# Patient Record
Sex: MALE | Race: WHITE | Employment: FULL TIME | URBAN - NONMETROPOLITAN AREA
[De-identification: names, ages, dates, MRNs, and addresses within clinical notes are randomized per-mention and may not be internally consistent; named-entity substitution may affect disease eponyms.]

---

## 2017-11-29 ENCOUNTER — HOSPITAL ENCOUNTER (EMERGENCY)
Age: 22
Discharge: HOME | End: 2017-11-29
Attending: EMERGENCY MEDICINE
Payer: COMMERCIAL

## 2017-11-29 ENCOUNTER — APPOINTMENT (OUTPATIENT)
Dept: GENERAL RADIOLOGY | Age: 22
End: 2017-11-29
Payer: COMMERCIAL

## 2017-11-29 ENCOUNTER — APPOINTMENT (OUTPATIENT)
Dept: CT IMAGING | Age: 22
End: 2017-11-29
Payer: COMMERCIAL

## 2017-11-29 VITALS
SYSTOLIC BLOOD PRESSURE: 139 MMHG | RESPIRATION RATE: 16 BRPM | BODY MASS INDEX: 26.51 KG/M2 | HEART RATE: 82 BPM | DIASTOLIC BLOOD PRESSURE: 70 MMHG | TEMPERATURE: 100.3 F | HEIGHT: 73 IN | WEIGHT: 200 LBS | OXYGEN SATURATION: 98 %

## 2017-11-29 DIAGNOSIS — K20.90 ACUTE ESOPHAGITIS: Primary | ICD-10-CM

## 2017-11-29 DIAGNOSIS — R74.8 ELEVATED LIPASE: ICD-10-CM

## 2017-11-29 LAB
ALBUMIN SERPL-MCNC: 3.6 GM/DL (ref 3.5–5)
ALP BLD-CCNC: 41 U/L (ref 46–116)
ALT SERPL-CCNC: 33 U/L (ref 12–78)
ANION GAP: 7 MEQ/L (ref 8–16)
AST SERPL-CCNC: 28 U/L (ref 15–37)
BASOPHILS # BLD: 0.9 % (ref 0–3)
BILIRUB SERPL-MCNC: 0.4 MG/DL (ref 0.2–1)
BUN BLDV-MCNC: 12 MG/DL (ref 7–18)
CHLORIDE BLD-SCNC: 100 MEQ/L (ref 98–107)
CO2: 28 MEQ/L (ref 21–32)
CREAT SERPL-MCNC: 1.1 MG/DL (ref 0.8–1.3)
EOSINOPHILS RELATIVE PERCENT: 3.7 % (ref 0–4)
GFR, ESTIMATED: 87 ML/MIN/1.73M2
GLUCOSE BLD-MCNC: 96 MG/DL (ref 74–106)
HCT VFR BLD CALC: 43.1 % (ref 42–52)
HEMOGLOBIN: 14.3 GM/DL (ref 14–18)
LIPASE: 485 U/L (ref 65–230)
LYMPHOCYTES # BLD: 20.3 % (ref 15–47)
MCH RBC QN AUTO: 29.4 PG (ref 27–31)
MCHC RBC AUTO-ENTMCNC: 33.2 GM/DL (ref 33–37)
MCV RBC AUTO: 88.7 FL (ref 80–94)
MONOCYTES: 12.6 % (ref 0–12)
PDW BLD-RTO: 11.9 % (ref 11.5–14.5)
PLATELET # BLD: 196 THOU/MM3 (ref 130–400)
PMV BLD AUTO: 7.3 MCM (ref 7.4–10.4)
POC CALCIUM: 8.9 MG/DL (ref 8.5–10.1)
POTASSIUM SERPL-SCNC: 4 MEQ/L (ref 3.5–5.1)
RBC # BLD: 4.86 MILL/MM3 (ref 4.7–6.1)
SEGS: 62.5 % (ref 43–75)
SODIUM BLD-SCNC: 135 MEQ/L (ref 136–145)
TOTAL PROTEIN: 7.6 GM/DL (ref 6.4–8.2)
WBC # BLD: 11.5 THOU/MM3 (ref 4.8–10.8)

## 2017-11-29 PROCEDURE — 80053 COMPREHEN METABOLIC PANEL: CPT

## 2017-11-29 PROCEDURE — 71020 XR CHEST STANDARD TWO VW: CPT

## 2017-11-29 PROCEDURE — 99284 EMERGENCY DEPT VISIT MOD MDM: CPT

## 2017-11-29 PROCEDURE — 6360000004 HC RX CONTRAST MEDICATION: Performed by: EMERGENCY MEDICINE

## 2017-11-29 PROCEDURE — 83690 ASSAY OF LIPASE: CPT

## 2017-11-29 PROCEDURE — C9113 INJ PANTOPRAZOLE SODIUM, VIA: HCPCS | Performed by: EMERGENCY MEDICINE

## 2017-11-29 PROCEDURE — 6360000002 HC RX W HCPCS: Performed by: EMERGENCY MEDICINE

## 2017-11-29 PROCEDURE — 74177 CT ABD & PELVIS W/CONTRAST: CPT

## 2017-11-29 PROCEDURE — 96374 THER/PROPH/DIAG INJ IV PUSH: CPT

## 2017-11-29 PROCEDURE — 2580000003 HC RX 258: Performed by: EMERGENCY MEDICINE

## 2017-11-29 PROCEDURE — 85025 COMPLETE CBC W/AUTO DIFF WBC: CPT

## 2017-11-29 PROCEDURE — 36415 COLL VENOUS BLD VENIPUNCTURE: CPT

## 2017-11-29 PROCEDURE — 96361 HYDRATE IV INFUSION ADD-ON: CPT

## 2017-11-29 PROCEDURE — 6370000000 HC RX 637 (ALT 250 FOR IP): Performed by: EMERGENCY MEDICINE

## 2017-11-29 RX ORDER — 0.9 % SODIUM CHLORIDE 0.9 %
500 INTRAVENOUS SOLUTION INTRAVENOUS ONCE
Status: COMPLETED | OUTPATIENT
Start: 2017-11-29 | End: 2017-11-29

## 2017-11-29 RX ORDER — OMEPRAZOLE 10 MG/1
10 CAPSULE, DELAYED RELEASE ORAL DAILY
COMMUNITY
End: 2017-11-29 | Stop reason: ALTCHOICE

## 2017-11-29 RX ORDER — ACETAMINOPHEN 325 MG/1
975 TABLET ORAL ONCE
Status: COMPLETED | OUTPATIENT
Start: 2017-11-29 | End: 2017-11-29

## 2017-11-29 RX ORDER — PANTOPRAZOLE SODIUM 40 MG/10ML
40 INJECTION, POWDER, LYOPHILIZED, FOR SOLUTION INTRAVENOUS DAILY
Status: DISCONTINUED | OUTPATIENT
Start: 2017-11-29 | End: 2017-11-29 | Stop reason: HOSPADM

## 2017-11-29 RX ORDER — SUCRALFATE ORAL 1 G/10ML
1 SUSPENSION ORAL 4 TIMES DAILY
Qty: 1200 ML | Refills: 3 | Status: SHIPPED | OUTPATIENT
Start: 2017-11-29 | End: 2018-03-25

## 2017-11-29 RX ORDER — PANTOPRAZOLE SODIUM 40 MG/1
40 TABLET, DELAYED RELEASE ORAL DAILY
Qty: 30 TABLET | Refills: 0 | Status: SHIPPED | OUTPATIENT
Start: 2017-11-29 | End: 2018-03-25

## 2017-11-29 RX ORDER — BACLOFEN 10 MG/1
10 TABLET ORAL 3 TIMES DAILY
COMMUNITY
End: 2018-03-25

## 2017-11-29 RX ADMIN — Medication 30 ML: at 16:34

## 2017-11-29 RX ADMIN — PANTOPRAZOLE SODIUM 40 MG: 40 INJECTION, POWDER, FOR SOLUTION INTRAVENOUS at 20:16

## 2017-11-29 RX ADMIN — ACETAMINOPHEN 975 MG: 325 TABLET ORAL at 20:15

## 2017-11-29 RX ADMIN — SODIUM CHLORIDE 500 ML: 9 INJECTION, SOLUTION INTRAVENOUS at 18:16

## 2017-11-29 RX ADMIN — IOHEXOL 50 ML: 240 INJECTION, SOLUTION INTRATHECAL; INTRAVASCULAR; INTRAVENOUS; ORAL at 19:21

## 2017-11-29 RX ADMIN — IOPAMIDOL 100 ML: 755 INJECTION, SOLUTION INTRAVENOUS at 19:21

## 2017-11-29 ASSESSMENT — ENCOUNTER SYMPTOMS
SHORTNESS OF BREATH: 0
SORE THROAT: 1
WHEEZING: 0
VOICE CHANGE: 0
VOMITING: 0
BLOOD IN STOOL: 0
CONSTIPATION: 0
ABDOMINAL DISTENTION: 0
TROUBLE SWALLOWING: 1
COUGH: 0
ANAL BLEEDING: 0
BACK PAIN: 0
NAUSEA: 0
ABDOMINAL PAIN: 1
DIARRHEA: 0

## 2017-11-29 ASSESSMENT — PAIN DESCRIPTION - PAIN TYPE
TYPE: ACUTE PAIN
TYPE: ACUTE PAIN

## 2017-11-29 ASSESSMENT — PAIN DESCRIPTION - DESCRIPTORS
DESCRIPTORS: ACHING
DESCRIPTORS: ACHING

## 2017-11-29 ASSESSMENT — PAIN DESCRIPTION - LOCATION: LOCATION: THROAT

## 2017-11-29 ASSESSMENT — PAIN SCALES - GENERAL
PAINLEVEL_OUTOF10: 3
PAINLEVEL_OUTOF10: 3

## 2017-11-29 ASSESSMENT — PAIN DESCRIPTION - FREQUENCY
FREQUENCY: CONTINUOUS
FREQUENCY: CONTINUOUS

## 2017-11-29 NOTE — ED PROVIDER NOTES
[facility]  eMERGENCY dEPARTMENT eNCOUnter             José Antonio Wheatley 17 COMPLAINT    Chief Complaint   Patient presents with    Dysphagia     from ckoking on Sunday       Nurses Notes reviewed and I agree except as noted in the HPI. HPI    José Espinoza is a 23 y.o. male who presents stating that 4 days ago, some \"dry turkey meat\" got stuck in his esophagus, and he had to \"force it down with water\". This took several minutes, and ever since then he has had a sore spot in the lower xyphoid area, just above the epigastrium. The pain is always there as an aching, 3-4/10, but if he eats or drinks anything acidic or solid, he has worse pain. Swallowing food is the main thing that makes it worse. He has tried Ibuprofen 800 mg, Baclofen, and took a dose of Omeprazole today. He is concerned that he might have a tear in his esophagus. He has had some problem with choking and \"things getting stuck\" in the past, but this is the worst episode. He has never been scoped or seen a doctor for this kind of problem. He feels like \"his sugar is low and he might be dehydrated\" due to this. He has not vomited, and has not had any dark stools. He is drinking plenty, but avoiding solids. He tried some Hydrocodone from \"some else\", without relief of pain. REVIEW OF SYSTEMS      Review of Systems   Constitutional: Positive for fatigue. HENT: Positive for sore throat (left more than right) and trouble swallowing. Negative for congestion and voice change. Respiratory: Negative for cough, shortness of breath and wheezing. Cardiovascular: Negative for palpitations. Gastrointestinal: Positive for abdominal pain (epigastric). Negative for abdominal distention, anal bleeding, blood in stool, constipation, diarrhea, nausea and vomiting. Musculoskeletal: Negative for back pain and neck pain. Neurological: Negative.     Psychiatric/Behavioral: The patient is nervous/anxious (about

## 2017-11-30 NOTE — ED NOTES
Pt stable and ready for dc. Pt is given discharge instructions and new prescriptions. Pt verbalizes understanding and ambulates independently.       Lisset Mark RN  11/29/17 1700

## 2017-11-30 NOTE — ED NOTES
Pt back from CT and using restroom.  States he feels dehydrated and like he has a fever     Machelle Jesus RN  11/29/17 1929

## 2017-11-30 NOTE — ED NOTES
Pt is in [facility], [address] [place] [address]  11/29/17 9402

## 2018-03-25 ENCOUNTER — HOSPITAL ENCOUNTER (EMERGENCY)
Age: 23
Discharge: HOME | End: 2018-03-25
Attending: FAMILY MEDICINE
Payer: COMMERCIAL

## 2018-03-25 VITALS
HEART RATE: 78 BPM | SYSTOLIC BLOOD PRESSURE: 143 MMHG | DIASTOLIC BLOOD PRESSURE: 94 MMHG | OXYGEN SATURATION: 98 % | TEMPERATURE: 96.1 F | RESPIRATION RATE: 14 BRPM

## 2018-03-25 DIAGNOSIS — J01.00 ACUTE MAXILLARY SINUSITIS, RECURRENCE NOT SPECIFIED: Primary | ICD-10-CM

## 2018-03-25 PROCEDURE — 99282 EMERGENCY DEPT VISIT SF MDM: CPT

## 2018-03-25 RX ORDER — FLUTICASONE PROPIONATE 50 MCG
2 SPRAY, SUSPENSION (ML) NASAL DAILY
Qty: 1 BOTTLE | Refills: 0 | Status: SHIPPED | OUTPATIENT
Start: 2018-03-25 | End: 2020-04-16

## 2018-03-25 RX ORDER — AMOXICILLIN AND CLAVULANATE POTASSIUM 875; 125 MG/1; MG/1
1 TABLET, FILM COATED ORAL 2 TIMES DAILY
Qty: 20 TABLET | Refills: 0 | Status: SHIPPED | OUTPATIENT
Start: 2018-03-25 | End: 2018-04-04

## 2018-03-25 ASSESSMENT — PAIN DESCRIPTION - LOCATION: LOCATION: THROAT

## 2018-03-25 ASSESSMENT — PAIN SCALES - GENERAL: PAINLEVEL_OUTOF10: 5

## 2018-03-25 ASSESSMENT — PAIN DESCRIPTION - PAIN TYPE: TYPE: ACUTE PAIN

## 2018-03-25 NOTE — ED PROVIDER NOTES
that includes Clavicle surgery; Tonsillectomy and Adenoidectomy; and Hand surgery (Right). CURRENT MEDICATIONS       Previous Medications    ALBUTEROL-IPRATROPIUM (COMBIVENT)  MCG/ACT INHALER    Inhale 2 puffs into the lungs every 6 hours as needed for Wheezing    AMPHETAMINE-DEXTROAMPHETAMINE (ADDERALL, 10MG,) 10 MG TABLET    Take 10 mg by mouth 2 times daily       ALLERGIES     is allergic to erythromycin. FAMILY HISTORY     has no family status information on file. family history is not on file. SOCIAL HISTORY      reports that he has never smoked. He has never used smokeless tobacco. He reports that he drinks alcohol. He reports that he does not use drugs. PHYSICAL EXAM     INITIAL VITALS:  temperature is 96.1 °F (35.6 °C). His blood pressure is 143/94 (abnormal) and his pulse is 78. His respiration is 14 and oxygen saturation is 98%. Physical Exam   Constitutional: He appears well-developed and well-nourished. No distress. HENT:   Right Ear: External ear normal.   Left Ear: External ear normal.   Nose: Nose normal.   Mouth/Throat: No oropharyngeal exudate. Patient has palpable maxillary tenderness. Eyes: Conjunctivae and EOM are normal. Pupils are equal, round, and reactive to light. Right eye exhibits no discharge. Left eye exhibits no discharge. Neck: Normal range of motion. Neck supple. Cardiovascular: Normal rate, regular rhythm, normal heart sounds and intact distal pulses. No murmur heard. Pulmonary/Chest: Effort normal and breath sounds normal.   Lymphadenopathy:     He has no cervical adenopathy. Skin: Skin is dry. No rash noted. Psychiatric: He has a normal mood and affect. Nursing note and vitals reviewed.       DIFFERENTIAL DIAGNOSIS:   Sinusitis, pharyngitis, URI, allergic rhinitis     DIAGNOSTIC RESULTS     EKG: All EKG's are interpreted by the Emergency Department Physician who either signs or Co-signs this chart in the absence of a cardiologist.      RADIOLOGY: non-plain film images(s) such as CT, Ultrasound and MRI are read by the radiologist.  Plain radiographic images are visualized and preliminarily interpreted by the emergency physician unless otherwise stated below. LABS:   Labs Reviewed - No data to display    EMERGENCY DEPARTMENT COURSE(MDM): Vitals:    Vitals:    03/25/18 1538   BP: (!) 143/94   Pulse: 78   Resp: 14   Temp: 96.1 °F (35.6 °C)   SpO2: 98%     Patient's exam findings were minimal.  He does have some mild tenderness to the maxillary sinuses on palpation. He does not note any upper dental tenderness. The rest of the HEENT exam unremarkable. Posterior pharynx appeared to be clear. There is no exudate, no anterior cervical adenopathy, no soft palate erythema, and no fever. The patient does have a recurrent history of allergic rhinitis. At this time I will recommend Flonase, we will start the patient on antibiotics secondary to duration of his symptoms and his past experiences with sinus infections. I recommended further follow-up with his PCP. Care instructions were discussed with the patient who voiced understanding    CRITICAL CARE:       CONSULTS:  None    PROCEDURES:  None    FINAL IMPRESSION      1. Acute maxillary sinusitis, recurrence not specified          DISPOSITION/PLAN   Home. Care instructions provided. Follow up with PCP if symptoms are not improving over the next week.     PATIENT REFERRED TO:  Andry Martinez [address]  [phone]    In 3 days  If symptoms worsen, As needed      DISCHARGE MEDICATIONS:  New Prescriptions    AMOXICILLIN-CLAVULANATE (AUGMENTIN) 875-125 MG PER TABLET    Take 1 tablet by mouth 2 times daily for 10 days    FLUTICASONE (FLONASE) 50 MCG/ACT NASAL SPRAY    2 sprays by Nasal route daily       (Please note that portions of this note were completed with a voice recognition program.  Efforts were made to edit the dictations but occasionally words

## 2018-03-26 ASSESSMENT — ENCOUNTER SYMPTOMS
BACK PAIN: 0
COUGH: 0
WHEEZING: 0
SINUS PRESSURE: 1
EYE DISCHARGE: 0
CHEST TIGHTNESS: 0
VOMITING: 0
NAUSEA: 0
CONSTIPATION: 0
ABDOMINAL DISTENTION: 0
EYE REDNESS: 0
SHORTNESS OF BREATH: 0
PHOTOPHOBIA: 0
STRIDOR: 0
SORE THROAT: 1
ABDOMINAL PAIN: 0
DIARRHEA: 0
EYE PAIN: 0
RHINORRHEA: 0

## 2018-05-28 ENCOUNTER — HOSPITAL ENCOUNTER (EMERGENCY)
Age: 23
Discharge: HOME | End: 2018-05-28
Attending: EMERGENCY MEDICINE
Payer: COMMERCIAL

## 2018-05-28 VITALS
DIASTOLIC BLOOD PRESSURE: 61 MMHG | RESPIRATION RATE: 16 BRPM | HEART RATE: 88 BPM | SYSTOLIC BLOOD PRESSURE: 140 MMHG | TEMPERATURE: 97.4 F | OXYGEN SATURATION: 96 %

## 2018-05-28 DIAGNOSIS — L92.3 TATTOO REACTION: Primary | ICD-10-CM

## 2018-05-28 PROCEDURE — 6360000002 HC RX W HCPCS: Performed by: EMERGENCY MEDICINE

## 2018-05-28 PROCEDURE — 96372 THER/PROPH/DIAG INJ SC/IM: CPT

## 2018-05-28 PROCEDURE — 99283 EMERGENCY DEPT VISIT LOW MDM: CPT

## 2018-05-28 RX ORDER — METHYLPREDNISOLONE ACETATE 80 MG/ML
80 INJECTION, SUSPENSION INTRA-ARTICULAR; INTRALESIONAL; INTRAMUSCULAR; SOFT TISSUE ONCE
Status: COMPLETED | OUTPATIENT
Start: 2018-05-28 | End: 2018-05-28

## 2018-05-28 RX ORDER — ONDANSETRON 4 MG/1
4 TABLET, ORALLY DISINTEGRATING ORAL EVERY 8 HOURS PRN
Qty: 6 TABLET | Refills: 0 | Status: SHIPPED | OUTPATIENT
Start: 2018-05-28 | End: 2020-04-16

## 2018-05-28 RX ADMIN — METHYLPREDNISOLONE ACETATE 80 MG: 80 INJECTION, SUSPENSION INTRA-ARTICULAR; INTRALESIONAL; INTRAMUSCULAR; SOFT TISSUE at 19:31

## 2018-05-29 ASSESSMENT — ENCOUNTER SYMPTOMS
VOMITING: 0
RESPIRATORY NEGATIVE: 1
ABDOMINAL PAIN: 0
DIARRHEA: 1

## 2020-04-16 ENCOUNTER — OFFICE VISIT (OUTPATIENT)
Dept: PRIMARY CARE CLINIC | Age: 25
End: 2020-04-16
Payer: COMMERCIAL

## 2020-04-16 VITALS
TEMPERATURE: 98.5 F | HEART RATE: 96 BPM | DIASTOLIC BLOOD PRESSURE: 73 MMHG | SYSTOLIC BLOOD PRESSURE: 147 MMHG | OXYGEN SATURATION: 100 % | RESPIRATION RATE: 20 BRPM

## 2020-04-16 LAB
INFLUENZA A ANTIBODY: NEGATIVE
INFLUENZA B ANTIBODY: NEGATIVE

## 2020-04-16 PROCEDURE — 99203 OFFICE O/P NEW LOW 30 MIN: CPT | Performed by: FAMILY MEDICINE

## 2020-04-16 PROCEDURE — 87804 INFLUENZA ASSAY W/OPTIC: CPT | Performed by: FAMILY MEDICINE

## 2020-04-16 RX ORDER — PREDNISONE 20 MG/1
20 TABLET ORAL 2 TIMES DAILY
Qty: 10 TABLET | Refills: 0 | Status: SHIPPED | OUTPATIENT
Start: 2020-04-16 | End: 2020-04-21

## 2020-04-16 RX ORDER — LORATADINE 10 MG/1
10 CAPSULE, LIQUID FILLED ORAL DAILY
COMMUNITY
End: 2021-07-28

## 2020-04-16 RX ORDER — ALBUTEROL SULFATE 90 UG/1
2 AEROSOL, METERED RESPIRATORY (INHALATION) EVERY 6 HOURS PRN
COMMUNITY
End: 2022-01-18 | Stop reason: SDUPTHER

## 2020-04-16 RX ORDER — BENZONATATE 100 MG/1
100 CAPSULE ORAL 3 TIMES DAILY PRN
Qty: 30 CAPSULE | Refills: 0 | Status: SHIPPED | OUTPATIENT
Start: 2020-04-16 | End: 2020-04-26

## 2020-04-16 RX ORDER — DEXTROAMPHETAMINE SACCHARATE, AMPHETAMINE ASPARTATE MONOHYDRATE, DEXTROAMPHETAMINE SULFATE AND AMPHETAMINE SULFATE 7.5; 7.5; 7.5; 7.5 MG/1; MG/1; MG/1; MG/1
30 CAPSULE, EXTENDED RELEASE ORAL EVERY MORNING
COMMUNITY
End: 2021-08-18 | Stop reason: SDUPTHER

## 2020-04-16 RX ORDER — DOXYCYCLINE HYCLATE 100 MG
100 TABLET ORAL 2 TIMES DAILY
Qty: 20 TABLET | Refills: 0 | Status: SHIPPED | OUTPATIENT
Start: 2020-04-16 | End: 2021-07-28

## 2020-04-16 ASSESSMENT — ENCOUNTER SYMPTOMS
NAUSEA: 0
COUGH: 1
SHORTNESS OF BREATH: 1
VOMITING: 0
WHEEZING: 1
RHINORRHEA: 1
SORE THROAT: 1

## 2020-04-16 NOTE — PATIENT INSTRUCTIONS
petting, snuggling, being kissed or licked, and sharing food. If you must care for your pet or be around animals while you are sick, wash your hands before and after you interact with pets and wear a facemask. Call ahead before visiting your doctor  If you have a medical appointment, call the healthcare provider and tell them that you have or may have COVID-19. This will help the healthcare providers office take steps to keep other people from getting infected or exposed. Wear a facemask  You should wear a facemask when you are around other people (e.g., sharing a room or vehicle) or pets and before you enter a healthcare providers office. If you are not able to wear a facemask (for example, because it causes trouble breathing), then people who live with you should not stay in the same room with you, or they should wear a facemask if they enter your room. Cover your coughs and sneezes  Cover your mouth and nose with a tissue when you cough or sneeze. Throw used tissues in a lined trash can. Immediately wash your hands with soap and water for at least 20 seconds or, if soap and water are not available, clean your hands with an alcohol-based hand  that contains at least 60% alcohol. Clean your hands often  Wash your hands often with soap and water for at least 20 seconds, especially after blowing your nose, coughing, or sneezing; going to the bathroom; and before eating or preparing food. If soap and water are not readily available, use an alcohol-based hand  with at least 60% alcohol, covering all surfaces of your hands and rubbing them together until they feel dry. Soap and water are the best option if hands are visibly dirty. Avoid touching your eyes, nose, and mouth with unwashed hands. Avoid sharing personal household items  You should not share dishes, drinking glasses, cups, eating utensils, towels, or bedding with other people or pets in your home.  After using these items, they should be washed thoroughly with soap and water. Clean all high-touch surfaces everyday  High touch surfaces include counters, tabletops, doorknobs, bathroom fixtures, toilets, phones, keyboards, tablets, and bedside tables. Also, clean any surfaces that may have blood, stool, or body fluids on them. Use a household cleaning spray or wipe, according to the label instructions. Labels contain instructions for safe and effective use of the cleaning product including precautions you should take when applying the product, such as wearing gloves and making sure you have good ventilation during use of the product. Monitor your symptoms  Seek prompt medical attention if your illness is worsening (e.g., difficulty breathing). Before seeking care, call your healthcare provider and tell them that you have, or are being evaluated for, COVID-19. Put on a facemask before you enter the facility. These steps will help the healthcare providers office to keep other people in the office or waiting room from getting infected or exposed. Ask your healthcare provider to call the local or [facility] health department. Persons who are placed under active monitoring or facilitated self-monitoring should follow instructions provided by their local health department or occupational health professionals, as appropriate. When working with your local health department check their available hours. If you have a medical emergency and need to call 911, notify the dispatch personnel that you have, or are being evaluated for COVID-19. If possible, put on a facemask before emergency medical services arrive. Discontinuing home isolation  Patients with confirmed COVID-19 should remain under home isolation precautions until the risk of secondary transmission to others is thought to be low.  The decision to discontinue home isolation precautions should be made on a case-by-case basis, in consultation with healthcare providers and state and [facility] health

## 2020-04-16 NOTE — PROGRESS NOTES
[address] [facility] [address]  [address] [facility]  Dept: [phone]  Dept Fax: [phone]  Loc: [phone]  PROGRESS NOTE      Visit Date: 4/16/2020    Celeste Lemon is a 30 y.o. male who presents today for:  Chief Complaint   Patient presents with    Cough     He has SOB, fatigue, body aches, headache, dry cough, sense of taste is off. He has asthma.  Shortness of Breath    Headache       Subjective:  HPI   New Patient  Cough:  started 3-4 days ago. Has occasional SOB and wheezing. Taste sensation is off. Symptoms have been stable. Using albuterol 2x per day. Has asthma. Eating less. Drinking fluids well. Taking excedrin for headaches. he is more tired. 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 Has people out at work due to illness. His girlfriend was quarantined due to possible COVID (not tested). No recent travel    Recent Travel Screening and Travel History documentation:     Travel Screening       Question Response     Do you have any of the following symptoms? Cough; Severe headache;Shortness of breath;Joint pain     In the last month, have you been in contact with someone who was confirmed or suspected to have Coronavirus / COVID-19? Yes (His girlfriend was susected positive/quarantined for 14 days. Works at [employer].)     Have you traveled internationally in the last month? No      Travel History   Travel since 03/16/20     No documented travel since 03/16/20             Review of Systems   Constitutional: Positive for chills, fatigue and fever. HENT: Positive for rhinorrhea and sore throat. Negative for ear pain. Respiratory: Positive for cough, shortness of breath and wheezing. Gastrointestinal: Negative for nausea and vomiting. Neurological: Positive for headaches. There is no problem list on file for this patient.     Past Medical History:   Diagnosis Date    ADHD (attention deficit hyperactivity disorder)     Asthma atraumatic. Right Ear: Tympanic membrane, ear canal and external ear normal.      Left Ear: Tympanic membrane, ear canal and external ear normal.      Mouth/Throat:      Mouth: Mucous membranes are moist.      Pharynx: Posterior oropharyngeal erythema present. No oropharyngeal exudate. Cardiovascular:      Rate and Rhythm: Normal rate and regular rhythm. Heart sounds: No murmur. Pulmonary:      Effort: Pulmonary effort is normal. No respiratory distress. Breath sounds: Normal breath sounds. No wheezing or rhonchi. Neurological:      Mental Status: He is alert and oriented to person, place, and time. Mental status is at baseline. Impression/Plan:  1. Acute bronchitis due to other specified organisms  New problem. Increased risk for complications due to asthma. Lower respiratory symptoms with loss of taste which makes COVID likely. Influenza testing is negative. Unlikely PNA. May have mild asthma exacerbation. Pulse ox is good with slightly increased RR. Appropriate for outpatient treatment. Continue albuterol. rx for prednisone and doxycycline (no azithromycin due to allergy to erythromycin). Enroll in LOOP. 14 day quarantine at home starting 4/13. Discussed s/s that warrant going to ER.  - doxycycline hyclate (VIBRA-TABS) 100 MG tablet; Take 1 tablet by mouth 2 times daily  Dispense: 20 tablet; Refill: 0  - benzonatate (TESSALON) 100 MG capsule; Take 1 capsule by mouth 3 times daily as needed for Cough  Dispense: 30 capsule; Refill: 0  - predniSONE (DELTASONE) 20 MG tablet; Take 1 tablet by mouth 2 times daily for 5 days  Dispense: 10 tablet; Refill: 0    2. Suspected 2019 novel coronavirus infection  As above    3. Mild intermittent asthma with acute exacerbation  - doxycycline hyclate (VIBRA-TABS) 100 MG tablet; Take 1 tablet by mouth 2 times daily  Dispense: 20 tablet; Refill: 0  - benzonatate (TESSALON) 100 MG capsule;  Take 1 capsule by mouth 3 times daily as needed for

## 2021-07-28 ENCOUNTER — OFFICE VISIT (OUTPATIENT)
Dept: FAMILY MEDICINE CLINIC | Age: 26
End: 2021-07-28
Payer: COMMERCIAL

## 2021-07-28 VITALS
HEART RATE: 80 BPM | BODY MASS INDEX: 35.78 KG/M2 | SYSTOLIC BLOOD PRESSURE: 130 MMHG | WEIGHT: 270 LBS | DIASTOLIC BLOOD PRESSURE: 84 MMHG | RESPIRATION RATE: 16 BRPM | TEMPERATURE: 98.2 F | HEIGHT: 73 IN | OXYGEN SATURATION: 98 %

## 2021-07-28 DIAGNOSIS — F90.9 ATTENTION DEFICIT HYPERACTIVITY DISORDER (ADHD), UNSPECIFIED ADHD TYPE: ICD-10-CM

## 2021-07-28 DIAGNOSIS — J45.20 MILD INTERMITTENT ASTHMA, UNSPECIFIED WHETHER COMPLICATED: ICD-10-CM

## 2021-07-28 PROCEDURE — 99203 OFFICE O/P NEW LOW 30 MIN: CPT | Performed by: FAMILY MEDICINE

## 2021-07-28 PROCEDURE — 1036F TOBACCO NON-USER: CPT | Performed by: FAMILY MEDICINE

## 2021-07-28 PROCEDURE — G8427 DOCREV CUR MEDS BY ELIG CLIN: HCPCS | Performed by: FAMILY MEDICINE

## 2021-07-28 PROCEDURE — G8417 CALC BMI ABV UP PARAM F/U: HCPCS | Performed by: FAMILY MEDICINE

## 2021-07-28 RX ORDER — 5HYDROXYTRYPTOPHAN(OXITRIPTAN) 200 MG
CAPSULE ORAL
COMMUNITY

## 2021-07-28 RX ORDER — CHLORAL HYDRATE 500 MG
3000 CAPSULE ORAL DAILY
COMMUNITY

## 2021-07-28 RX ORDER — CETIRIZINE HYDROCHLORIDE 10 MG/1
10 TABLET ORAL DAILY
COMMUNITY
End: 2022-05-11

## 2021-07-28 ASSESSMENT — PATIENT HEALTH QUESTIONNAIRE - PHQ9
SUM OF ALL RESPONSES TO PHQ QUESTIONS 1-9: 2
1. LITTLE INTEREST OR PLEASURE IN DOING THINGS: 1
SUM OF ALL RESPONSES TO PHQ9 QUESTIONS 1 & 2: 2
SUM OF ALL RESPONSES TO PHQ QUESTIONS 1-9: 2
SUM OF ALL RESPONSES TO PHQ QUESTIONS 1-9: 2
2. FEELING DOWN, DEPRESSED OR HOPELESS: 1

## 2021-07-28 ASSESSMENT — ENCOUNTER SYMPTOMS
SHORTNESS OF BREATH: 0
ABDOMINAL PAIN: 0
DIARRHEA: 0
WHEEZING: 0
NAUSEA: 0
CONSTIPATION: 0
RHINORRHEA: 0
COUGH: 0
SORE THROAT: 0

## 2021-07-28 NOTE — PROGRESS NOTES
[address] [place] [address] [address]. [address] [place] [address]  Dept: [phone]  Loc: [phone] (:  1995) is a 30 y.o. male, here for evaluation of the following chief complaint(s):  Established New Doctor      ASSESSMENT/PLAN:  1. Attention deficit hyperactivity disorder (ADHD), unspecified ADHD type  2. Mild intermittent asthma, unspecified whether complicated    Chronic conditions controlled. Follow up q 3 months or prn    Return in about 3 months (around 10/28/2021) for follow up. SUBJECTIVE/OBJECTIVE:  Presents to establish care. Has a history of ADHD as well as asthma. States that his ADHD has been very well controlled on his 30 mg of Adderall. He does work nights for [employer] which requires a lot of focus and concentration. He states that the medication helps him do this appropriately. He states appetite is good. Sleep is sometimes disturbed due to his work schedule but he states he occasionally takes over-the-counter regimens or melatonin to help with this. Patient also has a history of asthma but states that he rarely requires albuterol. If he happens to get bronchitis or a cold he may need the albuterol but otherwise does not take anything regularly. Denies any current issues with chest tightness or shortness of breath. In general he is feeling well and has no complaints today. Review of Systems   Constitutional: Negative for activity change, appetite change, chills, fatigue and fever. HENT: Negative for congestion, rhinorrhea and sore throat. Respiratory: Negative for cough, shortness of breath and wheezing. Cardiovascular: Negative for chest pain and palpitations. Gastrointestinal: Negative for abdominal pain, constipation, diarrhea and nausea. Genitourinary: Negative for dysuria and hematuria. Musculoskeletal: Negative for arthralgias and myalgias.    Neurological: Negative for dizziness and headaches. Psychiatric/Behavioral: Positive for decreased concentration (controlled) and sleep disturbance. The patient is not nervous/anxious. Physical Exam  Vitals and nursing note reviewed. Constitutional:       General: He is not in acute distress. Appearance: He is well-developed. HENT:      Head: Normocephalic and atraumatic. Right Ear: Hearing, tympanic membrane, ear canal and external ear normal.      Left Ear: Hearing, tympanic membrane, ear canal and external ear normal.      Nose:      Right Sinus: No maxillary sinus tenderness or frontal sinus tenderness. Left Sinus: No maxillary sinus tenderness or frontal sinus tenderness. Mouth/Throat:      Pharynx: No oropharyngeal exudate or posterior oropharyngeal erythema. Eyes:      General: No scleral icterus. Right eye: No discharge. Left eye: No discharge. Conjunctiva/sclera: Conjunctivae normal.      Pupils: Pupils are equal, round, and reactive to light. Cardiovascular:      Rate and Rhythm: Normal rate and regular rhythm. Heart sounds: Normal heart sounds. Pulmonary:      Effort: Pulmonary effort is normal. No respiratory distress. Breath sounds: Normal breath sounds. No wheezing. Abdominal:      General: Bowel sounds are normal. There is no distension. Palpations: Abdomen is soft. Tenderness: There is no abdominal tenderness. Musculoskeletal:         General: No tenderness. Normal range of motion. Cervical back: Normal range of motion and neck supple. Lymphadenopathy:      Cervical: No cervical adenopathy. Skin:     General: Skin is warm and dry. Findings: No rash. Neurological:      Mental Status: He is alert and oriented to person, place, and time. Motor: No abnormal muscle tone. Coordination: Coordination normal.   Psychiatric:         Behavior: Behavior normal.         Thought Content:  Thought content normal.         Judgment: Judgment normal.         Vitals:    07/28/21 0957   BP: 130/84   Pulse: 80   Resp: 16   Temp: 98.2 °F (36.8 °C)   SpO2: 98%              An electronic signature was used to authenticate this note.     --Ayanna Maier MD

## 2021-08-18 DIAGNOSIS — F90.9 ATTENTION DEFICIT HYPERACTIVITY DISORDER (ADHD), UNSPECIFIED ADHD TYPE: Primary | ICD-10-CM

## 2021-08-18 RX ORDER — DEXTROAMPHETAMINE SACCHARATE, AMPHETAMINE ASPARTATE MONOHYDRATE, DEXTROAMPHETAMINE SULFATE AND AMPHETAMINE SULFATE 7.5; 7.5; 7.5; 7.5 MG/1; MG/1; MG/1; MG/1
30 CAPSULE, EXTENDED RELEASE ORAL EVERY MORNING
Qty: 30 CAPSULE | Refills: 0 | Status: SHIPPED | OUTPATIENT
Start: 2021-08-18 | End: 2021-09-20 | Stop reason: SDUPTHER

## 2021-09-20 ENCOUNTER — TELEPHONE (OUTPATIENT)
Dept: FAMILY MEDICINE CLINIC | Age: 26
End: 2021-09-20

## 2021-09-20 DIAGNOSIS — F90.9 ATTENTION DEFICIT HYPERACTIVITY DISORDER (ADHD), UNSPECIFIED ADHD TYPE: ICD-10-CM

## 2021-09-20 RX ORDER — DEXTROAMPHETAMINE SACCHARATE, AMPHETAMINE ASPARTATE MONOHYDRATE, DEXTROAMPHETAMINE SULFATE AND AMPHETAMINE SULFATE 7.5; 7.5; 7.5; 7.5 MG/1; MG/1; MG/1; MG/1
30 CAPSULE, EXTENDED RELEASE ORAL EVERY MORNING
Qty: 30 CAPSULE | Refills: 0 | Status: SHIPPED | OUTPATIENT
Start: 2021-09-20 | End: 2021-10-18 | Stop reason: SDUPTHER

## 2021-09-20 NOTE — TELEPHONE ENCOUNTER
Patient's last appointment was : 7/28/2021  Patient's next appointment is : 10/25/2021  Last refilled:  8/18/21

## 2021-09-20 NOTE — TELEPHONE ENCOUNTER
----- Message from Carlitos Rubalcava sent at 9/20/2021  2:44 PM EDT -----  Subject: Refill Request    QUESTIONS  Name of Medication? amphetamine-dextroamphetamine (ADDERALL XR) 30 MG   extended release capsule  Patient-reported dosage and instructions? Take 1 capsule by mouth every   morning for 30 days. How many days do you have left? 0  Preferred Pharmacy? [address] [facility] phone number (if available)? [phone]  ---------------------------------------------------------------------------  --------------  CALL BACK INFO  What is the best way for the office to contact you? OK to leave message on   voicemail  Preferred Call Back Phone Number?  [phone]

## 2021-10-18 DIAGNOSIS — F90.9 ATTENTION DEFICIT HYPERACTIVITY DISORDER (ADHD), UNSPECIFIED ADHD TYPE: ICD-10-CM

## 2021-10-18 RX ORDER — DEXTROAMPHETAMINE SACCHARATE, AMPHETAMINE ASPARTATE MONOHYDRATE, DEXTROAMPHETAMINE SULFATE AND AMPHETAMINE SULFATE 7.5; 7.5; 7.5; 7.5 MG/1; MG/1; MG/1; MG/1
30 CAPSULE, EXTENDED RELEASE ORAL EVERY MORNING
Qty: 30 CAPSULE | Refills: 0 | Status: SHIPPED | OUTPATIENT
Start: 2021-10-18 | End: 2021-11-21

## 2021-10-25 PROBLEM — L92.3: Status: ACTIVE | Noted: 2021-10-25

## 2021-11-11 ENCOUNTER — OFFICE VISIT (OUTPATIENT)
Dept: FAMILY MEDICINE CLINIC | Age: 26
End: 2021-11-11
Payer: COMMERCIAL

## 2021-11-11 VITALS
OXYGEN SATURATION: 96 % | WEIGHT: 273 LBS | TEMPERATURE: 96.9 F | DIASTOLIC BLOOD PRESSURE: 88 MMHG | HEART RATE: 63 BPM | RESPIRATION RATE: 18 BRPM | BODY MASS INDEX: 36.02 KG/M2 | SYSTOLIC BLOOD PRESSURE: 128 MMHG

## 2021-11-11 DIAGNOSIS — E03.9 HYPOTHYROIDISM, UNSPECIFIED TYPE: ICD-10-CM

## 2021-11-11 DIAGNOSIS — F90.9 ATTENTION DEFICIT HYPERACTIVITY DISORDER (ADHD), UNSPECIFIED ADHD TYPE: Primary | ICD-10-CM

## 2021-11-11 DIAGNOSIS — Z11.59 ENCOUNTER FOR HEPATITIS C SCREENING TEST FOR LOW RISK PATIENT: ICD-10-CM

## 2021-11-11 DIAGNOSIS — Z11.4 SCREENING FOR HIV (HUMAN IMMUNODEFICIENCY VIRUS): ICD-10-CM

## 2021-11-11 DIAGNOSIS — J30.2 SEASONAL ALLERGIES: ICD-10-CM

## 2021-11-11 DIAGNOSIS — Z80.8 FAMILY HISTORY OF THYROID CANCER: ICD-10-CM

## 2021-11-11 LAB
ALBUMIN SERPL-MCNC: 4.5 G/DL (ref 3.5–5.1)
ALP BLD-CCNC: 47 U/L (ref 38–126)
ALT SERPL-CCNC: 50 U/L (ref 11–66)
ANION GAP SERPL CALCULATED.3IONS-SCNC: 12 MEQ/L (ref 8–16)
AST SERPL-CCNC: 47 U/L (ref 5–40)
BASOPHILS # BLD: 0.8 %
BASOPHILS ABSOLUTE: 0.1 THOU/MM3 (ref 0–0.1)
BILIRUB SERPL-MCNC: 0.6 MG/DL (ref 0.3–1.2)
BUN BLDV-MCNC: 22 MG/DL (ref 7–22)
CALCIUM SERPL-MCNC: 9.3 MG/DL (ref 8.5–10.5)
CHLORIDE BLD-SCNC: 105 MEQ/L (ref 98–111)
CO2: 22 MEQ/L (ref 23–33)
CREAT SERPL-MCNC: 1 MG/DL (ref 0.4–1.2)
EOSINOPHIL # BLD: 5.5 %
EOSINOPHILS ABSOLUTE: 0.4 THOU/MM3 (ref 0–0.4)
ERYTHROCYTE [DISTWIDTH] IN BLOOD BY AUTOMATED COUNT: 12.5 % (ref 11.5–14.5)
ERYTHROCYTE [DISTWIDTH] IN BLOOD BY AUTOMATED COUNT: 40.4 FL (ref 35–45)
GFR SERPL CREATININE-BSD FRML MDRD: 89 ML/MIN/1.73M2
GLUCOSE BLD-MCNC: 91 MG/DL (ref 70–108)
HCT VFR BLD CALC: 47 % (ref 42–52)
HEMOGLOBIN: 15.5 GM/DL (ref 14–18)
IMMATURE GRANS (ABS): 0.02 THOU/MM3 (ref 0–0.07)
IMMATURE GRANULOCYTES: 0.3 %
LYMPHOCYTES # BLD: 38.5 %
LYMPHOCYTES ABSOLUTE: 3 THOU/MM3 (ref 1–4.8)
MCH RBC QN AUTO: 29.2 PG (ref 26–33)
MCHC RBC AUTO-ENTMCNC: 33 GM/DL (ref 32.2–35.5)
MCV RBC AUTO: 88.5 FL (ref 80–94)
MONOCYTES # BLD: 7.6 %
MONOCYTES ABSOLUTE: 0.6 THOU/MM3 (ref 0.4–1.3)
NUCLEATED RED BLOOD CELLS: 0 /100 WBC
PLATELET # BLD: 299 THOU/MM3 (ref 130–400)
PMV BLD AUTO: 10.1 FL (ref 9.4–12.4)
POTASSIUM SERPL-SCNC: 4.4 MEQ/L (ref 3.5–5.2)
RBC # BLD: 5.31 MILL/MM3 (ref 4.7–6.1)
SEG NEUTROPHILS: 47.3 %
SEGMENTED NEUTROPHILS ABSOLUTE COUNT: 3.7 THOU/MM3 (ref 1.8–7.7)
SODIUM BLD-SCNC: 139 MEQ/L (ref 135–145)
T4 FREE: 1.06 NG/DL (ref 0.93–1.76)
TOTAL PROTEIN: 7.3 G/DL (ref 6.1–8)
TSH SERPL DL<=0.05 MIU/L-ACNC: 7.07 UIU/ML (ref 0.4–4.2)
WBC # BLD: 7.9 THOU/MM3 (ref 4.8–10.8)

## 2021-11-11 PROCEDURE — 36415 COLL VENOUS BLD VENIPUNCTURE: CPT | Performed by: FAMILY MEDICINE

## 2021-11-11 PROCEDURE — 1036F TOBACCO NON-USER: CPT | Performed by: FAMILY MEDICINE

## 2021-11-11 PROCEDURE — G8417 CALC BMI ABV UP PARAM F/U: HCPCS | Performed by: FAMILY MEDICINE

## 2021-11-11 PROCEDURE — 96372 THER/PROPH/DIAG INJ SC/IM: CPT | Performed by: FAMILY MEDICINE

## 2021-11-11 PROCEDURE — G8484 FLU IMMUNIZE NO ADMIN: HCPCS | Performed by: FAMILY MEDICINE

## 2021-11-11 PROCEDURE — 99214 OFFICE O/P EST MOD 30 MIN: CPT | Performed by: FAMILY MEDICINE

## 2021-11-11 PROCEDURE — G8427 DOCREV CUR MEDS BY ELIG CLIN: HCPCS | Performed by: FAMILY MEDICINE

## 2021-11-11 RX ORDER — METHYLPREDNISOLONE ACETATE 80 MG/ML
120 INJECTION, SUSPENSION INTRA-ARTICULAR; INTRALESIONAL; INTRAMUSCULAR; SOFT TISSUE ONCE
Status: COMPLETED | OUTPATIENT
Start: 2021-11-11 | End: 2021-11-11

## 2021-11-11 RX ADMIN — METHYLPREDNISOLONE ACETATE 120 MG: 80 INJECTION, SUSPENSION INTRA-ARTICULAR; INTRALESIONAL; INTRAMUSCULAR; SOFT TISSUE at 15:51

## 2021-11-11 SDOH — ECONOMIC STABILITY: FOOD INSECURITY: WITHIN THE PAST 12 MONTHS, YOU WORRIED THAT YOUR FOOD WOULD RUN OUT BEFORE YOU GOT MONEY TO BUY MORE.: NEVER TRUE

## 2021-11-11 SDOH — ECONOMIC STABILITY: FOOD INSECURITY: WITHIN THE PAST 12 MONTHS, THE FOOD YOU BOUGHT JUST DIDN'T LAST AND YOU DIDN'T HAVE MONEY TO GET MORE.: NEVER TRUE

## 2021-11-11 ASSESSMENT — ENCOUNTER SYMPTOMS
ABDOMINAL PAIN: 0
RHINORRHEA: 0
COUGH: 0
DIARRHEA: 0
SORE THROAT: 0
CONSTIPATION: 0
WHEEZING: 0
NAUSEA: 0
SHORTNESS OF BREATH: 0

## 2021-11-11 ASSESSMENT — SOCIAL DETERMINANTS OF HEALTH (SDOH): HOW HARD IS IT FOR YOU TO PAY FOR THE VERY BASICS LIKE FOOD, HOUSING, MEDICAL CARE, AND HEATING?: NOT VERY HARD

## 2021-11-11 NOTE — PROGRESS NOTES
[address] [place] [address]  Dept: [phone]  Loc: [address] (:  1995) is a 27 y.o. male, here for evaluation of the following chief complaint(s):  Check-Up (would like to discuss thyroid- family hx of cancer) and Other (would like to discuss getting ordes for lab work)      ASSESSMENT/PLAN:  1. Attention deficit hyperactivity disorder (ADHD), unspecified ADHD type  -     CBC Auto Differential; Future  -     Comprehensive Metabolic Panel; Future  2. Hypothyroidism, unspecified type  -     TSH without Reflex; Future  -     T4, Free; Future  3. Screening for HIV (human immunodeficiency virus)  -     HIV Screen; Future  4. Encounter for hepatitis C screening test for low risk patient  -     Hepatitis C Antibody; Future  5. Family history of thyroid cancer  -     CBC Auto Differential; Future  -     Comprehensive Metabolic Panel; Future  -     TSH without Reflex; Future  -     T4, Free; Future  6. Seasonal allergies  -     methylPREDNISolone acetate (DEPO-MEDROL) injection 120 mg; 120 mg, IntraMUSCular, ONCE, On Thu 21 at 1530, For 1 dose  labs to check thyroid/ screening  adhd controlled  deop medrol for allergies  Follow up in 3 months or prn    No follow-ups on file. SUBJECTIVE/OBJECTIVE:  Patient presents to discuss thyroid labs. Mentions that he has a strong family history with 5 aunts and uncles having thyroid cancer. He states that he had thyroid labs done through his testosterone clinic, and his TSH was elevated. He states that he would like to have it rechecked. He does admit to taking an over-the-counter thyroid supplement and stopped taking it when he saw his labs were off. He denies fatigue or dry skin. Denies constipation. He does complain of seasonal allergies with congestion. He request a steroid shot for this. Denies any fever or chills.   ADHD is well controlled he takes his meds as prescribed. Review of Systems   Constitutional: Negative for chills, fatigue and fever. HENT: Positive for congestion. Negative for rhinorrhea and sore throat. Respiratory: Negative for cough, shortness of breath and wheezing. Cardiovascular: Negative for chest pain and palpitations. Gastrointestinal: Negative for abdominal pain, constipation, diarrhea and nausea. Endocrine: Negative for cold intolerance and heat intolerance. Genitourinary: Negative for dysuria and hematuria. Musculoskeletal: Negative for arthralgias and myalgias. Allergic/Immunologic: Positive for environmental allergies. Neurological: Negative for dizziness and headaches. Psychiatric/Behavioral: Negative for sleep disturbance. The patient is not nervous/anxious. Physical Exam  Vitals and nursing note reviewed. Constitutional:       Appearance: He is well-developed. HENT:      Head: Normocephalic and atraumatic. Eyes:      General: No scleral icterus. Right eye: No discharge. Left eye: No discharge. Conjunctiva/sclera: Conjunctivae normal.   Cardiovascular:      Rate and Rhythm: Normal rate and regular rhythm. Heart sounds: Normal heart sounds. Pulmonary:      Effort: Pulmonary effort is normal.      Breath sounds: Normal breath sounds. No wheezing. Skin:     General: Skin is warm and dry. Neurological:      Mental Status: He is alert and oriented to person, place, and time. Mental status is at baseline. Psychiatric:         Behavior: Behavior normal.         Thought Content: Thought content normal.         Judgment: Judgment normal.         Vitals:    11/11/21 1455   BP: 128/88   Pulse: 63   Resp: 18   Temp: 96.9 °F (36.1 °C)   SpO2: 96%              An electronic signature was used to authenticate this note.     --Carla Villagomez MD

## 2021-11-11 NOTE — PROGRESS NOTES
Administrations This Visit     methylPREDNISolone acetate (DEPO-MEDROL) injection 120 mg     Admin Date  11/11/2021  15:51 Action  Given Dose  120 mg Route  IntraMUSCular Site  Dorsogluteal Right Administered By  Judy Rossi LPN    Ordering Provider: Molly Lagos MD    NDC: 1559-4189-26    Lot#: [id number]    : [employer]    Patient Supplied?: No                Patient instructed to report any adverse reaction to me immediately. Venipuncture obtained for left arm. Patient tolerated well with no concerns at this time.

## 2021-11-12 LAB — HEPATITIS C ANTIBODY: NEGATIVE

## 2021-11-13 LAB — HIV AG/AB: NONREACTIVE

## 2021-11-14 DIAGNOSIS — E03.9 HYPOTHYROIDISM, UNSPECIFIED TYPE: Primary | ICD-10-CM

## 2021-11-20 DIAGNOSIS — F90.9 ATTENTION DEFICIT HYPERACTIVITY DISORDER (ADHD), UNSPECIFIED ADHD TYPE: ICD-10-CM

## 2021-11-21 RX ORDER — DEXTROAMPHETAMINE SACCHARATE, AMPHETAMINE ASPARTATE MONOHYDRATE, DEXTROAMPHETAMINE SULFATE AND AMPHETAMINE SULFATE 7.5; 7.5; 7.5; 7.5 MG/1; MG/1; MG/1; MG/1
CAPSULE, EXTENDED RELEASE ORAL
Qty: 30 CAPSULE | Refills: 0 | Status: SHIPPED | OUTPATIENT
Start: 2021-11-21 | End: 2021-12-20 | Stop reason: SDUPTHER

## 2021-11-22 DIAGNOSIS — F90.9 ATTENTION DEFICIT HYPERACTIVITY DISORDER (ADHD), UNSPECIFIED ADHD TYPE: ICD-10-CM

## 2021-11-22 RX ORDER — DEXTROAMPHETAMINE SACCHARATE, AMPHETAMINE ASPARTATE MONOHYDRATE, DEXTROAMPHETAMINE SULFATE AND AMPHETAMINE SULFATE 7.5; 7.5; 7.5; 7.5 MG/1; MG/1; MG/1; MG/1
CAPSULE, EXTENDED RELEASE ORAL
Qty: 30 CAPSULE | Refills: 0 | OUTPATIENT
Start: 2021-11-22

## 2021-11-30 ENCOUNTER — PATIENT MESSAGE (OUTPATIENT)
Dept: FAMILY MEDICINE CLINIC | Age: 26
End: 2021-11-30

## 2021-11-30 DIAGNOSIS — E03.9 HYPOTHYROIDISM, UNSPECIFIED TYPE: Primary | ICD-10-CM

## 2021-11-30 DIAGNOSIS — F90.9 ATTENTION DEFICIT HYPERACTIVITY DISORDER (ADHD), UNSPECIFIED ADHD TYPE: ICD-10-CM

## 2021-12-01 NOTE — TELEPHONE ENCOUNTER
From: Wagner Lambert  To: Dr. Gaby Hayward: 2021 6:46 PM EST  Subject: Question regarding T4 FREE    Can we please test my T3 levels also to make sure the T4 is converting over sufficiently? Thank you.

## 2021-12-20 RX ORDER — DEXTROAMPHETAMINE SACCHARATE, AMPHETAMINE ASPARTATE MONOHYDRATE, DEXTROAMPHETAMINE SULFATE AND AMPHETAMINE SULFATE 7.5; 7.5; 7.5; 7.5 MG/1; MG/1; MG/1; MG/1
CAPSULE, EXTENDED RELEASE ORAL
Qty: 30 CAPSULE | Refills: 0 | Status: SHIPPED | OUTPATIENT
Start: 2021-12-20 | End: 2022-01-24

## 2022-01-15 ENCOUNTER — NURSE TRIAGE (OUTPATIENT)
Dept: OTHER | Facility: CLINIC | Age: 27
End: 2022-01-15

## 2022-01-15 NOTE — TELEPHONE ENCOUNTER
Received call from [employer], AN AFFILIATE OF [facility] SYSTEM at [facility] with [employer] Complaint. Subjective: Caller states \"I have a headache that seems to not want to go away, my eyes started to water yestearday evening. My sinuses would not stop draining. I started losing my voice. My sinuses drained all night while I slept. I woke up this morning with a  Headache. My joints feel stiff. \"     Current Symptoms: Cough, sinus congestion, drainage into throat, headache, body aches, No current SOB or chest pain, but does feel he is breathing more shallow, fatigue, weakness    Hx of Asthma-SOB yesterday with exertion/exercise or talking d/t nasal congestion, but has NOT experienced it as much today d/t resting- Has NOT needed inhaler     Covid exposure last Sunday-also in contact with many people at work on a daily basis- Covid prevalent at workplace    Onset: 2 days ago; waxing and waning    Associated Symptoms: increased sleepiness    Pain Severity: 3/10; aching headache/joints/lymph nodes; intermittent with activity    Temperature: Has not checked-no chills     What has been tried: Hot coffee, vitamins, allergy medication, daily supplement regimen    LMP: NA Pregnant: NA    Recommended disposition: See HCP within 4 Hours. Writer advised patient to be seen at [address], [facility] or ED d/t mild breathing difficulty/ hx of Asthma and need for Covid testing. PCP office closed. Patient agreeable. Care advice provided, patient verbalizes understanding; denies any other questions or concerns; instructed to call back for any new or worsening symptoms. Patient proceeding to nearest [address], [facility] or ED. Attention Provider: Thank you for allowing me to participate in the care of your patient. The patient was connected to triage in response to information provided to the [facility]/PSC. Please do not respond through this encounter as the response is not directed to a shared pool.       Reason for Disposition   MILD difficulty breathing (e.g., minimal/no SOB at rest, SOB with walking, pulse <100)    Protocols used: CORONAVIRUS (COVID-19) DIAGNOSED OR SUSPECTED-ADULT-AH

## 2022-01-18 ENCOUNTER — TELEPHONE (OUTPATIENT)
Dept: FAMILY MEDICINE CLINIC | Age: 27
End: 2022-01-18

## 2022-01-18 RX ORDER — ALBUTEROL SULFATE 90 UG/1
2 AEROSOL, METERED RESPIRATORY (INHALATION) EVERY 6 HOURS PRN
Qty: 18 G | Refills: 2 | Status: SHIPPED | OUTPATIENT
Start: 2022-01-18

## 2022-01-18 NOTE — TELEPHONE ENCOUNTER
----- Message from Nichol Gandaraedler sent at 1/18/2022  8:52 AM EST -----  Subject: Message to Provider    QUESTIONS  Information for Provider? Pt would like to know if he could get the covid   antibody treatments. Tested positive for covid on 01/17/22. Please call   pt.  ---------------------------------------------------------------------------  --------------  CALL BACK INFO  What is the best way for the office to contact you? OK to leave message on   voicemail  Preferred Call Back Phone Number? [phone]  ---------------------------------------------------------------------------  --------------  SCRIPT ANSWERS  Relationship to Patient?  Self

## 2022-01-18 NOTE — TELEPHONE ENCOUNTER
----- Message from Payton Horta sent at 1/18/2022  2:50 PM EST -----  Subject: Refill Request    QUESTIONS  Name of Medication? albuterol sulfate HFA (VENTOLIN HFA) 108 (90 Base)   MCG/ACT inhaler  Patient-reported dosage and instructions? Inhale 2 puffs into the lungs   every 6 hours as needed for Wheezing  How many days do you have left? 0  Preferred Pharmacy? [address] [facility] phone number (if available)? [phone]  Additional Information for Provider? Pt recently tested positive for   Covid, pt states his breathing is messed up.  ---------------------------------------------------------------------------  --------------  CALL BACK INFO  What is the best way for the office to contact you? OK to leave message on   voicemail  Preferred Call Back Phone Number?  [phone]

## 2022-01-21 ENCOUNTER — PATIENT MESSAGE (OUTPATIENT)
Dept: FAMILY MEDICINE CLINIC | Age: 27
End: 2022-01-21

## 2022-01-21 DIAGNOSIS — F90.9 ATTENTION DEFICIT HYPERACTIVITY DISORDER (ADHD), UNSPECIFIED ADHD TYPE: ICD-10-CM

## 2022-01-24 RX ORDER — DEXTROAMPHETAMINE SACCHARATE, AMPHETAMINE ASPARTATE MONOHYDRATE, DEXTROAMPHETAMINE SULFATE AND AMPHETAMINE SULFATE 7.5; 7.5; 7.5; 7.5 MG/1; MG/1; MG/1; MG/1
CAPSULE, EXTENDED RELEASE ORAL
Qty: 30 CAPSULE | Refills: 0 | OUTPATIENT
Start: 2022-01-24 | End: 2022-02-24

## 2022-01-24 NOTE — TELEPHONE ENCOUNTER
From: Adalberto Eugene  To: Dr. Reymundo Malone: 1/21/2022 10:42 AM EST  Subject: Prescription Renewal    Good Morning. May I please have my Adderall 30mg xr renewed and sent to the [facility]. Thank you.

## 2022-01-27 ENCOUNTER — NURSE ONLY (OUTPATIENT)
Dept: FAMILY MEDICINE CLINIC | Age: 27
End: 2022-01-27
Payer: COMMERCIAL

## 2022-01-27 DIAGNOSIS — E03.9 HYPOTHYROIDISM, UNSPECIFIED TYPE: ICD-10-CM

## 2022-01-27 LAB
T3 TOTAL: 97 NG/DL (ref 72–181)
T4 FREE: 1.25 NG/DL (ref 0.93–1.76)
TSH SERPL DL<=0.05 MIU/L-ACNC: 3 UIU/ML (ref 0.4–4.2)

## 2022-01-27 PROCEDURE — 99999 PR OFFICE/OUTPT VISIT,PROCEDURE ONLY: CPT | Performed by: FAMILY MEDICINE

## 2022-01-27 PROCEDURE — 36415 COLL VENOUS BLD VENIPUNCTURE: CPT | Performed by: FAMILY MEDICINE

## 2022-01-31 ENCOUNTER — TELEPHONE (OUTPATIENT)
Dept: FAMILY MEDICINE CLINIC | Age: 27
End: 2022-01-31

## 2022-01-31 NOTE — TELEPHONE ENCOUNTER
Patient contacted about labs. Patient wanting to know about his T3 being on the lower side, effecting his conversion which effects his metabolism. Visited. Mom says baby is breast feeding well. Mom denies soreness or concerns. Lanolin cream given with instructions for PRN use. I offer to assist and she is encouraged to call PRN.   Ramirez Barreto

## 2022-01-31 NOTE — TELEPHONE ENCOUNTER
[facility] Clinical Staff  Subject: Message to Provider     QUESTIONS   Information for Provider? fax release form to HR Fax #- [phone]   ---------------------------------------------------------------------------   --------------   Concepcion Stovall INFO   What is the best way for the office to contact you? OK to leave message on   voicemail   Preferred Call Back Phone Number? [phone]   ---------------------------------------------------------------------------   --------------   SCRIPT ANSWERS   Relationship to Patient?  Self

## 2022-01-31 NOTE — TELEPHONE ENCOUNTER
----- Message from [facility] sent at 1/28/2022  6:04 PM EST -----  Subject: Message to Provider    QUESTIONS  Information for Provider? Patient req a release to work note for 1/29/2022   from PCP due to covid for. Patient would like to  a physical copy   as well as receive through [address] Melinda Lambert ---------------------------------------------------------------------------  --------------  Latia Esquivel INFO  What is the best way for the office to contact you? OK to leave message on   voicemail  Preferred Call Back Phone Number? [phone]  ---------------------------------------------------------------------------  --------------  SCRIPT ANSWERS  Relationship to Patient?  Self

## 2022-01-31 NOTE — TELEPHONE ENCOUNTER
----- Message from Anna Johns MD sent at 1/30/2022  6:53 PM EST -----  Please advise patient that results are normal. Thanks.

## 2022-01-31 NOTE — LETTER
[address] [facility]  [address] [place] [address]  Phone: [phone]  Fax: [phone]    Anna Johns MD        January 31, 2022    Patient: Desiree Pederson   YOB: 1995   Date of Visit: 1/31/2022       To Whom It May Concern: It is my medical opinion that Edwina Headley has been cleared to return to work on 1/29/2022 due to [employer] requirements. No restrictions have been placed for patient. If you have any questions or concerns, please don't hesitate to call.     Sincerely,            Anna Johns MD

## 2022-02-01 NOTE — TELEPHONE ENCOUNTER
Received a message from the [address] [place] [address],[address] patient called back after hours. Tried to call patient this morning. Left a VM that I would sent him a message in [employer]. If he is unable to get in his [employer] to call the office back.

## 2022-02-14 ENCOUNTER — OFFICE VISIT (OUTPATIENT)
Dept: FAMILY MEDICINE CLINIC | Age: 27
End: 2022-02-14
Payer: COMMERCIAL

## 2022-02-14 VITALS
SYSTOLIC BLOOD PRESSURE: 146 MMHG | DIASTOLIC BLOOD PRESSURE: 86 MMHG | RESPIRATION RATE: 18 BRPM | BODY MASS INDEX: 36.03 KG/M2 | HEART RATE: 83 BPM | TEMPERATURE: 98.3 F | OXYGEN SATURATION: 97 % | HEIGHT: 73 IN

## 2022-02-14 DIAGNOSIS — Z23 NEED FOR VACCINATION: ICD-10-CM

## 2022-02-14 DIAGNOSIS — E03.9 HYPOTHYROIDISM, UNSPECIFIED TYPE: ICD-10-CM

## 2022-02-14 DIAGNOSIS — J30.2 SEASONAL ALLERGIES: ICD-10-CM

## 2022-02-14 DIAGNOSIS — G93.32 POST-COVID CHRONIC FATIGUE: Primary | ICD-10-CM

## 2022-02-14 DIAGNOSIS — U09.9 POST-COVID CHRONIC FATIGUE: Primary | ICD-10-CM

## 2022-02-14 DIAGNOSIS — R06.81 APNEA, TRANSIENT: ICD-10-CM

## 2022-02-14 DIAGNOSIS — F90.9 ATTENTION DEFICIT HYPERACTIVITY DISORDER (ADHD), UNSPECIFIED ADHD TYPE: ICD-10-CM

## 2022-02-14 PROCEDURE — 99214 OFFICE O/P EST MOD 30 MIN: CPT | Performed by: FAMILY MEDICINE

## 2022-02-14 PROCEDURE — G8427 DOCREV CUR MEDS BY ELIG CLIN: HCPCS | Performed by: FAMILY MEDICINE

## 2022-02-14 PROCEDURE — G8417 CALC BMI ABV UP PARAM F/U: HCPCS | Performed by: FAMILY MEDICINE

## 2022-02-14 PROCEDURE — 90715 TDAP VACCINE 7 YRS/> IM: CPT | Performed by: FAMILY MEDICINE

## 2022-02-14 PROCEDURE — 1036F TOBACCO NON-USER: CPT | Performed by: FAMILY MEDICINE

## 2022-02-14 PROCEDURE — 90471 IMMUNIZATION ADMIN: CPT | Performed by: FAMILY MEDICINE

## 2022-02-14 PROCEDURE — G8484 FLU IMMUNIZE NO ADMIN: HCPCS | Performed by: FAMILY MEDICINE

## 2022-02-14 ASSESSMENT — ENCOUNTER SYMPTOMS
SHORTNESS OF BREATH: 0
RHINORRHEA: 0
ABDOMINAL PAIN: 0
CONSTIPATION: 0
COUGH: 1
DIARRHEA: 0
WHEEZING: 1
NAUSEA: 0
SORE THROAT: 0

## 2022-02-14 NOTE — PROGRESS NOTES
After obtaining consent, and per orders of Dr. Marci Long, injection of Boostrix given in Left deltoid by Evelyn Carrizales CMA. Patient instructed to remain in clinic for 20 minutes afterwards, and to report any adverse reaction to me immediately.       Immunizations Administered     Name Date Dose Route    Tdap (Boostrix, Adacel) 2/14/2022 0.5 mL Intramuscular    Site: Deltoid- Left    Lot: Vickiejhoana Wilman    NDC: 43352-635-84

## 2022-02-14 NOTE — PROGRESS NOTES
[address] [place]  [address] [place] [address]  Dept: [phone]  Loc: [address] (:  1995) is a 27 y.o. male, here for evaluation of the following chief complaint(s):  3 Month Follow-Up      ASSESSMENT/PLAN:  1. Post-COVID chronic fatigue  2. Apnea, transient  -     [address] [place] [address]  3. Need for vaccination  -     Tdap (age 6y and older) IM ([email or web address])  4. Hypothyroidism, unspecified type  5. Attention deficit hyperactivity disorder (ADHD), unspecified ADHD type  6. Seasonal allergies    Increased exercise, refer to sleep center to eval apnea. Tdap given. Thyroid labs within normal limits, continue dose. Continue Adderall for ADHD and seasonal allergies controlled with OTC meds. No follow-ups on file. SUBJECTIVE/OBJECTIVE:  Patient presents for follow-up of Covid. Was diagnosed with Covid last month and did have to miss work for it. States he had several weeks of feeling fatigued and cough. Also mentions that when he had Covid, he felt as if he had apneic spells when exerting himself and while at night. Does mention that his sleep is never been very restful and he has gained weight since Covid, so he wonders if he has apnea. Does think that he snores. He has a history of asthma and allergies and states that these have been flaring lately since Covid but is taking over-the-counter meds and his albuterol inhaler which seems to help. States he does not smoke. Recently found out that his fiancée is pregnant and he is expecting a son in May, he does agree to the Tdap vaccine. Otherwise doing well without complaints. Review of Systems   Constitutional: Positive for activity change, appetite change, fatigue and unexpected weight change. Negative for chills and fever. HENT: Positive for congestion. Negative for rhinorrhea and sore throat.     Respiratory: Positive for cough and wheezing. Negative for shortness of breath. Cardiovascular: Negative for chest pain and palpitations. Gastrointestinal: Negative for abdominal pain, constipation, diarrhea and nausea. Genitourinary: Negative for dysuria and hematuria. Musculoskeletal: Negative for arthralgias and myalgias. Allergic/Immunologic: Positive for environmental allergies. Neurological: Negative for dizziness and headaches. Psychiatric/Behavioral: Positive for sleep disturbance. The patient is not nervous/anxious. Physical Exam  Vitals and nursing note reviewed. Constitutional:       General: He is not in acute distress. Appearance: He is well-developed. HENT:      Head: Normocephalic and atraumatic. Right Ear: Hearing, tympanic membrane, ear canal and external ear normal.      Left Ear: Hearing, tympanic membrane, ear canal and external ear normal.      Nose:      Right Sinus: No maxillary sinus tenderness or frontal sinus tenderness. Left Sinus: No maxillary sinus tenderness or frontal sinus tenderness. Mouth/Throat:      Pharynx: No oropharyngeal exudate or posterior oropharyngeal erythema. Eyes:      General: No scleral icterus. Right eye: No discharge. Left eye: No discharge. Conjunctiva/sclera: Conjunctivae normal.      Pupils: Pupils are equal, round, and reactive to light. Cardiovascular:      Rate and Rhythm: Normal rate and regular rhythm. Heart sounds: Normal heart sounds. Pulmonary:      Effort: Pulmonary effort is normal. No respiratory distress. Breath sounds: Normal breath sounds. No wheezing. Abdominal:      General: Bowel sounds are normal. There is no distension. Palpations: Abdomen is soft. Tenderness: There is no abdominal tenderness. Musculoskeletal:         General: No tenderness. Normal range of motion. Cervical back: Normal range of motion and neck supple. Lymphadenopathy:      Cervical: No cervical adenopathy. Skin:     General: Skin is warm and dry. Findings: No rash. Neurological:      Mental Status: He is alert and oriented to person, place, and time. Motor: No abnormal muscle tone. Coordination: Coordination normal.   Psychiatric:         Behavior: Behavior normal.         Thought Content: Thought content normal.         Judgment: Judgment normal.         Vitals:    02/14/22 0751   BP: (!) 146/86   Pulse: 83   Resp: 18   Temp: 98.3 °F (36.8 °C)   SpO2: 97%              An electronic signature was used to authenticate this note.     --Novlia Torrez MD

## 2022-02-21 ENCOUNTER — PATIENT MESSAGE (OUTPATIENT)
Dept: FAMILY MEDICINE CLINIC | Age: 27
End: 2022-02-21

## 2022-02-21 DIAGNOSIS — F90.9 ATTENTION DEFICIT HYPERACTIVITY DISORDER (ADHD), UNSPECIFIED ADHD TYPE: ICD-10-CM

## 2022-02-21 RX ORDER — DEXTROAMPHETAMINE SACCHARATE, AMPHETAMINE ASPARTATE MONOHYDRATE, DEXTROAMPHETAMINE SULFATE AND AMPHETAMINE SULFATE 7.5; 7.5; 7.5; 7.5 MG/1; MG/1; MG/1; MG/1
CAPSULE, EXTENDED RELEASE ORAL
Qty: 30 CAPSULE | Refills: 0 | Status: SHIPPED | OUTPATIENT
Start: 2022-02-21 | End: 2022-03-24 | Stop reason: SDUPTHER

## 2022-02-21 NOTE — TELEPHONE ENCOUNTER
From: Claudia Ross  To: Dr. Askew Sebastian: 2/21/2022 3:53 PM EST  Subject: Adderall 30 mg     Good evening Dr. Antonia Trinidad. I'm just sending a message to renew my 30mg Adderall xr.  Thank you

## 2022-03-14 ENCOUNTER — NURSE ONLY (OUTPATIENT)
Dept: FAMILY MEDICINE CLINIC | Age: 27
End: 2022-03-14
Payer: COMMERCIAL

## 2022-03-14 DIAGNOSIS — J30.2 SEASONAL ALLERGIES: Primary | ICD-10-CM

## 2022-03-14 PROCEDURE — 96372 THER/PROPH/DIAG INJ SC/IM: CPT | Performed by: FAMILY MEDICINE

## 2022-03-14 RX ORDER — TRIAMCINOLONE ACETONIDE 40 MG/ML
60 INJECTION, SUSPENSION INTRA-ARTICULAR; INTRAMUSCULAR ONCE
Status: COMPLETED | OUTPATIENT
Start: 2022-03-14 | End: 2022-03-14

## 2022-03-14 RX ADMIN — TRIAMCINOLONE ACETONIDE 60 MG: 40 INJECTION, SUSPENSION INTRA-ARTICULAR; INTRAMUSCULAR at 10:47

## 2022-03-14 NOTE — PROGRESS NOTES
Administrations This Visit     triamcinolone acetonide (KENALOG-40) injection 60 mg     Admin Date  03/14/2022  10:47 Action  Given Dose  60 mg Route  IntraMUSCular Site  Dorsogluteal Right Administered By  Taylor Chester LPN    Ordering Provider: Shea Pratt MD    NDC: 7057-6861-80    Lot#: [id number]    : [employer] (PRIMARY CARE)    Patient Supplied?: No                Patient instructed to report any adverse reaction to me immediately.

## 2022-03-23 ENCOUNTER — HOSPITAL ENCOUNTER (EMERGENCY)
Age: 27
Discharge: HOME | End: 2022-03-23
Payer: COMMERCIAL

## 2022-03-23 VITALS
SYSTOLIC BLOOD PRESSURE: 136 MMHG | BODY MASS INDEX: 35.78 KG/M2 | HEIGHT: 73 IN | TEMPERATURE: 98.5 F | HEART RATE: 79 BPM | DIASTOLIC BLOOD PRESSURE: 70 MMHG | WEIGHT: 270 LBS | RESPIRATION RATE: 16 BRPM | OXYGEN SATURATION: 96 %

## 2022-03-23 DIAGNOSIS — J06.9 VIRAL URI WITH COUGH: Primary | ICD-10-CM

## 2022-03-23 DIAGNOSIS — J04.0 LARYNGITIS: ICD-10-CM

## 2022-03-23 PROCEDURE — 99213 OFFICE O/P EST LOW 20 MIN: CPT

## 2022-03-23 PROCEDURE — 99213 OFFICE O/P EST LOW 20 MIN: CPT | Performed by: NURSE PRACTITIONER

## 2022-03-23 RX ORDER — FLUTICASONE PROPIONATE 50 MCG
1 SPRAY, SUSPENSION (ML) NASAL DAILY
Qty: 16 G | Refills: 0 | Status: SHIPPED | OUTPATIENT
Start: 2022-03-23 | End: 2022-06-21

## 2022-03-23 RX ORDER — AMOXICILLIN 500 MG/1
500 CAPSULE ORAL 2 TIMES DAILY
Qty: 14 CAPSULE | Refills: 0 | Status: SHIPPED | OUTPATIENT
Start: 2022-03-23 | End: 2022-03-30

## 2022-03-23 ASSESSMENT — PAIN SCALES - GENERAL: PAINLEVEL_OUTOF10: 4

## 2022-03-23 ASSESSMENT — PAIN DESCRIPTION - LOCATION: LOCATION: THROAT;RIB CAGE

## 2022-03-23 ASSESSMENT — PAIN DESCRIPTION - ORIENTATION: ORIENTATION: RIGHT;MID

## 2022-03-23 ASSESSMENT — ENCOUNTER SYMPTOMS
VOICE CHANGE: 1
COUGH: 1
SORE THROAT: 1
SHORTNESS OF BREATH: 0

## 2022-03-23 ASSESSMENT — PAIN - FUNCTIONAL ASSESSMENT: PAIN_FUNCTIONAL_ASSESSMENT: 0-10

## 2022-03-23 NOTE — ED PROVIDER NOTES
[facility]       CHIEF COMPLAINT       Chief Complaint   Patient presents with    Cough     sore throat laryngitis       Nurses Notes reviewed and I agree except as noted in the HPI. HISTORY OF PRESENT ILLNESS   Destini Frausto is a 27 y.o. male who presents with complaints of cough, sore throat, and hoarse voice. He believes he has an upper respiratory infection. However, he has been coughing up green thick mucus. He has tried over-the-counter NyQuil and DayQuil without much relief. He denies any fever or chills. He is unsure of anything that makes his symptoms better or worse. The history is provided by the patient. REVIEW OF SYSTEMS     Review of Systems   Constitutional: Negative for chills and fever. HENT: Positive for sore throat and voice change. Respiratory: Positive for cough. Negative for shortness of breath. Cardiovascular: Negative for chest pain. Musculoskeletal: Negative for myalgias. Neurological: Negative for headaches. PAST MEDICAL HISTORY         Diagnosis Date    ADHD (attention deficit hyperactivity disorder)     Asthma        SURGICALHISTORY     Patient  has a past surgical history that includes Clavicle surgery; Tonsillectomy and Adenoidectomy; Hand surgery (Right); and shoulder surgery.     CURRENT MEDICATIONS       Discharge Medication List as of 3/23/2022  7:24 PM      CONTINUE these medications which have NOT CHANGED    Details   amphetamine-dextroamphetamine (ADDERALL XR) 30 MG extended release capsule take 1 capsule by mouth every morning, Disp-30 capsule, R-0Normal      albuterol sulfate HFA (VENTOLIN HFA) 108 (90 Base) MCG/ACT inhaler Inhale 2 puffs into the lungs every 6 hours as needed for Wheezing, Disp-18 g, R-2Normal      cetirizine (ZYRTEC ALLERGY) 10 MG tablet Take 10 mg by mouth dailyHistorical Med      Misc Natural Products (JOINT HEALTH) CAPS Take by mouthHistorical Med      Multiple Vitamins-Minerals (MENS MULTIVITAMIN) TABS Take by mouthHistorical Med      Omega-3 Fatty Acids (FISH OIL) 1000 MG CAPS Take 3,000 mg by mouth dailyHistorical Med      5-Hydroxytryptophan (5-HTP MAXIMUM STRENGTH) 200 MG CAPS Take by mouthHistorical Med             ALLERGIES     Patient is is allergic to erythromycin. Patients   Immunization History   Administered Date(s) Administered    Tdap (Boostrix, Adacel) 08/07/2016, 02/14/2022       FAMILY HISTORY     Patient's family history includes Blindness in his maternal grandmother; Cancer in his paternal grandfather; Cataracts in his maternal grandmother; Diabetes in his maternal grandfather and mother; Hearing Loss in his mother; Heart Disease in his mother; High Blood Pressure in his mother; High Cholesterol in his maternal grandfather. SOCIAL HISTORY     Patient  reports that he has never smoked. He has never used smokeless tobacco. He reports current alcohol use. Drug: Marijuana Fredick Copping). PHYSICAL EXAM     ED TRIAGE VITALS  BP: 136/70, Temp: 98.5 °F (36.9 °C), Pulse: 79, Resp: 16, SpO2: 96 %,Estimated body mass index is 35.62 kg/m² as calculated from the following:    Height as of this encounter: 6' 1\" (1.854 m). Weight as of this encounter: 270 lb (122.5 kg). ,No LMP for male patient. Physical Exam  Vitals and nursing note reviewed. Constitutional:       General: He is not in acute distress. HENT:      Nose: Rhinorrhea present. No congestion. Mouth/Throat:      Mouth: Mucous membranes are moist.      Pharynx: Posterior oropharyngeal erythema present. Cardiovascular:      Rate and Rhythm: Normal rate and regular rhythm. Heart sounds: Normal heart sounds. Pulmonary:      Effort: Pulmonary effort is normal.      Breath sounds: Normal breath sounds. Lymphadenopathy:      Cervical: No cervical adenopathy. Skin:     General: Skin is warm and dry. Neurological:      Mental Status: He is alert and oriented to person, place, and time. DIAGNOSTIC RESULTS     Labs:No results found for this visit on 03/23/22. IMAGING:  None    EKG:  None    URGENT CARE COURSE:     Vitals:    03/23/22 1908   BP: 136/70   Pulse: 79   Resp: 16   Temp: 98.5 °F (36.9 °C)   SpO2: 96%   Weight: 270 lb (122.5 kg)   Height: 6' 1\" (1.854 m)       Medications - No data to display       PROCEDURES:  None    FINAL IMPRESSION      1. Viral URI with cough    2. Laryngitis      DISPOSITION/ PLAN   DISPOSITION Decision To Discharge 03/23/2022 07:23:14 PM     Clinical exam consistent with acute upper respiratory infection with cough. Patient has laryngitis as well. Discussed typical viral illnesses and symptom management. However, patient has requesting treatment with an antibiotic. Agreed to provide a week of amoxicillin. Will provide Flonase and instructed patient to continue use of over-the-counter medications for symptom management. Patient voiced understanding was agreeable to above-mentioned plan. Patient was discharged in stable condition.     PATIENT REFERRED TO:  Author MD Anita  [address] / [place]:  Discharge Medication List as of 3/23/2022  7:24 PM      START taking these medications    Details   amoxicillin (AMOXIL) 500 MG capsule Take 1 capsule by mouth 2 times daily for 7 days, Disp-14 capsule, R-0Normal      fluticasone (FLONASE) 50 MCG/ACT nasal spray 1 spray by Each Nostril route daily, Disp-16 g, R-0Normal             Discharge Medication List as of 3/23/2022  7:24 PM          Discharge Medication List as of 3/23/2022  7:24 PM          HORACIO Solorio CNP    (Please note that portions of this note were completed with a voice recognition program. Efforts were made to edit the dictations but occasionally words are mis-transcribed.)           HORACIO Solorio CNP  03/23/22 1933

## 2022-03-24 ENCOUNTER — PATIENT MESSAGE (OUTPATIENT)
Dept: FAMILY MEDICINE CLINIC | Age: 27
End: 2022-03-24

## 2022-03-24 ENCOUNTER — TELEPHONE (OUTPATIENT)
Dept: FAMILY MEDICINE CLINIC | Age: 27
End: 2022-03-24

## 2022-03-24 DIAGNOSIS — F90.9 ATTENTION DEFICIT HYPERACTIVITY DISORDER (ADHD), UNSPECIFIED ADHD TYPE: ICD-10-CM

## 2022-03-24 RX ORDER — DEXTROAMPHETAMINE SACCHARATE, AMPHETAMINE ASPARTATE MONOHYDRATE, DEXTROAMPHETAMINE SULFATE AND AMPHETAMINE SULFATE 7.5; 7.5; 7.5; 7.5 MG/1; MG/1; MG/1; MG/1
CAPSULE, EXTENDED RELEASE ORAL
Qty: 30 CAPSULE | Refills: 0 | Status: SHIPPED | OUTPATIENT
Start: 2022-03-24 | End: 2022-04-25 | Stop reason: SDUPTHER

## 2022-03-24 NOTE — TELEPHONE ENCOUNTER
From: Desiree Pederson  To: Dr. Erika Mercer: 3/24/2022 4:15 PM EDT  Subject: Adderall 30mg XR    I need to renew my prescription please.

## 2022-04-12 NOTE — PROGRESS NOTES
[facility] and [address] [facility] initial consultation note    Hao Jacobo                                                Chief complaint: Hao Jacobo is a 27 y. o.oldmale came for further evaluation regarding his ?sleep apnea  with referral from Dr. Kelley Raphael MD.    [place]:    Sleep/Wake schedule:  Usual time to go to bed during the work/regular day of week: 7 AM to 8 AM-variable  Usual time to wake up during the work//regular day of week: 3:30 PM to 4 PM- variable  He is currently working a night shift. He is currently working for [employer]. He goes to work at [employer] PM  He completes his work at [employer] AM  He works 3 to 4 days in a week and the above work schedule. On his off days and weekends   His sleep schedule varies. He goes to sleep anywhere from 7 AM to 3 PM-variable  He wakes up on his off days at 12 midnight-variable    Over the weekends his sleep schedule: Variable as above  He usually falls a sleep in less than: 10 minutes to 90 minutes  He takes naps: No.       Sleep Hygiene:  Is the temperature and evironment in his bed room is acceptable to him: Yes. He watches Television in his bed room: Yes-very rarely. He read books, study, pay bills etc in the bed: Yes. He works with his smart phone before going to sleep for approximately 20 minutes  Frequency He wake up during night/sleep: 0-4  Majority of nocturnal awakenings are for urination: Yes.  0-1  Difficulty in falling back to sleep after nocturnal awakenings: Yes. .  Do you drink coffee: Yes. He can drink from 0-1 pot of coffee per day. Do you drink caffeinated beverages i.e sodas: Yes. He drinks caffeinated soda 0-1 time per day. He drinks energy drinks like monster every day  Do you drink tea: Yes. He drinks tea occasionally  Do you drink alcoholic beverages: Yes.   He drinks alcohol occasionally  History of recreational drug use: No    Social History Tobacco Use    Smoking status: Never Smoker    Smokeless tobacco: Never Used   Vaping Use    Vaping Use: Never used   Substance Use Topics    Alcohol use: Yes     Comment: occasional    Drug use: Not on file     Comment: on occasion        Sleep apnea symptoms:  Noticed to have loud snoring:Yes. Noted by his family member-Wife  Witnessed apneas during sleep noticed: No.   History of choking and gasping sensation at night time: Yes. History of headaches in the morning:Yes. Rarely I  History of dry mouth in the morning: Yes. Frequently  History of palpitations during night time/nocturnal awakenings: No.   History of sweating during night time/nocturnal awakenings: No.      General:  History of head injury in the past: No.   History of seizures: No  Rest less legs syndrome symptoms:NO  History suggestive of periodic limb movements during sleep: NO  History suggestive of hypnagogic hallucinations: NO  History suggestive of hypnopompic hallucinations: NO  History suggestive of sleep talking:NO  History suggestive of sleep walking:NO  History suggestive of bruxism: Yes[x] No mouth guard. He denies any recent episodes of bruxism. History suggestive of cataplexy: NO  History suggestive of sleep paralysis: NO    Family history of sleep disorders:  Family history of obstructive sleep apnea: Yes. His grandfather was diagnosed with obstructive sleep apnea. His grand father  several years back  Family history of Narcolepsy: No.   Family history of Rest less legs syndrome : No.       History regarding old sleep studies:  Prior history of sleep study: No.  Using CPAP device: No.  Currently using home Oxygen: NO.       Patient considerations:  Is the patient is ambulatory: Yes  Patient is currently using: None of these Wheelchair, Delmar Layman or [employer].   Para/Quadriplegic: NO  Hearing deficit : NO  Claustrophobic: NO  MDD : NO  Blind: NO  Incontinent: NO  Para/Quadraplegi: NO.   Need transportation to and from Sleep Center:NO      Social History:  Social History     Tobacco Use    Smoking status: Never Smoker    Smokeless tobacco: Never Used   Vaping Use    Vaping Use: Never used   Substance Use Topics    Alcohol use: Yes     Comment: occasional    Drug use: Not on file     Comment: on occasion    . He is currently working: Yes. See HPI for details                             Past Medical History:   Diagnosis Date    ADHD (attention deficit hyperactivity disorder)     Asthma        Past Surgical History:   Procedure Laterality Date    CLAVICLE SURGERY      HAND SURGERY Right     SHOULDER SURGERY      TONSILLECTOMY AND ADENOIDECTOMY         Allergies   Allergen Reactions    Erythromycin Rash       Current Outpatient Medications   Medication Sig Dispense Refill    Fexofenadine-Pseudoephedrine (ALLEGRA-D 24 HOUR PO) Take by mouth      amphetamine-dextroamphetamine (ADDERALL XR) 30 MG extended release capsule take 1 capsule by mouth every morning 30 capsule 0    fluticasone (FLONASE) 50 MCG/ACT nasal spray 1 spray by Each Nostril route daily 16 g 0    albuterol sulfate HFA (VENTOLIN HFA) 108 (90 Base) MCG/ACT inhaler Inhale 2 puffs into the lungs every 6 hours as needed for Wheezing 18 g 2    Misc Natural Products (JOINT HEALTH) CAPS Take by mouth      Multiple Vitamins-Minerals (MENS MULTIVITAMIN) TABS Take by mouth      Omega-3 Fatty Acids (FISH OIL) 1000 MG CAPS Take 3,000 mg by mouth daily      5-Hydroxytryptophan (5-HTP MAXIMUM STRENGTH) 200 MG CAPS Take by mouth      cetirizine (ZYRTEC ALLERGY) 10 MG tablet Take 10 mg by mouth daily (Patient not taking: Reported on 5/11/2022)       No current facility-administered medications for this visit.        Family History   Problem Relation Age of Onset    Diabetes Mother     Heart Disease Mother     High Blood Pressure Mother     Hearing Loss Mother     Cataracts Maternal Grandmother     Blindness Maternal Grandmother     Diabetes Maternal Grandfather  High Cholesterol Maternal Grandfather     Cancer Paternal Grandfather         Review of Systems:   General/Constitutional: He gained approximately 40 pounds of weight in the last 3 years with a normal appetite. No fever or chills. HENT: Negative. Eyes: Negative. Upper respiratory tract: Frequent nasal stuffiness with post nasal drip. He is currently using over-the-counter Allegra-D p.o. nightly along with the Flonase and Obion pot. He follows with his family physician. Lower respiratory tract/ lungs: No cough or sputum production. No hemoptysis. Cardiovascular: No palpitations or chest pain. Gastrointestinal: No nausea or vomiting. Neurological: No focal neurologiacal weakness. Extremities: No edema. Musculoskeletal: No complaints. Genitourinary: No complaints. Hematological: Negative. Psychiatric/Behavioral: Negative. Skin: No itching. /80 (Site: Left Upper Arm, Position: Sitting, Cuff Size: Large Adult)   Pulse 80   Temp 97.6 °F (36.4 °C)   Ht 6' 1\" (1.854 m)   Wt 272 lb 6.4 oz (123.6 kg)   SpO2 97% Comment: room air at rest  BMI 35.94 kg/m²   BMI:  Body mass index is 35.94 kg/m². Mallampati airway Class:2  Neck Circumference:.17 Inches  [place] sleepiness score 5/11/22: 9  ( On further questioning he gives a history of hypersomnia ( Excessive daytime sleepiness) with daytime sleepy attacks. No reported motor vehicle accidents due to his sleepiness). Sleep apnea quality of life questionnaire:.57    Physical Exam:   Nursing note and vitals reviewed. Constitutional: Patient appears well built and well nourished. No distress. Patient is oriented to person, place, and time. HENT:   Head: Normocephalic and atraumatic. Right Ear: External ear normal.   Left Ear: External ear normal.   Mouth/Throat: Oropharynx is clear and moist.  No oral thrush. Eyes: Conjunctivae are normal. Pupils are equal, round, and reactive to light. No scleral icterus. Neck: Neck supple.  No JVD present. No tracheal deviation present. Cardiovascular: Normal rate, regular rhythm, normal heart sounds. No murmur heard. Pulmonary/Chest: Effort normal and breath sounds normal. No stridor. No respiratory distress. No wheezes. No rales. Patient exhibits no tenderness. Abdominal: Soft. Patient exhibits no distension. No tenderness. Musculoskeletal: Normal range of motion. Extremities: Patient exhibits no edema and no tenderness. Lymphadenopathy:  No cervical adenopathy. Neurological: Patient is alert and oriented to person, place, and time. Skin: Skin is warm and dry. Patient is not diaphoretic. Psychiatric: Patient  has a normal mood and affect. Patient behavior is normal.     Diagnostic Data:  None related sleep. Assessment:  -Snoring without witnessed apneas,frequent nocturnal awakenings and excessive daytime sleepiness to evaluate for obstructive sleep apnea. -Inadequate sleep hygiene.  -Hypersomnia ( Excessive daytime sleepiness) may be due to obstructive sleep apnea Vs Inadequate sleep hygiene VS shiftwork sleep disorder.  -Night shift worker  -Attention deficit hyperactive disorder on treatment with Adderall XR-he follows with his family physician. He receives Adderall prescription from his family physician.  -Allergic rhinitis on treatment with Flonase and Allegra-D.  -Mild intermittent bronchial asthma. He is currently on treatment with albuterol HFA 2 puffs every 6 hourly as needed. He follows with his family physician. He was diagnosed with bronchial asthma during childhood.  -Deviated nasal septum of the right side. He was never evaluated by ENT physician so far. Recommendations/Plan:  -He was advised to continue Melatonin 3mg PO daily at bed time PRN. He is currently using melatonin from over-the-counter.  -Will schedule patient for polysomnogram in the sleep lab.    -I had a discussion with patient regarding avialable treatment options for his sleep disorder breathing including but not limited to CPAP titration in the sleep lab Vs.Dental appliance placement with referral to a local dentist Vs other available surgical options including Uvulopalatopharyngoplasty, maxillomandibular ostomy, inspire device placement and tracheostomy as last option. At the end of discussion, he is not decided on his   treatment if he found to have obstructive sleep apnea at this time.  -Schedule patient for Ear, Nose and Throat specialist consultation Dr. Luis Angel Godinez MD as soon as possible for further evaluation of deviated nasal septum to the right side causing frequent nasal stuffiness.  -He was advised to use over the counter mouth guard for his bruxism after checking with his dentist. He verbalizes understanding.  -Patient educated about shift work sleep disorder and mechanism. He was advised to maintain fixed sleep schedule.   -We will see Najma Bustillo back in 1week after the sleep study to go over the sleep study results and further management options.  -He was educated to practice good sleep hygiene practices. He was provided with a good sleep hygiene hand out.  -Dom Dumont was advised to make earlier appointment with my clinic if he develops any worsening of sleep symptoms. He verbalizes understanding.  -Dom Dumont was advised to not to drive any motor vehicles or operate heavy equipment until his sleep symptoms are under good control. Najma Bustillo verbalizes understanding.  -He was advised to loose weight by controlling diet and doing exercise once cleared by his family physician.   - Najma Bustillo was educated about my impression and plan. He verbalizes understanding.      -I personally reviewed updated the Past medical hx, Past surgical hx,Social hx, Family hx, Medications, Allergies in the discrete data section of the patient chart along with labs, Pulmonary medicine,Sleep medicine related, Pathological, Microbiological and Radiological investigations.

## 2022-04-19 ENCOUNTER — HOSPITAL ENCOUNTER (EMERGENCY)
Age: 27
Discharge: HOME | End: 2022-04-19
Attending: EMERGENCY MEDICINE
Payer: COMMERCIAL

## 2022-04-19 VITALS
TEMPERATURE: 97.6 F | SYSTOLIC BLOOD PRESSURE: 139 MMHG | HEART RATE: 79 BPM | RESPIRATION RATE: 16 BRPM | BODY MASS INDEX: 35.78 KG/M2 | HEIGHT: 73 IN | DIASTOLIC BLOOD PRESSURE: 89 MMHG | WEIGHT: 270 LBS | OXYGEN SATURATION: 97 %

## 2022-04-19 DIAGNOSIS — J32.1 CHRONIC FRONTAL SINUSITIS: Primary | ICD-10-CM

## 2022-04-19 PROCEDURE — 99213 OFFICE O/P EST LOW 20 MIN: CPT

## 2022-04-19 PROCEDURE — 99213 OFFICE O/P EST LOW 20 MIN: CPT | Performed by: EMERGENCY MEDICINE

## 2022-04-19 RX ORDER — BENZONATATE 200 MG/1
200 CAPSULE ORAL 3 TIMES DAILY PRN
Qty: 30 CAPSULE | Refills: 0 | Status: SHIPPED | OUTPATIENT
Start: 2022-04-19 | End: 2022-04-26

## 2022-04-19 RX ORDER — DOXYCYCLINE HYCLATE 100 MG
100 TABLET ORAL 2 TIMES DAILY
Qty: 14 TABLET | Refills: 0 | Status: SHIPPED | OUTPATIENT
Start: 2022-04-19 | End: 2022-04-26

## 2022-04-19 ASSESSMENT — ENCOUNTER SYMPTOMS
ABDOMINAL PAIN: 0
COUGH: 1
SHORTNESS OF BREATH: 0
CONSTIPATION: 0
DIARRHEA: 0
EYE PAIN: 0

## 2022-04-19 NOTE — ED PROVIDER NOTES
[address]       Chief Complaint   Patient presents with    Nasal Congestion    Cough    Pharyngitis       Nurses Notes reviewed and I agree except as noted in the HPI. HISTORY OF PRESENT ILLNESS   Miah Sánchez is a 27 y.o. male w/ PMh of asthma, allergies, and recent COVID who presents for sinus congestion. Eight days of symptoms. Has cough, no fever, no lymphadenopathy, and no exudate. No noted shortness of air. Has some congestion as well. Has been eating and drinking well. Normal urination and stools. Has no sick contacts, no known COVID exposure. Of note, given amoxil on 3/23 for similar symptoms but symptoms persisted despite this. No headaches, fatigue, muscle aches, or rashes. No major birth history, not premature or requiring NICU stay. No asthma history or wheezing. Pt has not been taking his allergy medications. REVIEW OF SYSTEMS     Review of Systems   Constitutional: Negative for chills, fatigue and fever. HENT: Positive for congestion. Negative for ear pain. Eyes: Negative for pain and visual disturbance. Respiratory: Positive for cough. Negative for shortness of breath. Cardiovascular: Negative for chest pain and leg swelling. Gastrointestinal: Negative for abdominal pain, constipation and diarrhea. Genitourinary: Negative for difficulty urinating, dysuria and hematuria. Musculoskeletal: Negative for arthralgias and myalgias. Allergic/Immunologic: Negative for environmental allergies. Neurological: Negative for dizziness and headaches. Psychiatric/Behavioral: Negative for behavioral problems and sleep disturbance. PAST MEDICAL HISTORY         Diagnosis Date    ADHD (attention deficit hyperactivity disorder)     Asthma        SURGICAL HISTORY     Patient  has a past surgical history that includes Clavicle surgery; Tonsillectomy and Adenoidectomy;  Hand surgery (Right); and shoulder surgery. CURRENT MEDICATIONS       Discharge Medication List as of 4/19/2022  3:53 PM      CONTINUE these medications which have NOT CHANGED    Details   amphetamine-dextroamphetamine (ADDERALL XR) 30 MG extended release capsule take 1 capsule by mouth every morning, Disp-30 capsule, R-0Normal      fluticasone (FLONASE) 50 MCG/ACT nasal spray 1 spray by Each Nostril route daily, Disp-16 g, R-0Normal      albuterol sulfate HFA (VENTOLIN HFA) 108 (90 Base) MCG/ACT inhaler Inhale 2 puffs into the lungs every 6 hours as needed for Wheezing, Disp-18 g, R-2Normal      cetirizine (ZYRTEC ALLERGY) 10 MG tablet Take 10 mg by mouth dailyHistorical Med      Misc Natural Products (JOINT HEALTH) CAPS Take by mouthHistorical Med      Multiple Vitamins-Minerals (MENS MULTIVITAMIN) TABS Take by mouthHistorical Med      Omega-3 Fatty Acids (FISH OIL) 1000 MG CAPS Take 3,000 mg by mouth dailyHistorical Med      5-Hydroxytryptophan (5-HTP MAXIMUM STRENGTH) 200 MG CAPS Take by mouthHistorical Med             ALLERGIES     Patient is is allergic to erythromycin. FAMILY HISTORY     Patient'sfamily history includes Blindness in his maternal grandmother; Cancer in his paternal grandfather; Cataracts in his maternal grandmother; Diabetes in his maternal grandfather and mother; Hearing Loss in his mother; Heart Disease in his mother; High Blood Pressure in his mother; High Cholesterol in his maternal grandfather. SOCIAL HISTORY     Patient  reports that he has never smoked. He has never used smokeless tobacco. He reports current alcohol use. Drug: Marijuana Melissa Awkward). PHYSICAL EXAM     ED TRIAGE VITALS  BP: 139/89, Temp: 97.6 °F (36.4 °C), Pulse: 79, Resp: 16, SpO2: 97 %  Physical Exam  Vitals and nursing note reviewed. Constitutional:       Appearance: Normal appearance. HENT:      Head: Normocephalic and atraumatic.       Nose: Nose normal.      Mouth/Throat:      Mouth: Mucous membranes are moist.      Pharynx: Oropharynx is clear. Eyes:      Extraocular Movements: Extraocular movements intact. Pupils: Pupils are equal, round, and reactive to light. Cardiovascular:      Rate and Rhythm: Normal rate and regular rhythm. Pulses: Normal pulses. Heart sounds: Normal heart sounds. Pulmonary:      Effort: Pulmonary effort is normal.      Breath sounds: Normal breath sounds. Abdominal:      General: Abdomen is flat. Bowel sounds are normal.   Musculoskeletal:         General: No signs of injury. Normal range of motion. Cervical back: Normal range of motion and neck supple. Skin:     General: Skin is warm and dry. Capillary Refill: Capillary refill takes less than 2 seconds. Neurological:      General: No focal deficit present. Mental Status: He is alert and oriented to person, place, and time. Mental status is at baseline. Psychiatric:         Mood and Affect: Mood normal.         Behavior: Behavior normal.         DIAGNOSTIC RESULTS   Labs:No results found for this visit on 04/19/22. IMAGING:  No orders to display     URGENT CARE COURSE:     Vitals:    04/19/22 1535   BP: 139/89   Pulse: 79   Resp: 16   Temp: 97.6 °F (36.4 °C)   TempSrc: Temporal   SpO2: 97%   Weight: 270 lb (122.5 kg)   Height: 6' 1\" (1.854 m)       Medications - No data to display  PROCEDURES:  FINALIMPRESSION      1. Chronic frontal sinusitis        DISPOSITION/PLAN   DISPOSITION Decision To Discharge 04/19/2022 03:53:03 PM    Patient was seen and evaluated here in the urgent care. Patient was in no acute distress. Vitals signs were within normal limits. Nontoxic, well-hydrated, normal airway. No airway abscess or epiglottitis, sepsis, CNS infection, pneumonia, hypoxia, bronchospasm. No ACS, CHF, PE. Patient has acute sinusitis.   Will treat with doxycycline, tessalon, sinus rinse, Coricidin HBP, Tylenol, increased oral clear liquids, vaporizer, rest.  Patient understands to avoid all other over-the-counter medications. Patient to follow-up with family medicine practice for recheck within 5 days, and he understands to go to ED if worse.     PATIENT REFERRED TO:  Sheba Ortiz MD  [address] [place] [facility] [address]  [phone]    In 1 week      DISCHARGE MEDICATIONS:  Discharge Medication List as of 4/19/2022  3:53 PM      START taking these medications    Details   doxycycline hyclate (VIBRA-TABS) 100 MG tablet Take 1 tablet by mouth 2 times daily for 7 days, Disp-14 tablet, R-0Normal      benzonatate (TESSALON) 200 MG capsule Take 1 capsule by mouth 3 times daily as needed for Cough, Disp-30 capsule, R-0Normal           Discharge Medication List as of 4/19/2022  3:53 PM          MD David Navarro MD  Resident  04/19/22 [id number]

## 2022-04-19 NOTE — ED NOTES
To [facility] with complaints of cough, nasal congestion, sore throat. Started a week ago.       Gregorio Fairbanks RN  04/19/22 [id number]

## 2022-04-19 NOTE — ED PROVIDER NOTES
[address]     No chief complaint on file. Nurses Notes reviewed and I agree except as noted in the HPI. HISTORY OF PRESENT ILLNESS   Barrie Huerta is a 27 y.o. male who presents with sinusitis. Juliet Otto MD,  personally performed and participated in key or critical portions of the evaluation and management including personally performing the exam and medical decision making. I verify the  accuracy of the documentation by the resident.   Please review resident note for specifics and further details of this urgent care evaluation    Electronically signed by Yi Garsia MD on 4/19/2022 at 3:27 PM       Yi Garsia MD  04/19/22 [id number]

## 2022-04-20 ENCOUNTER — TELEPHONE (OUTPATIENT)
Dept: FAMILY MEDICINE CLINIC | Age: 27
End: 2022-04-20

## 2022-04-25 ENCOUNTER — PATIENT MESSAGE (OUTPATIENT)
Dept: FAMILY MEDICINE CLINIC | Age: 27
End: 2022-04-25

## 2022-04-25 DIAGNOSIS — F90.9 ATTENTION DEFICIT HYPERACTIVITY DISORDER (ADHD), UNSPECIFIED ADHD TYPE: ICD-10-CM

## 2022-04-25 RX ORDER — DEXTROAMPHETAMINE SACCHARATE, AMPHETAMINE ASPARTATE MONOHYDRATE, DEXTROAMPHETAMINE SULFATE AND AMPHETAMINE SULFATE 7.5; 7.5; 7.5; 7.5 MG/1; MG/1; MG/1; MG/1
CAPSULE, EXTENDED RELEASE ORAL
Qty: 30 CAPSULE | Refills: 0 | Status: SHIPPED | OUTPATIENT
Start: 2022-04-25 | End: 2022-07-04 | Stop reason: SDUPTHER

## 2022-04-25 NOTE — TELEPHONE ENCOUNTER
From: Zandra Bryant  To: Dr. Zuleima Womack: 4/25/2022 5:52 AM EDT  Subject: Adderall 30mg     I need to renew my prescription please.

## 2022-05-05 NOTE — TELEPHONE ENCOUNTER
Would recommend trying vyvanse as it is similar to adderall. Please let us know when he is out of current script and we can send in vyvanse.

## 2022-05-11 ENCOUNTER — INITIAL CONSULT (OUTPATIENT)
Dept: PULMONOLOGY | Age: 27
End: 2022-05-11
Payer: COMMERCIAL

## 2022-05-11 VITALS
HEART RATE: 80 BPM | WEIGHT: 272.4 LBS | OXYGEN SATURATION: 97 % | DIASTOLIC BLOOD PRESSURE: 80 MMHG | HEIGHT: 73 IN | BODY MASS INDEX: 36.1 KG/M2 | SYSTOLIC BLOOD PRESSURE: 122 MMHG | TEMPERATURE: 97.6 F

## 2022-05-11 DIAGNOSIS — G47.10 HYPERSOMNIA: ICD-10-CM

## 2022-05-11 DIAGNOSIS — J34.2 DNS (DEVIATED NASAL SEPTUM): ICD-10-CM

## 2022-05-11 DIAGNOSIS — R06.83 SNORING: Primary | ICD-10-CM

## 2022-05-11 PROCEDURE — G8417 CALC BMI ABV UP PARAM F/U: HCPCS | Performed by: INTERNAL MEDICINE

## 2022-05-11 PROCEDURE — 1036F TOBACCO NON-USER: CPT | Performed by: INTERNAL MEDICINE

## 2022-05-11 PROCEDURE — G8427 DOCREV CUR MEDS BY ELIG CLIN: HCPCS | Performed by: INTERNAL MEDICINE

## 2022-05-11 PROCEDURE — 99204 OFFICE O/P NEW MOD 45 MIN: CPT | Performed by: INTERNAL MEDICINE

## 2022-05-11 NOTE — PROGRESS NOTES
Chief Complaint: Homar Patricia is a new sleep consult no prior studies    Mallampati airway Class:2  Neck Circumference:.17 Inches    [place] sleepiness score 5/11/22: 9  Sleep apnea quality of life questionnaire:.57

## 2022-05-16 ENCOUNTER — TELEPHONE (OUTPATIENT)
Dept: SLEEP CENTER | Age: 27
End: 2022-05-16

## 2022-05-16 DIAGNOSIS — J34.2 DNS (DEVIATED NASAL SEPTUM): ICD-10-CM

## 2022-05-16 DIAGNOSIS — R06.83 SNORING: Primary | ICD-10-CM

## 2022-05-16 DIAGNOSIS — G47.10 HYPERSOMNIA: ICD-10-CM

## 2022-05-16 NOTE — TELEPHONE ENCOUNTER
Marquise JEREZ,    Home/Portable sleep test was ordred in [address] Please schedule it and inform patient. Patient to follow with my clinic/Ms. Coreen Charles,PRINCE/MsRicha Leone CNP/Mr. Lacho Gallardo,PRINCE/Ms [employer] [facility] in 1 week after the Home/Portable sleep test for further management.       Yours sincerely,  Electronically signed by   Kimberlee Ware MD on 5/16/2022 at 5:06 PM

## 2022-05-16 NOTE — TELEPHONE ENCOUNTER
[facility]  Maria Eugenia Ortiz MD; [facility] Clinical Support Pool; Capri Pratt; Joaquin Camarillo; [facility],   Per the [facility] clinical guidelines, patient doesn't meet criteria for an attended study approval. A HST does not require authorization. Thank you,   Angelo Arteaga.

## 2022-05-30 ENCOUNTER — PATIENT MESSAGE (OUTPATIENT)
Dept: FAMILY MEDICINE CLINIC | Age: 27
End: 2022-05-30

## 2022-05-30 DIAGNOSIS — F90.9 ATTENTION DEFICIT HYPERACTIVITY DISORDER (ADHD), UNSPECIFIED ADHD TYPE: Primary | ICD-10-CM

## 2022-05-31 NOTE — TELEPHONE ENCOUNTER
From: Niurka Francisco  To: Dr. Chica Campos: 5/30/2022 1:32 PM EDT  Subject: New prescription    I'm ready to fill my new prescription since my insurance no longer covers the Adderall XR 30MG

## 2022-06-01 ENCOUNTER — TELEPHONE (OUTPATIENT)
Dept: FAMILY MEDICINE CLINIC | Age: 27
End: 2022-06-01

## 2022-06-01 DIAGNOSIS — F90.9 ATTENTION DEFICIT HYPERACTIVITY DISORDER (ADHD), UNSPECIFIED ADHD TYPE: Primary | ICD-10-CM

## 2022-06-01 RX ORDER — DEXTROAMPHETAMINE SACCHARATE, AMPHETAMINE ASPARTATE MONOHYDRATE, DEXTROAMPHETAMINE SULFATE AND AMPHETAMINE SULFATE 7.5; 7.5; 7.5; 7.5 MG/1; MG/1; MG/1; MG/1
30 CAPSULE, EXTENDED RELEASE ORAL DAILY
Qty: 30 CAPSULE | Refills: 0 | Status: SHIPPED | OUTPATIENT
Start: 2022-06-01 | End: 2022-06-21

## 2022-06-01 NOTE — TELEPHONE ENCOUNTER
JUST FYI- Pt called the office to state that his Vyvanse is covered but that it was still going to cost pt $110. Pt stated that he has not checked with his insurance company as well as good rx on where would be the cheapest for pt. Pt asking if he should get medication filled. Nurse advised if he wants to pay that price then he can go a head but advised to hold off as this is a lot of money. Pt is asking if anything else could be called in? I advised that PCP was out of the office for the day and if he could check with insurance and call office back on what would be approved that would help the process go more quickly.  Pt stated that he would reach out and would call the office back

## 2022-06-01 NOTE — TELEPHONE ENCOUNTER
Pt called the office back stating that he checked with his insurance and was transferred to [address] [place] [address]. Pt was told that his insurance will cover the Adderall XR 30 mg tablets for a generic copay. Pt stated that this will cost him less than $10.    Pt is asking for this medication to be sent to [facility]. Medication pending to pharmacy. Pt is aware of PCP not being in office and that message would be addressed when she returns.

## 2022-06-01 NOTE — TELEPHONE ENCOUNTER
PA  Submitted today via [employer]        Response received. Approved today  Request Reference Number: [id number]. VYVANSE CAP 30MG is approved through 06/01/2023. Your patient may now fill this prescription and it will be covered.

## 2022-06-21 ENCOUNTER — OFFICE VISIT (OUTPATIENT)
Dept: ENT CLINIC | Age: 27
End: 2022-06-21
Payer: COMMERCIAL

## 2022-06-21 VITALS
HEIGHT: 73 IN | DIASTOLIC BLOOD PRESSURE: 72 MMHG | BODY MASS INDEX: 36.72 KG/M2 | WEIGHT: 277.1 LBS | OXYGEN SATURATION: 97 % | HEART RATE: 74 BPM | SYSTOLIC BLOOD PRESSURE: 122 MMHG | TEMPERATURE: 98.1 F

## 2022-06-21 DIAGNOSIS — G47.30 OBSERVED SLEEP APNEA: ICD-10-CM

## 2022-06-21 DIAGNOSIS — R40.0 DAYTIME SOMNOLENCE: ICD-10-CM

## 2022-06-21 DIAGNOSIS — J32.2 CHRONIC ETHMOIDAL SINUSITIS: ICD-10-CM

## 2022-06-21 DIAGNOSIS — J34.3 HYPERTROPHY OF INFERIOR NASAL TURBINATE: ICD-10-CM

## 2022-06-21 DIAGNOSIS — J34.2 NASAL SEPTAL DEVIATION: Primary | ICD-10-CM

## 2022-06-21 PROCEDURE — G8417 CALC BMI ABV UP PARAM F/U: HCPCS | Performed by: OTOLARYNGOLOGY

## 2022-06-21 PROCEDURE — 1036F TOBACCO NON-USER: CPT | Performed by: OTOLARYNGOLOGY

## 2022-06-21 PROCEDURE — 99204 OFFICE O/P NEW MOD 45 MIN: CPT | Performed by: OTOLARYNGOLOGY

## 2022-06-21 PROCEDURE — G8427 DOCREV CUR MEDS BY ELIG CLIN: HCPCS | Performed by: OTOLARYNGOLOGY

## 2022-06-21 RX ORDER — AMOXICILLIN AND CLAVULANATE POTASSIUM 875; 125 MG/1; MG/1
1 TABLET, FILM COATED ORAL 2 TIMES DAILY
Qty: 56 TABLET | Refills: 0 | Status: SHIPPED | OUTPATIENT
Start: 2022-06-21 | End: 2022-07-19

## 2022-06-21 NOTE — PATIENT INSTRUCTIONS
The rationale for prolonged antibiotics for sinusitis was discussed including the importance of recovery of the mucociliary transport mechanism to prevent reinfection from retained secretions. The patient will be given a four-week prescription for broad-spectrum antibiotics, and perform bid sinus irrigations. Then a CT of the paranasal sinuses without contrast will be obtained, and the patient will return for reevaluation. He will not use topical steroid nasal spray, because it causes nosebleeds. He found it did not help either. Do not use topical decongestant sprays or medications, menthol ointments or cough drops, or oral decongestants. Because tolerance of the turbinates and would make surgery impossible.

## 2022-06-21 NOTE — PROGRESS NOTES
[facility] [place] [facility]  [facility] EAR, NOSE AND THROAT  [facility] [place]  Dept: [phone]  Dept Fax: [phone]  Loc: [phone]    Martine Ruiz is a 27 y.o. male who was referred [facility] James for:  Chief Complaint   Patient presents with    New Patient     New patient is here for snoring and devated nasal septum. He started snoring after weight gain and post covid. He sometimes gasp for breath while sleeping. Referred by Dr. Dominick Doran   . HPI:     Martine Ruiz is a 27 y.o. male who presents today for ration of his nasal obstruction. His sleep medicine physician noted a deviated nasal septum. He has a history of loud snoring without observed apneas, dry mouth in the morning and daytime somnolence. He has a home sleep study pending. He also states he gets sinus infections a couple times a year that last more than a week or so. He has facial pressure and chronic stuffy nose. .  There has been no imaging of his head. He does not tolerate Flonase very well. He has tried it and it caused nosebleeds. It did not help    History:      Allergies   Allergen Reactions    Erythromycin Rash     Current Outpatient Medications   Medication Sig Dispense Refill    amoxicillin-clavulanate (AUGMENTIN) 875-125 MG per tablet Take 1 tablet by mouth 2 times daily for 28 days 56 tablet 0    Fexofenadine-Pseudoephedrine (ALLEGRA-D 24 HOUR PO) Take by mouth      amphetamine-dextroamphetamine (ADDERALL XR) 30 MG extended release capsule take 1 capsule by mouth every morning 30 capsule 0    albuterol sulfate HFA (VENTOLIN HFA) 108 (90 Base) MCG/ACT inhaler Inhale 2 puffs into the lungs every 6 hours as needed for Wheezing 18 g 2    Misc Natural Products (JOINT HEALTH) CAPS Take by mouth      Multiple Vitamins-Minerals (MENS MULTIVITAMIN) TABS Take by mouth      Omega-3 Fatty Acids (FISH OIL) 1000 MG CAPS Take 3,000 mg by mouth daily      5-Hydroxytryptophan (5-HTP MAXIMUM STRENGTH) 200 MG CAPS Take by mouth       No current facility-administered medications for this visit. Past Medical History:   Diagnosis Date    ADHD (attention deficit hyperactivity disorder)     Asthma       Past Surgical History:   Procedure Laterality Date    CLAVICLE SURGERY      HAND SURGERY Right     SHOULDER SURGERY      TONSILLECTOMY AND ADENOIDECTOMY       Family History   Problem Relation Age of Onset    Diabetes Mother     Heart Disease Mother     High Blood Pressure Mother     Hearing Loss Mother     Cataracts Maternal Grandmother     Blindness Maternal Grandmother     Diabetes Maternal Grandfather     High Cholesterol Maternal Grandfather     Cancer Paternal Grandfather      Social History     Tobacco Use    Smoking status: Never Smoker    Smokeless tobacco: Never Used   Substance Use Topics    Alcohol use: Yes     Comment: occasional       Subjective:      Review of Systems    Objective:   /72 (Site: Right Upper Arm, Position: Sitting)   Pulse 74   Temp 98.1 °F (36.7 °C) (Infrared)   Ht 6' 1\" (1.854 m)   Wt 277 lb 1.6 oz (125.7 kg)   SpO2 97%   BMI 36.56 kg/m²     Physical Exam  Vitals and nursing note reviewed. Constitutional:       Appearance: He is well-developed. HENT:      Head: Normocephalic and atraumatic. No laceration. Salivary Glands: Right salivary gland is not diffusely enlarged or tender. Left salivary gland is not diffusely enlarged or tender. Comments:        Right Ear: Hearing, tympanic membrane, ear canal and external ear normal. No drainage or swelling. No middle ear effusion. Tympanic membrane is not perforated or erythematous. Left Ear: Hearing, tympanic membrane, ear canal and external ear normal. No drainage or swelling. No middle ear effusion. Tympanic membrane is not perforated or erythematous.       Nose: Septal deviation ( Caudal margin is deviated to the left compromising the left nare, he has a an he has a convex septal deviation to the right that is significant. Left side turbinates are enlarged.) present. No mucosal edema or rhinorrhea. Left Turbinates: Enlarged. Mouth/Throat:      Mouth: Mucous membranes are moist. Mucous membranes are not pale and not dry. No oral lesions. Tongue: No lesions. Pharynx: Oropharynx is clear. Uvula midline. No oropharyngeal exudate or posterior oropharyngeal erythema. Comments: LIps: lips normal     Mallampati 2  Base of tongue: symmetric  Mirror exam showed a low larynx but the base of tongue is not enlarged and the velopharyngeal opening is adequate. He does have a significant vertical complement of base of tongue that would not be supported. Eyes:      Extraocular Movements: Extraocular movements intact. Comments: Conjugate gaze   Neck:      Thyroid: No thyroid mass or thyromegaly. Trachea: Phonation normal. No tracheal deviation. Comments:     Pulmonary:      Effort: Pulmonary effort is normal. No retractions. Breath sounds: No stridor. Musculoskeletal:      Cervical back: Normal range of motion and neck supple. Lymphadenopathy:      Cervical: No cervical adenopathy. Neurological:      Mental Status: He is alert and oriented to person, place, and time. Cranial Nerves: Cranial nerves are intact. Cranial nerve deficit: VIIth N function intact bilat. Psychiatric:         Mood and Affect: Mood and affect normal.         Behavior: Behavior is cooperative. Data:  All of the past medical history, past surgical history, family history,social history, allergies and current medications were reviewed with the patient. Assessment & Plan   Diagnoses and all orders for this visit:     Diagnosis Orders   1. Nasal septal deviation  CT FACIAL BONES WO CONTRAST   2. Hypertrophy of inferior nasal turbinate  CT FACIAL BONES WO CONTRAST   3. Observed sleep apnea  CT FACIAL BONES WO CONTRAST   4.  Daytime somnolence  CT FACIAL BONES WO CONTRAST   5. Chronic ethmoidal sinusitis  CT FACIAL BONES WO CONTRAST    amoxicillin-clavulanate (AUGMENTIN) 875-125 MG per tablet       The findings were explained and his questions were answered. He has significant nasal obstruction whether he gets CPAP or not he would benefit from having it corrected surgically. Any sinus disease should be taken care of at the same time. Does sound like he might have some chronic sinusitis. Consider whether he gets a sleep study for his nasal airway is correct it would be up to him, but if we are going to work on it anyway, he might want to defer the sleep study until after he has had the nasal airway corrected. His nasal airway and the low larynx are the 2 contributing factors to his snoring, and he might possibly avoid the CPAP with correction the nasal airway and appropriate sleep hygiene. Dental appliance would likely be sufficient to pull the base of tongue forward. The rationale for prolonged antibiotics for sinusitis was discussed including the importance of recovery of the mucociliary transport mechanism to prevent reinfection from retained secretions. The patient will be given a four-week prescription for broad-spectrum antibiotics, will use topical steroid nasal spray, and perform bid sinus irrigations. Then a CT of the paranasal sinuses without contrast will be obtained, and the patient will return for reevaluation. Return in about 1 month (around 7/21/2022). Gita Calderon. Yana Samuels MD    **This report has been created using voice recognition software. It may contain minor errors which are inherent in voicerecognition technology. **

## 2022-07-04 ENCOUNTER — PATIENT MESSAGE (OUTPATIENT)
Dept: FAMILY MEDICINE CLINIC | Age: 27
End: 2022-07-04

## 2022-07-04 DIAGNOSIS — F90.9 ATTENTION DEFICIT HYPERACTIVITY DISORDER (ADHD), UNSPECIFIED ADHD TYPE: ICD-10-CM

## 2022-07-04 RX ORDER — DEXTROAMPHETAMINE SACCHARATE, AMPHETAMINE ASPARTATE MONOHYDRATE, DEXTROAMPHETAMINE SULFATE AND AMPHETAMINE SULFATE 7.5; 7.5; 7.5; 7.5 MG/1; MG/1; MG/1; MG/1
CAPSULE, EXTENDED RELEASE ORAL
Qty: 30 CAPSULE | Refills: 0 | Status: SHIPPED | OUTPATIENT
Start: 2022-07-04 | End: 2022-08-02

## 2022-07-05 NOTE — TELEPHONE ENCOUNTER
From: Wagner Lambert  To: Dr. Gaby Hayward: 2022 6:48 PM EDT  Subject: Medication    I need my Adderall 30mg prescription sent in to [facility] please.

## 2022-07-29 ENCOUNTER — HOSPITAL ENCOUNTER (OUTPATIENT)
Dept: CT IMAGING | Age: 27
Discharge: HOME | End: 2022-07-29
Payer: COMMERCIAL

## 2022-07-29 DIAGNOSIS — G47.30 OBSERVED SLEEP APNEA: ICD-10-CM

## 2022-07-29 DIAGNOSIS — J34.2 NASAL SEPTAL DEVIATION: ICD-10-CM

## 2022-07-29 DIAGNOSIS — R40.0 DAYTIME SOMNOLENCE: ICD-10-CM

## 2022-07-29 DIAGNOSIS — J32.2 CHRONIC ETHMOIDAL SINUSITIS: ICD-10-CM

## 2022-07-29 DIAGNOSIS — J34.3 HYPERTROPHY OF INFERIOR NASAL TURBINATE: ICD-10-CM

## 2022-07-29 PROCEDURE — 70486 CT MAXILLOFACIAL W/O DYE: CPT

## 2022-08-02 ENCOUNTER — PATIENT MESSAGE (OUTPATIENT)
Dept: FAMILY MEDICINE CLINIC | Age: 27
End: 2022-08-02

## 2022-08-02 DIAGNOSIS — F90.9 ATTENTION DEFICIT HYPERACTIVITY DISORDER (ADHD), UNSPECIFIED ADHD TYPE: Primary | ICD-10-CM

## 2022-08-02 NOTE — TELEPHONE ENCOUNTER
From: Rubén Hall  To: Dr. Hartman Given: 8/2/2022 5:26 AM EDT  Subject: Vyvanse     Good Morning Dr. Gareth Russ. Instead of refilling my Adderall XR 30mg this month. May I please switch to the vyvanse 30mg? Thank you.

## 2022-08-16 ENCOUNTER — OFFICE VISIT (OUTPATIENT)
Dept: ENT CLINIC | Age: 27
End: 2022-08-16
Payer: COMMERCIAL

## 2022-08-16 VITALS
OXYGEN SATURATION: 96 % | WEIGHT: 274 LBS | HEART RATE: 96 BPM | RESPIRATION RATE: 18 BRPM | SYSTOLIC BLOOD PRESSURE: 120 MMHG | TEMPERATURE: 97.4 F | DIASTOLIC BLOOD PRESSURE: 74 MMHG | BODY MASS INDEX: 36.15 KG/M2

## 2022-08-16 DIAGNOSIS — J34.2 NASAL SEPTAL DEVIATION: Primary | ICD-10-CM

## 2022-08-16 DIAGNOSIS — G47.30 OBSERVED SLEEP APNEA: ICD-10-CM

## 2022-08-16 DIAGNOSIS — J34.89 ADHESIONS OF NASAL SEPTUM AND TURBINATES: ICD-10-CM

## 2022-08-16 DIAGNOSIS — K04.7 CHRONIC DENTAL INFECTION: ICD-10-CM

## 2022-08-16 DIAGNOSIS — J34.89 CONCHA BULLOSA: ICD-10-CM

## 2022-08-16 DIAGNOSIS — J34.3 HYPERTROPHY OF INFERIOR NASAL TURBINATE: ICD-10-CM

## 2022-08-16 DIAGNOSIS — R51.9 RHINOGENIC HEADACHE: ICD-10-CM

## 2022-08-16 DIAGNOSIS — R40.0 DAYTIME SOMNOLENCE: ICD-10-CM

## 2022-08-16 DIAGNOSIS — J32.0 CHRONIC MAXILLARY SINUSITIS: ICD-10-CM

## 2022-08-16 PROCEDURE — G8417 CALC BMI ABV UP PARAM F/U: HCPCS | Performed by: OTOLARYNGOLOGY

## 2022-08-16 PROCEDURE — 1036F TOBACCO NON-USER: CPT | Performed by: OTOLARYNGOLOGY

## 2022-08-16 PROCEDURE — 99214 OFFICE O/P EST MOD 30 MIN: CPT | Performed by: OTOLARYNGOLOGY

## 2022-08-16 PROCEDURE — G8427 DOCREV CUR MEDS BY ELIG CLIN: HCPCS | Performed by: OTOLARYNGOLOGY

## 2022-08-16 ASSESSMENT — ENCOUNTER SYMPTOMS: RHINORRHEA: 1

## 2022-08-16 NOTE — PROGRESS NOTES
[facility] [place] [facility]  [facility] EAR, NOSE AND THROAT  [facility]  Dept: [phone]  Dept Fax: [phone]  Loc: [phone]    Chely Branham is a 27 y.o. male who was referred byNo ref. provider found for:  Chief Complaint   Patient presents with    Other     Patient is here for sinus workup and CT scan    . HPI:     Chely Branham is a 27 y.o. male who presents today for follow-up on a CT scan after several weeks of vigorous therapy for sinusitis. He has not noticed much improvement in his generalized sinus symptoms. He has facial pressure and chronic stuffy nose. Leticiamarcia Ordaz He has a home sleep study pending. He has ADHD and asthma. CT is reviewed with the patient after reviewing a normal CT of the sinuses. Problems that he has were carefully explained. See simple images below. Narrative   PROCEDURE: CT FACIAL BONES WO CONTRAST       CLINICAL INFORMATION: Nasal septal deviation, Hypertrophy of inferior nasal turbinate, Observed sleep apnea, Daytime somnolence, Chronic ethmoidal sinusitis. COMPARISON: No prior study. TECHNIQUE: A thin section axial CT scan was carried out through the facial bones. Coronal and sagittal reconstructions were performed. All CT scans at this facility use dose modulation, iterative reconstruction, and/or weight-based dosing when appropriate to reduce radiation dose to as low as reasonably achievable. FINDINGS:           Frontal sinuses: Normally aerated and pneumatized. Ethmoid air cells: Minimal mucosal thickening in ethmoid air cells bilaterally. . The lamina papyracea are intact. The cribriform plates and fovea ethmoidalis are relatively symmetric. Sphenoid sinus: Normally aerated and pneumatized. The sphenoethmoidal recesses are patent. Maxillary sinuses:  Moderate mucosal thickening in the left and mild mucosal thickening in the right maxillary sinuses with bilateral air/fluid levels. . There is narrowing of the ostia of both maxillary sinuses, right greater than left. .       Nasal cavity: The nasal septum is slightly deviated towards the right side. There are bilateral robbie bullosae present. . No polyps or masses are noted. The mastoid air cells and middle ear cavities are normally aerated. There is slightly increased density in the nasopharynx. This may represent mild enlargement of the adenoids. Impression       1. Moderate mucosal thickening in the left and mild mucosal thickening in the right maxillary sinuses with bilateral air/fluid levels. Narrowing of the osteophytic both maxillary sinuses. 2. Minimal mucosal thickening in the ethmoid air cells bilaterally. 3. The remaining paranasal sinuses are clear. 4. Nasal septum is slightly deviated towards the right side. 5. There are bilateral robbie bullosae present. 6. There is mild enlargement of the adenoids. **This report has been created using voice recognition software. It may contain minor errors which are inherent in voice recognition technology. **       Final report electronically signed by DR Lacy Saldaña on 7/30/2022 9:06 AM         Review shows the septal deviation of the right conchal bullosae of both middle and both superior turbinates with broad impactions against the septum. .  There is a large air-fluid level left maxillary sinus and some minimal mucosal thickening on the right side of the maxillary sinus. Ethmoid sinuses have scattered mild mucosal thickening. Frontal sinuses are clear. Beneath the mucosal thickening in the left maxillary sinus there is significant irregular bone loss around the roots of a left maxillary molar. History:      Allergies   Allergen Reactions    Erythromycin Rash     Current Outpatient Medications   Medication Sig Dispense Refill    lisdexamfetamine (VYVANSE) 30 MG capsule Take 1 capsule by mouth every morning Comments:        Right Ear: Hearing, tympanic membrane, ear canal and external ear normal. No drainage or swelling. No middle ear effusion. Tympanic membrane is not perforated or erythematous. Left Ear: Hearing, tympanic membrane, ear canal and external ear normal. No drainage or swelling. No middle ear effusion. Tympanic membrane is not perforated or erythematous. Nose: Septal deviation ( Caudal margin is deviated to the left compromising the left nare, he has a an he has a convex septal deviation to the right that is significant. Left side turbinates are enlarged.) present. No mucosal edema or rhinorrhea. Left Turbinates: Enlarged. Mouth/Throat:      Mouth: Mucous membranes are moist. Mucous membranes are not pale and not dry. No oral lesions. Tongue: No lesions. Pharynx: Oropharynx is clear. Uvula midline. No oropharyngeal exudate or posterior oropharyngeal erythema. Comments: LIps: lips normal     Mallampati 2  Base of tongue: symmetric  Mirror exam showed a low larynx but the base of tongue is not enlarged and the velopharyngeal opening is adequate. He does have a significant vertical component of base of tongue that would not be supported. Eyes:      Extraocular Movements: Extraocular movements intact. Comments: Conjugate gaze   Neck:      Thyroid: No thyroid mass or thyromegaly. Trachea: Phonation normal. No tracheal deviation. Comments:     Pulmonary:      Effort: Pulmonary effort is normal. No retractions. Breath sounds: No stridor. Musculoskeletal:      Cervical back: Normal range of motion and neck supple. Lymphadenopathy:      Cervical: No cervical adenopathy. Neurological:      Mental Status: He is alert and oriented to person, place, and time. Cranial Nerves: Cranial nerves are intact. Cranial nerve deficit: VIIth N function intact bilat.    Psychiatric:         Mood and Affect: Mood and affect normal.         Behavior: Behavior is cooperative. Data:  All of the past medical history, past surgical history, family history,social history, allergies and current medications were reviewed with the patient. Assessment & Plan   Diagnoses and all orders for this visit:     Diagnosis Orders   1. Nasal septal deviation  NC REPAIR OF NASAL SEPTUM      2. Hypertrophy of inferior nasal turbinate left  Miscellaneous Surgery      3. Georgie bullosa both middle turbinates  NC NASAL/SINUS SCOPY,RMV GEORGIE BULL      4. Adhesions of nasal septum and turbinates  NC NASAL/SINUS SCOPY,RMV GEORGIE BULL      5. Chronic maxillary sinusitis  NC NASAL SCOPY,OPEN MAXILL SINUS    IGS Endo Sinus Sx      6. Chronic dental infection        7. Observed sleep apnea  NC REPAIR OF NASAL SEPTUM    NC NASAL/SINUS SCOPY,RMV GEORGIE BULL    Miscellaneous Surgery      8. Daytime somnolence  NC REPAIR OF NASAL SEPTUM    NC NASAL/SINUS SCOPY,RMV GEORGIE BULL    Miscellaneous Surgery          The findings were explained and his questions were answered. CT correlated well with his headache symptoms nasal construction and nasal congestion. Given instructions on how the dentist could access the images    Recommended surgical procedures:  Septoplasty  Bilateral maxillary endoscopy and antrostomy  Partial resection conchal bullosae both middle turbinates  Partial resection georgie bullosae of both superior turbinates  Disimpaction of turbinates from the septum with lysis of adhesions  Partial submucosal ablation inferior turbinates  Stereotactic computerized image guidance for sinus surgery  Time estimate 3 hours    Prescription medications to be held  Sudafed starting now  3 g omega-3 fish oil in 1 week before and a week after surgery  Vyvanse per anesthesia guidelines       [facility]. Ananth Beltran MD    **This report has been created using voice recognition software. It may contain minor errors which are inherent in voicerecognition technology. **

## 2022-08-17 ENCOUNTER — TELEPHONE (OUTPATIENT)
Dept: ENT CLINIC | Age: 27
End: 2022-08-17

## 2022-08-17 NOTE — TELEPHONE ENCOUNTER
Patient is calling because he checked with his short term disability insurance and he will not have coverage to be off of work unless he misses at least one week. He was thinking Dr. Nannette Hamman said he could return the Monday after surgery. He is scheduled for surgery currently on 9/28. Would he be ok to return to work on 10/5 or even 10/6 after his 2nd post op appt? He works at [employer]. Please advise.

## 2022-08-18 NOTE — TELEPHONE ENCOUNTER
I spoke with Dr. Irene Kerns and he said waiting to return to work for one week would be appropriate. Lvm for patient to cmb. [employer] requested not to leave results/msgs.

## 2022-09-01 DIAGNOSIS — F90.9 ATTENTION DEFICIT HYPERACTIVITY DISORDER (ADHD), UNSPECIFIED ADHD TYPE: ICD-10-CM

## 2022-09-21 ENCOUNTER — TELEPHONE (OUTPATIENT)
Dept: ENT CLINIC | Age: 27
End: 2022-09-21

## 2022-09-21 ENCOUNTER — PREP FOR PROCEDURE (OUTPATIENT)
Dept: ENT CLINIC | Age: 27
End: 2022-09-21

## 2022-09-21 DIAGNOSIS — J32.0 CHRONIC MAXILLARY SINUSITIS: ICD-10-CM

## 2022-09-21 DIAGNOSIS — J34.89 CONCHA BULLOSA: ICD-10-CM

## 2022-09-21 DIAGNOSIS — J34.3 HYPERTROPHY OF INFERIOR NASAL TURBINATE: ICD-10-CM

## 2022-09-21 DIAGNOSIS — K04.7 CHRONIC DENTAL INFECTION: ICD-10-CM

## 2022-09-21 DIAGNOSIS — G47.30 OBSERVED SLEEP APNEA: ICD-10-CM

## 2022-09-21 DIAGNOSIS — J34.2 NASAL SEPTAL DEVIATION: ICD-10-CM

## 2022-09-21 DIAGNOSIS — R51.9 RHINOGENIC HEADACHE: ICD-10-CM

## 2022-09-21 DIAGNOSIS — J34.89 ADHESIONS OF NASAL SEPTUM AND TURBINATES: Primary | ICD-10-CM

## 2022-09-21 NOTE — TELEPHONE ENCOUNTER
Lvm for patient to cb. Just wanted to inform him that the case was denied stating facility is OON, but it is under appeal because facility and Dr. Duane Mae are IN network. Should have answer by Monday at the latest.  Can see notes in referral under communication as needed.

## 2022-09-21 NOTE — PROGRESS NOTES
PAT Call Date: 9/21   Surgery Date: 9/28    Surgeon: Mone Prieto   Surgery: septoplasty,etc    Is patient from a nursing home? No   Any Isolation Precautions? No   Any Pacemaker or ICD? No If YES, has it been checked recently and where? Has the rep been notified? No     On Snap[employer]?  No     Hard Copy on Chart  In EPIC Pending/Notes   Consent -   Within 30 days; signed, dated & timed by patient and physician     [] On Arrival     [] Blood    Additional Consent Needs:     H&P - Within 30 days    [x] Physician To Do     [] H&P Update - If H&P is older then 24 hours    Clearance -  Medical, Cardiac, Pulmonary, etc.   N/a    Orders - Signed and Dated    Copy Sent to Pharm []    [] Physician To Do    Labs - Within 3 months   N/a  [] CBC    [] BMP   [] GFR   [] INR    [] PTT    [] Urine    [] Liver Enzymes    [] Kidney Function    [] MRSA Nasal   [] MSSA      Others:    Radiology Studies-   Within 1 year      7/30  [] Chest X-Ray   [] MRI    [x] CT    EKG -   Within 1 year, unless hx of HTN  N/a    Cardiac Workup -   Stress Test, Echo, Cath within 18 months    [] Cath                                [] Stress Test                      [] Echo    [] Holter Monitor    [] PARKER

## 2022-09-21 NOTE — PROGRESS NOTES
PAT call attempted, patient unavailable, left message to please call us back at your earliest convenience; [phone]

## 2022-09-22 NOTE — PROGRESS NOTES
Follow all instructions given by your physician    NPO after midnight   Sips of water am of surgery with allowed medications  Bring insurance info and 's license  Wear comfortable clean, loose fitting clothing  No jewelry or contact lenses to be worn day of surgery  No glue on dentures morning of surgery;you will be asked to remove them for surgery. Case for glasses. Shower night before and morning of surgery with a liquid antibacterial soap, dry with fresh clean towel; no lotions, creams or powder. Clean sheets and pillow case on bed night before surgery  Bring medications in original bottles  Bring CPAP/BIPAP machine if you have one ( you may be charged if one is needed in recovery room )   needed at discharge and someone over 18 to stay with you for 24 hours overnight (surgery may be cancelled if you don't have this)  Report to [facility] on 2nd floor  If you would become ill prior to surgery, please call the surgeon  May have a visitor with you, we request that you limit to 2 visitors in pre-op area  Please bring and wear mask  Call [phone] for any questions  Covid questionnaire Complete; Patient negative for symptoms or exposure. See documentation.

## 2022-09-23 NOTE — PROGRESS NOTES
Called Vanessa No chest xray is need at this time with his history of asthma and medication he takes.

## 2022-09-27 PROBLEM — K04.7 CHRONIC DENTAL INFECTION: Status: ACTIVE | Noted: 2022-09-27

## 2022-09-27 PROBLEM — G47.30 OBSERVED SLEEP APNEA: Status: ACTIVE | Noted: 2022-09-27

## 2022-09-27 PROBLEM — J34.89 ADHESIONS OF NASAL SEPTUM AND TURBINATES: Status: ACTIVE | Noted: 2022-09-27

## 2022-09-27 PROBLEM — J34.2 NASAL SEPTAL DEVIATION: Status: ACTIVE | Noted: 2022-09-27

## 2022-09-27 PROBLEM — J34.3 HYPERTROPHY OF INFERIOR NASAL TURBINATE: Status: ACTIVE | Noted: 2022-09-27

## 2022-09-27 PROBLEM — J34.89 CONCHA BULLOSA: Status: ACTIVE | Noted: 2022-09-27

## 2022-09-27 PROBLEM — R51.9 RHINOGENIC HEADACHE: Status: ACTIVE | Noted: 2022-09-27

## 2022-09-27 PROBLEM — J32.0 CHRONIC MAXILLARY SINUSITIS: Status: ACTIVE | Noted: 2022-09-27

## 2022-09-27 RX ORDER — SODIUM CHLORIDE 9 MG/ML
INJECTION, SOLUTION INTRAVENOUS PRN
Status: CANCELLED | OUTPATIENT
Start: 2022-09-27

## 2022-09-27 RX ORDER — SODIUM CHLORIDE 0.9 % (FLUSH) 0.9 %
5-40 SYRINGE (ML) INJECTION EVERY 12 HOURS SCHEDULED
Status: CANCELLED | OUTPATIENT
Start: 2022-09-27

## 2022-09-27 RX ORDER — SODIUM CHLORIDE 0.9 % (FLUSH) 0.9 %
5-40 SYRINGE (ML) INJECTION PRN
Status: CANCELLED | OUTPATIENT
Start: 2022-09-27

## 2022-09-27 RX ORDER — IPRATROPIUM BROMIDE AND ALBUTEROL SULFATE 2.5; .5 MG/3ML; MG/3ML
1 SOLUTION RESPIRATORY (INHALATION) EVERY 4 HOURS PRN
Status: CANCELLED | OUTPATIENT
Start: 2022-09-28

## 2022-09-27 ASSESSMENT — ENCOUNTER SYMPTOMS: RHINORRHEA: 1

## 2022-09-27 NOTE — PROGRESS NOTES
Noted date 9-23-22 at 36 should of read Vanessa with Dr Sherice Gutierrez office not Deborah 53 with Dr Yony Caputo office.

## 2022-09-28 ENCOUNTER — ANESTHESIA (OUTPATIENT)
Dept: OPERATING ROOM | Age: 27
End: 2022-09-28
Payer: COMMERCIAL

## 2022-09-28 ENCOUNTER — ANESTHESIA EVENT (OUTPATIENT)
Dept: OPERATING ROOM | Age: 27
End: 2022-09-28
Payer: COMMERCIAL

## 2022-09-28 ENCOUNTER — HOSPITAL ENCOUNTER (OUTPATIENT)
Age: 27
Setting detail: OUTPATIENT SURGERY
Discharge: HOME | End: 2022-09-28
Attending: OTOLARYNGOLOGY | Admitting: OTOLARYNGOLOGY
Payer: COMMERCIAL

## 2022-09-28 VITALS
OXYGEN SATURATION: 95 % | DIASTOLIC BLOOD PRESSURE: 78 MMHG | BODY MASS INDEX: 34.65 KG/M2 | WEIGHT: 270 LBS | HEIGHT: 74 IN | SYSTOLIC BLOOD PRESSURE: 126 MMHG | TEMPERATURE: 97.1 F | RESPIRATION RATE: 16 BRPM | HEART RATE: 95 BPM

## 2022-09-28 DIAGNOSIS — J32.0 CHRONIC MAXILLARY SINUSITIS: ICD-10-CM

## 2022-09-28 DIAGNOSIS — J34.89 CONCHA BULLOSA: ICD-10-CM

## 2022-09-28 DIAGNOSIS — J34.89 ADHESIONS OF NASAL SEPTUM AND TURBINATES: ICD-10-CM

## 2022-09-28 DIAGNOSIS — G47.30 OBSERVED SLEEP APNEA: ICD-10-CM

## 2022-09-28 DIAGNOSIS — J34.2 NASAL SEPTAL DEVIATION: Primary | ICD-10-CM

## 2022-09-28 DIAGNOSIS — R40.0 DAYTIME SOMNOLENCE: ICD-10-CM

## 2022-09-28 DIAGNOSIS — J34.3 HYPERTROPHY OF NASAL TURBINATES: ICD-10-CM

## 2022-09-28 PROCEDURE — 87075 CULTR BACTERIA EXCEPT BLOOD: CPT

## 2022-09-28 PROCEDURE — 3700000000 HC ANESTHESIA ATTENDED CARE: Performed by: OTOLARYNGOLOGY

## 2022-09-28 PROCEDURE — 6360000002 HC RX W HCPCS: Performed by: OTOLARYNGOLOGY

## 2022-09-28 PROCEDURE — 6360000002 HC RX W HCPCS

## 2022-09-28 PROCEDURE — 2709999900 HC NON-CHARGEABLE SUPPLY: Performed by: OTOLARYNGOLOGY

## 2022-09-28 PROCEDURE — 2500000003 HC RX 250 WO HCPCS

## 2022-09-28 PROCEDURE — 2720000010 HC SURG SUPPLY STERILE: Performed by: OTOLARYNGOLOGY

## 2022-09-28 PROCEDURE — 31240 NSL/SNS NDSC CNCH BULL RESCJ: CPT | Performed by: OTOLARYNGOLOGY

## 2022-09-28 PROCEDURE — 30520 REPAIR OF NASAL SEPTUM: CPT | Performed by: OTOLARYNGOLOGY

## 2022-09-28 PROCEDURE — 3700000001 HC ADD 15 MINUTES (ANESTHESIA): Performed by: OTOLARYNGOLOGY

## 2022-09-28 PROCEDURE — 2500000003 HC RX 250 WO HCPCS: Performed by: OTOLARYNGOLOGY

## 2022-09-28 PROCEDURE — 6370000000 HC RX 637 (ALT 250 FOR IP)

## 2022-09-28 PROCEDURE — 7100000000 HC PACU RECOVERY - FIRST 15 MIN: Performed by: OTOLARYNGOLOGY

## 2022-09-28 PROCEDURE — 7100000001 HC PACU RECOVERY - ADDTL 15 MIN: Performed by: OTOLARYNGOLOGY

## 2022-09-28 PROCEDURE — 6370000000 HC RX 637 (ALT 250 FOR IP): Performed by: OTOLARYNGOLOGY

## 2022-09-28 PROCEDURE — 2500000003 HC RX 250 WO HCPCS: Performed by: STUDENT IN AN ORGANIZED HEALTH CARE EDUCATION/TRAINING PROGRAM

## 2022-09-28 PROCEDURE — 87205 SMEAR GRAM STAIN: CPT

## 2022-09-28 PROCEDURE — 2580000003 HC RX 258: Performed by: OTOLARYNGOLOGY

## 2022-09-28 PROCEDURE — 7100000010 HC PHASE II RECOVERY - FIRST 15 MIN: Performed by: OTOLARYNGOLOGY

## 2022-09-28 PROCEDURE — 30802 ABLATE INF TURBINATE SUBMUC: CPT | Performed by: OTOLARYNGOLOGY

## 2022-09-28 PROCEDURE — 6360000002 HC RX W HCPCS: Performed by: STUDENT IN AN ORGANIZED HEALTH CARE EDUCATION/TRAINING PROGRAM

## 2022-09-28 PROCEDURE — 7100000011 HC PHASE II RECOVERY - ADDTL 15 MIN: Performed by: OTOLARYNGOLOGY

## 2022-09-28 PROCEDURE — 61782 SCAN PROC CRANIAL EXTRA: CPT | Performed by: OTOLARYNGOLOGY

## 2022-09-28 PROCEDURE — 3600000014 HC SURGERY LEVEL 4 ADDTL 15MIN: Performed by: OTOLARYNGOLOGY

## 2022-09-28 PROCEDURE — 31256 EXPLORATION MAXILLARY SINUS: CPT | Performed by: OTOLARYNGOLOGY

## 2022-09-28 PROCEDURE — 3600000004 HC SURGERY LEVEL 4 BASE: Performed by: OTOLARYNGOLOGY

## 2022-09-28 PROCEDURE — 87070 CULTURE OTHR SPECIMN AEROBIC: CPT

## 2022-09-28 RX ORDER — IPRATROPIUM BROMIDE AND ALBUTEROL SULFATE 2.5; .5 MG/3ML; MG/3ML
1 SOLUTION RESPIRATORY (INHALATION) EVERY 4 HOURS PRN
Status: DISCONTINUED | OUTPATIENT
Start: 2022-09-28 | End: 2022-09-28 | Stop reason: HOSPADM

## 2022-09-28 RX ORDER — SODIUM CHLORIDE 0.9 % (FLUSH) 0.9 %
5-40 SYRINGE (ML) INJECTION PRN
Status: DISCONTINUED | OUTPATIENT
Start: 2022-09-28 | End: 2022-09-28 | Stop reason: HOSPADM

## 2022-09-28 RX ORDER — HYDROCODONE BITARTRATE AND ACETAMINOPHEN 7.5; 325 MG/1; MG/1
1 TABLET ORAL ONCE
Status: COMPLETED | OUTPATIENT
Start: 2022-09-28 | End: 2022-09-28

## 2022-09-28 RX ORDER — FENTANYL CITRATE 50 UG/ML
INJECTION, SOLUTION INTRAMUSCULAR; INTRAVENOUS PRN
Status: DISCONTINUED | OUTPATIENT
Start: 2022-09-28 | End: 2022-09-28 | Stop reason: SDUPTHER

## 2022-09-28 RX ORDER — DEXAMETHASONE SODIUM PHOSPHATE 4 MG/ML
INJECTION, SOLUTION INTRA-ARTICULAR; INTRALESIONAL; INTRAMUSCULAR; INTRAVENOUS; SOFT TISSUE PRN
Status: DISCONTINUED | OUTPATIENT
Start: 2022-09-28 | End: 2022-09-28 | Stop reason: ALTCHOICE

## 2022-09-28 RX ORDER — SODIUM CHLORIDE 0.9 % (FLUSH) 0.9 %
5-40 SYRINGE (ML) INJECTION EVERY 12 HOURS SCHEDULED
Status: DISCONTINUED | OUTPATIENT
Start: 2022-09-28 | End: 2022-09-28 | Stop reason: HOSPADM

## 2022-09-28 RX ORDER — PREDNISONE 20 MG/1
20 TABLET ORAL DAILY
Qty: 4 TABLET | Refills: 0 | Status: SHIPPED | OUTPATIENT
Start: 2022-09-28 | End: 2022-10-01

## 2022-09-28 RX ORDER — EPINEPHRINE 1 MG/ML
INJECTION, SOLUTION, CONCENTRATE INTRAVENOUS PRN
Status: DISCONTINUED | OUTPATIENT
Start: 2022-09-28 | End: 2022-09-28 | Stop reason: ALTCHOICE

## 2022-09-28 RX ORDER — FENTANYL CITRATE 50 UG/ML
50 INJECTION, SOLUTION INTRAMUSCULAR; INTRAVENOUS EVERY 5 MIN PRN
Status: DISCONTINUED | OUTPATIENT
Start: 2022-09-28 | End: 2022-09-28 | Stop reason: HOSPADM

## 2022-09-28 RX ORDER — EPHEDRINE SULFATE 50 MG/ML
INJECTION INTRAVENOUS PRN
Status: DISCONTINUED | OUTPATIENT
Start: 2022-09-28 | End: 2022-09-28 | Stop reason: SDUPTHER

## 2022-09-28 RX ORDER — MINERAL OIL AND PETROLATUM 150; 830 MG/G; MG/G
OINTMENT OPHTHALMIC PRN
Status: DISCONTINUED | OUTPATIENT
Start: 2022-09-28 | End: 2022-09-28 | Stop reason: SDUPTHER

## 2022-09-28 RX ORDER — PROPOFOL 10 MG/ML
INJECTION, EMULSION INTRAVENOUS PRN
Status: DISCONTINUED | OUTPATIENT
Start: 2022-09-28 | End: 2022-09-28 | Stop reason: SDUPTHER

## 2022-09-28 RX ORDER — OXYMETAZOLINE HYDROCHLORIDE 0.05 G/100ML
SPRAY NASAL PRN
Status: DISCONTINUED | OUTPATIENT
Start: 2022-09-28 | End: 2022-09-28 | Stop reason: ALTCHOICE

## 2022-09-28 RX ORDER — GLYCOPYRROLATE 1 MG/5 ML
SYRINGE (ML) INTRAVENOUS PRN
Status: DISCONTINUED | OUTPATIENT
Start: 2022-09-28 | End: 2022-09-28 | Stop reason: SDUPTHER

## 2022-09-28 RX ORDER — LIDOCAINE HYDROCHLORIDE 20 MG/ML
INJECTION, SOLUTION EPIDURAL; INFILTRATION; INTRACAUDAL; PERINEURAL PRN
Status: DISCONTINUED | OUTPATIENT
Start: 2022-09-28 | End: 2022-09-28 | Stop reason: SDUPTHER

## 2022-09-28 RX ORDER — ONDANSETRON 2 MG/ML
INJECTION INTRAMUSCULAR; INTRAVENOUS PRN
Status: DISCONTINUED | OUTPATIENT
Start: 2022-09-28 | End: 2022-09-28 | Stop reason: SDUPTHER

## 2022-09-28 RX ORDER — MEPERIDINE HYDROCHLORIDE 25 MG/ML
12.5 INJECTION INTRAMUSCULAR; INTRAVENOUS; SUBCUTANEOUS EVERY 5 MIN PRN
Status: DISCONTINUED | OUTPATIENT
Start: 2022-09-28 | End: 2022-09-28 | Stop reason: HOSPADM

## 2022-09-28 RX ORDER — DEXAMETHASONE SODIUM PHOSPHATE 10 MG/ML
10 INJECTION, EMULSION INTRAMUSCULAR; INTRAVENOUS ONCE
Status: COMPLETED | OUTPATIENT
Start: 2022-09-28 | End: 2022-09-28

## 2022-09-28 RX ORDER — ONDANSETRON 2 MG/ML
4 INJECTION INTRAMUSCULAR; INTRAVENOUS
Status: DISCONTINUED | OUTPATIENT
Start: 2022-09-28 | End: 2022-09-28 | Stop reason: HOSPADM

## 2022-09-28 RX ORDER — MIDAZOLAM HYDROCHLORIDE 1 MG/ML
INJECTION INTRAMUSCULAR; INTRAVENOUS PRN
Status: DISCONTINUED | OUTPATIENT
Start: 2022-09-28 | End: 2022-09-28 | Stop reason: SDUPTHER

## 2022-09-28 RX ORDER — GINSENG 100 MG
CAPSULE ORAL PRN
Status: DISCONTINUED | OUTPATIENT
Start: 2022-09-28 | End: 2022-09-28 | Stop reason: ALTCHOICE

## 2022-09-28 RX ORDER — SUCCINYLCHOLINE CHLORIDE 20 MG/ML
INJECTION INTRAMUSCULAR; INTRAVENOUS PRN
Status: DISCONTINUED | OUTPATIENT
Start: 2022-09-28 | End: 2022-09-28 | Stop reason: SDUPTHER

## 2022-09-28 RX ORDER — ROCURONIUM BROMIDE 10 MG/ML
INJECTION, SOLUTION INTRAVENOUS PRN
Status: DISCONTINUED | OUTPATIENT
Start: 2022-09-28 | End: 2022-09-28 | Stop reason: SDUPTHER

## 2022-09-28 RX ORDER — SODIUM CHLORIDE 9 MG/ML
INJECTION, SOLUTION INTRAVENOUS PRN
Status: DISCONTINUED | OUTPATIENT
Start: 2022-09-28 | End: 2022-09-28 | Stop reason: HOSPADM

## 2022-09-28 RX ORDER — HYDROCODONE BITARTRATE AND ACETAMINOPHEN 7.5; 325 MG/1; MG/1
1 TABLET ORAL EVERY 6 HOURS PRN
Qty: 20 TABLET | Refills: 0 | Status: SHIPPED | OUTPATIENT
Start: 2022-09-28 | End: 2022-10-03

## 2022-09-28 RX ORDER — CLINDAMYCIN PHOSPHATE 900 MG/50ML
900 INJECTION INTRAVENOUS ONCE
Status: COMPLETED | OUTPATIENT
Start: 2022-09-28 | End: 2022-09-28

## 2022-09-28 RX ORDER — HYDROCODONE BITARTRATE AND ACETAMINOPHEN 7.5; 325 MG/1; MG/1
TABLET ORAL
Status: DISCONTINUED
Start: 2022-09-28 | End: 2022-09-28 | Stop reason: HOSPADM

## 2022-09-28 RX ORDER — CLINDAMYCIN HYDROCHLORIDE 300 MG/1
300 CAPSULE ORAL 3 TIMES DAILY
Qty: 42 CAPSULE | Refills: 1 | Status: SHIPPED | OUTPATIENT
Start: 2022-09-28 | End: 2022-10-12

## 2022-09-28 RX ADMIN — ROCURONIUM BROMIDE 20 MG: 10 INJECTION, SOLUTION INTRAVENOUS at 15:19

## 2022-09-28 RX ADMIN — Medication 0.2 MG: at 14:41

## 2022-09-28 RX ADMIN — CLINDAMYCIN IN 5 PERCENT DEXTROSE 900 MG: 18 INJECTION, SOLUTION INTRAVENOUS at 14:11

## 2022-09-28 RX ADMIN — SUGAMMADEX 200 MG: 100 INJECTION, SOLUTION INTRAVENOUS at 18:32

## 2022-09-28 RX ADMIN — ROCURONIUM BROMIDE 50 MG: 10 INJECTION, SOLUTION INTRAVENOUS at 14:22

## 2022-09-28 RX ADMIN — ROCURONIUM BROMIDE 10 MG: 10 INJECTION, SOLUTION INTRAVENOUS at 17:51

## 2022-09-28 RX ADMIN — PROPOFOL 200 MG: 10 INJECTION, EMULSION INTRAVENOUS at 14:07

## 2022-09-28 RX ADMIN — WHITE PETROLATUM 57.7 %-MINERAL OIL 31.9 % EYE OINTMENT 1 EACH: at 14:11

## 2022-09-28 RX ADMIN — FENTANYL CITRATE 50 MCG: 50 INJECTION, SOLUTION INTRAMUSCULAR; INTRAVENOUS at 18:10

## 2022-09-28 RX ADMIN — Medication 0.2 MG: at 14:48

## 2022-09-28 RX ADMIN — ROCURONIUM BROMIDE 30 MG: 10 INJECTION, SOLUTION INTRAVENOUS at 14:49

## 2022-09-28 RX ADMIN — FENTANYL CITRATE 50 MCG: 50 INJECTION, SOLUTION INTRAMUSCULAR; INTRAVENOUS at 18:18

## 2022-09-28 RX ADMIN — PHENYLEPHRINE HYDROCHLORIDE 100 MCG: 10 INJECTION INTRAVENOUS at 15:42

## 2022-09-28 RX ADMIN — CLINDAMYCIN IN 5 PERCENT DEXTROSE 900 MG: 18 INJECTION, SOLUTION INTRAVENOUS at 13:25

## 2022-09-28 RX ADMIN — ROCURONIUM BROMIDE 10 MG: 10 INJECTION, SOLUTION INTRAVENOUS at 16:52

## 2022-09-28 RX ADMIN — EPHEDRINE SULFATE 10 MG: 50 INJECTION, SOLUTION INTRAVENOUS at 16:15

## 2022-09-28 RX ADMIN — ONDANSETRON 4 MG: 2 INJECTION INTRAMUSCULAR; INTRAVENOUS at 18:06

## 2022-09-28 RX ADMIN — LIDOCAINE HYDROCHLORIDE 100 MG: 20 INJECTION, SOLUTION EPIDURAL; INFILTRATION; INTRACAUDAL; PERINEURAL at 14:07

## 2022-09-28 RX ADMIN — SODIUM CHLORIDE: 9 INJECTION, SOLUTION INTRAVENOUS at 15:08

## 2022-09-28 RX ADMIN — MIDAZOLAM 2 MG: 1 INJECTION INTRAMUSCULAR; INTRAVENOUS at 14:03

## 2022-09-28 RX ADMIN — HYDROCODONE BITARTRATE AND ACETAMINOPHEN 1 TABLET: 7.5; 325 TABLET ORAL at 20:08

## 2022-09-28 RX ADMIN — SODIUM CHLORIDE 50 ML/HR: 9 INJECTION, SOLUTION INTRAVENOUS at 11:10

## 2022-09-28 RX ADMIN — ROCURONIUM BROMIDE 20 MG: 10 INJECTION, SOLUTION INTRAVENOUS at 15:51

## 2022-09-28 RX ADMIN — PHENYLEPHRINE HYDROCHLORIDE 100 MCG: 10 INJECTION INTRAVENOUS at 14:48

## 2022-09-28 RX ADMIN — Medication 140 MG: at 14:07

## 2022-09-28 RX ADMIN — PHENYLEPHRINE HYDROCHLORIDE 150 MCG: 10 INJECTION INTRAVENOUS at 14:39

## 2022-09-28 RX ADMIN — DEXAMETHASONE SODIUM PHOSPHATE 10 MG: 10 INJECTION, EMULSION INTRAMUSCULAR; INTRAVENOUS at 13:25

## 2022-09-28 RX ADMIN — FENTANYL CITRATE 100 MCG: 50 INJECTION, SOLUTION INTRAMUSCULAR; INTRAVENOUS at 14:03

## 2022-09-28 ASSESSMENT — PAIN SCALES - GENERAL
PAINLEVEL_OUTOF10: 7
PAINLEVEL_OUTOF10: 0
PAINLEVEL_OUTOF10: 8

## 2022-09-28 NOTE — INTERVAL H&P NOTE
Pt Name: Merle Dickey  MRN: [id number]  YOB: 1995  Date of evaluation: 9/28/2022    I have examined the patient and reviewed the H&P/Consult and there are no changes to the patient or plans.          Electronically signed by Francyne Moritz, MD on 9/28/2022 at 1:21 PM

## 2022-09-28 NOTE — ANESTHESIA PRE PROCEDURE
Department of Anesthesiology  Preprocedure Note       Name:  Naomi Myers   Age:  27 y.o.  :  1995                                          MRN:  [id number]         Date:  2022      Surgeon: William Gregorio):  Bard Brooks MD    Procedure: Procedure(s):  Septoplasty Submucosal Ablation of Left Inferior Turbinate Image Guided Nasal Endoscopy Partial Resection of Earnestine Bullosa Bilateral Middle Turbinates with Lysis of Synechiae Bilateral Maxillary Antrostomy    Medications prior to admission:   Prior to Admission medications    Medication Sig Start Date End Date Taking? Authorizing Provider   Cetirizine HCl (ZYRTEC ALLERGY PO) Take by mouth daily   Yes Historical Provider, MD   lisdexamfetamine (VYVANSE) 30 MG capsule Take 1 capsule by mouth every morning for 30 days.  9/1/22 10/1/22  Arben Draper MD   albuterol sulfate HFA (VENTOLIN HFA) 108 (90 Base) MCG/ACT inhaler Inhale 2 puffs into the lungs every 6 hours as needed for Wheezing 22   Arben Draper MD   [facility]) CAPS Take by mouth    Historical Provider, MD   Multiple Vitamins-Minerals (MENS MULTIVITAMIN) TABS Take by mouth    Historical Provider, MD   Omega-3 Fatty Acids (FISH OIL) 1000 MG CAPS Take 3,000 mg by mouth daily    Historical Provider, MD   5-Hydroxytryptophan (5-HTP MAXIMUM STRENGTH) 200 MG CAPS Take by mouth    Historical Provider, MD       Current medications:    Current Facility-Administered Medications   Medication Dose Route Frequency Provider Last Rate Last Admin    dexamethasone (PF) (DECADRON) injection 10 mg  10 mg IntraVENous Once Bard Brooks MD        clindamycin (CLEOCIN) 900 mg in dextrose 5 % 50 mL IVPB  900 mg IntraVENous Once Bard Brooks MD        ipratropium-albuterol (DUONEB) nebulizer solution 1 ampule  1 ampule Inhalation Q4H PRN Bard Brooks MD        sodium chloride flush 0.9 % injection 5-40 mL  5-40 mL IntraVENous 2 times per day Bard Brooks MD        sodium chloride flush 0.9 % injection 5-40 mL  5-40 mL IntraVENous PRN Melissa Carroll MD        0.9 % sodium chloride infusion   IntraVENous PRN Melissa Carroll MD 50 mL/hr at 09/28/22 1110 50 mL/hr at 09/28/22 1110       Allergies:     Allergies   Allergen Reactions    Tobacco Shortness Of Breath     Runny nose    Erythromycin Rash    Other      Pet Dander watery eyes and runny nose       Problem List:    Patient Active Problem List   Diagnosis Code    ADHD (attention deficit hyperactivity disorder) F90.9    Asthma J45.909    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    Nasal septal deviation J34.2    Hypertrophy of inferior nasal turbinate left J34.3    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    Adhesions of nasal septum and turbinates J34.89    Chronic maxillary sinusitis J32.0    Chronic dental infection K04.7    Observed sleep apnea G47.30    Rhinogenic headache R51.9       Past Medical History:        Diagnosis Date    ADHD (attention deficit hyperactivity disorder)     Asthma     COVID        Past Surgical History:        Procedure Laterality Date    CLAVICLE SURGERY      HAND SURGERY Right     NOSE SURGERY Right     cautery    SHOULDER SURGERY      TONSILLECTOMY AND ADENOIDECTOMY         Social History:    Social History     Tobacco Use    Smoking status: Never    Smokeless tobacco: Never   Substance Use Topics    Alcohol use: Yes     Comment: drinks whiskey at times                                Counseling given: Not Answered      Vital Signs (Current):   Vitals:    09/22/22 1018 09/28/22 1023   BP:  129/82   Pulse:  65   Resp:  16   Temp:  97.5 °F (36.4 °C)   TempSrc:  Temporal   SpO2:  96%   Weight: 270 lb (122.5 kg)    Height: 6' 2\" (1.88 m)                                               BP Readings from Last 3 Encounters:   09/28/22 129/82   08/16/22 120/74   06/21/22 122/72       NPO Status: Time of last liquid consumption: 0800                        Time of last solid consumption: 0200 Date of last liquid consumption: 09/28/22                        Date of last solid food consumption: 09/28/22    BMI:   Wt Readings from Last 3 Encounters:   09/22/22 270 lb (122.5 kg)   08/16/22 274 lb (124.3 kg)   06/21/22 277 lb 1.6 oz (125.7 kg)     Body mass index is 34.67 kg/m². CBC:   Lab Results   Component Value Date/Time    WBC 7.9 11/11/2021 04:07 PM    RBC 5.31 11/11/2021 04:07 PM    HGB 15.5 11/11/2021 04:07 PM    HCT 47.0 11/11/2021 04:07 PM    MCV 88.5 11/11/2021 04:07 PM    RDW 11.9 11/29/2017 04:50 PM     11/11/2021 04:07 PM       CMP:   Lab Results   Component Value Date/Time     11/11/2021 04:07 PM    K 4.4 11/11/2021 04:07 PM     11/11/2021 04:07 PM    CO2 22 11/11/2021 04:07 PM    BUN 22 11/11/2021 04:07 PM    CREATININE 1.0 11/11/2021 04:07 PM    LABGLOM 89 11/11/2021 04:07 PM    GLUCOSE 91 11/11/2021 04:07 PM    PROT 7.3 11/11/2021 04:07 PM    CALCIUM 9.3 11/11/2021 04:07 PM    BILITOT 0.6 11/11/2021 04:07 PM    ALKPHOS 47 11/11/2021 04:07 PM    AST 47 11/11/2021 04:07 PM    ALT 50 11/11/2021 04:07 PM       POC Tests: No results for input(s): POCGLU, POCNA, POCK, POCCL, POCBUN, POCHEMO, POCHCT in the last 72 hours.     Coags: No results found for: PROTIME, INR, APTT    HCG (If Applicable): No results found for: PREGTESTUR, PREGSERUM, HCG, HCGQUANT     ABGs: No results found for: PHART, PO2ART, PQM9KJT, RRT8JRV, BEART, Q5YKBYOJ     Type & Screen (If Applicable):  No results found for: LABABO, LABRH    Drug/Infectious Status (If Applicable):  Lab Results   Component Value Date/Time    HEPCAB Negative 11/11/2021 04:07 PM       COVID-19 Screening (If Applicable): No results found for: COVID19        Anesthesia Evaluation  Patient summary reviewed and Nursing notes reviewed no history of anesthetic complications:   Airway: Mallampati: II  TM distance: >3 FB   Neck ROM: full  Mouth opening: > = 3 FB   Dental:          Pulmonary:normal exam  breath sounds clear to auscultation  (+) sleep apnea:  asthma:                            Cardiovascular:Negative CV ROS  Exercise tolerance: good (>4 METS),                     Neuro/Psych:   (+) headaches:, psychiatric history:            GI/Hepatic/Renal:            ROS comment: obesity. Endo/Other: Negative Endo/Other ROS             Pt had no PAT visit       Abdominal:   (+) obese,     Abdomen: soft. Vascular: negative vascular ROS. Other Findings:           Anesthesia Plan      general     ASA 3       Induction: intravenous. MIPS: Postoperative opioids intended and Prophylactic antiemetics administered. Anesthetic plan and risks discussed with patient and spouse. Plan discussed with CRNA.                     Priya Salazar DO   9/28/2022

## 2022-09-28 NOTE — DISCHARGE INSTRUCTIONS
NASAL SURGERY   POST-OP INSTRUCTION SHEET    1. Keep your scheduled post-operative appointment. 2. Do not blow your nose for 3 days after surgery. You may gently sniff   to clear drainage. No snorting. 3. Expect moderate amounts of bloody discharge for a few hours. Change your dressing   as needed if it becomes soiled. Place it on your upper lip and do not block the nostrils. If you are not having discharge you do not have to wear a dressing. 4. Activity should be gradually increased, but you should not lift over   10-15 lbs., or exercise more strenuously than fast walking for 3 days  following surgery. Straining to stabilize your core is the issue, not how much your arm is holding. You may return to work, with these guidelines in mind, as soon as you feel well enough, and are off narcotic pain medicine, unless work is in a carlota/dirty environment. Please follow additional post-op instructions provided by nursing staff upon leaving hospital.  If you have any questions or problems, please contact our office ar [phone]       What symptoms are normal?    Soreness in front of nose and/or upper teeth  Clear/bloody drainage during first 3 days is not unusual  Facial pains/headaches  Sutures inside of nose will either dissolve away or fall off-- May take up to 6 wks after surgery  Nasal stuffiness improves gradually over weeks. Medicines: You will get 3 prescriptions sent directly to your pharmacy     Use Afrin nasal spray, 8 drops each nostril on back with head hanging off edge of bed, stay five minutes, at least every 8 hours and at bedtime for five days. . Today at 10 PM, 6 AM, 12 NOON      Brand-name original Gearldine Tomasz is more comfortable. You may use that instead of the generic oxymetazoline from the hospital.    Apply Bacitracin ointment with a Q tip, inside about a half inch all around. Three times a day for 2 weeks. .then every night as needed for 4 weeks    Saline Sinus Rinse:   Twice a Day beginning three days after surgery. Use NeilMed bottle and packets and use only distilled water. If distilled water is not available, boiled tap water is acceptable, but let it cool. May use this more often if desired, as it  helps congestion and pressure, especially if you use two packets per bottle to make it hypertonic. Dos and Donts    DO cough or sneeze with your mouth open  DO sniff in to clear secretions  DO sleep on a recliner if possible for the first 1-2days  DO use the sinus rinse twice a day in each nostril after three days  DO NOT use fingernails or nozzle of ointment tube in nose  DO NOT pick at crusts just inside the nostril if you have a septoplasty. There are sutures there. May clean gently with peroxide on a Q-tip to remove bloody crusts from the nostrils  DO NOT take aspirin products (aspirin, aleve, ibuprofen, motrin, etc for (2) two weeks following surgery or any of the other platelet inhibitors on the list provided to you. Includes garlic in any form. DO NOT blow your nose for 3 days following surgery. DO NOT try to \"stifle\" a sneeze by holding your nose, rather open mouth and let it out, even holler.   DO NOT participate in strenuous activity for at least 3 days following surgery, seven if a nasal splint is applied to dorsum

## 2022-09-28 NOTE — BRIEF OP NOTE
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]      Post-Op Diagnosis: Same       Procedure(s):  Septoplasty Submucosal Ablation of Left Inferior Turbinate Image Guided Nasal Endoscopy Partial Resection of Earnestine Bullosae  of Bilateral Middle Turbinates and superior turbinates (4) Bilateral Maxillary Antrostomy    Surgeon(s):  Alexandria Choudhary MD    Assistant:  * No surgical staff found *    Anesthesia: General    Estimated Blood Loss (mL): 569     Complications: None    Specimens:   ID Type Source Tests Collected by Time Destination   1 : Aspirate Left Maxillary Sinus Body Fluid Sinus CULTURE, ANAEROBIC AND AEROBIC Alexandria Choudhary MD 9/28/2022 1542    2 : Left Maxillary Sinus  Swab Sinus CULTURE, ANAEROBIC AND AEROBIC Alexandria Choudhary MD 9/28/2022 1600    A : Right Maxillary Sinus Swab Sinus SURGICAL PATHOLOGY, CULTURE, ANAEROBIC AND AEROBIC Alexandria Choudhary MD 9/28/2022 1643        Implants:  * No implants in log *      Drains: * No LDAs found *    Findings: Right septal deviation hypertrophy left inferior turbinate significant and obstructing conchal bullosae of both middle and both superior turbinates. Superior turbinate contacting the septum but there was there were no adhesions. There was pus that was cultured from both maxillary antra. He had an accessory ostium on the medial wall left maxillary sinus. This was not on the final common pathway and the tumor not connected. Significant number of photos were taken to document the various procedures performed and the results. Patient has a has a large tongue and I suspect he will still be snoring to some degree.   I plan to repeat strongly recommend a sleep study to be performed in a couple of months when his airway is at baseline.     Electronically signed by Blanca Cool MD on 9/28/2022 at 6:49 PM

## 2022-09-28 NOTE — H&P
[facility] [place] [facility]  [facility] EAR, NOSE AND THROAT  [facility]  Dept: [phone]  Dept Fax: [phone]  Loc: [phone]    Inder Alvarez is a 27 y.o. male who was referred byNo ref. provider found for:  Chief Complaint   Patient presents with    Other     Patient is here for sinus workup and CT scan    . HPI:     Inder Alvarez is a 27 y.o. male who presents today for follow-up on a CT scan after several weeks of vigorous therapy for sinusitis. He has not noticed much improvement in his generalized sinus symptoms. He has facial pressure and chronic stuffy nose. Tanda Bun He has a home sleep study pending. He has ADHD and asthma. CT is reviewed with the patient after reviewing a normal CT of the sinuses. Problems that he has were carefully explained. See simple images below. Narrative   PROCEDURE: CT FACIAL BONES WO CONTRAST       CLINICAL INFORMATION: Nasal septal deviation, Hypertrophy of inferior nasal turbinate, Observed sleep apnea, Daytime somnolence, Chronic ethmoidal sinusitis. COMPARISON: No prior study. TECHNIQUE: A thin section axial CT scan was carried out through the facial bones. Coronal and sagittal reconstructions were performed. All CT scans at this facility use dose modulation, iterative reconstruction, and/or weight-based dosing when appropriate to reduce radiation dose to as low as reasonably achievable. FINDINGS:           Frontal sinuses: Normally aerated and pneumatized. Ethmoid air cells: Minimal mucosal thickening in ethmoid air cells bilaterally. . The lamina papyracea are intact. The cribriform plates and fovea ethmoidalis are relatively symmetric. Sphenoid sinus: Normally aerated and pneumatized. The sphenoethmoidal recesses are patent. Maxillary sinuses:  Moderate mucosal thickening in the left and mild mucosal thickening in the right maxillary sinuses with bilateral air/fluid levels. . There is narrowing of the ostia of both maxillary sinuses, right greater than left. .       Nasal cavity: The nasal septum is slightly deviated towards the right side. There are bilateral robbie bullosae present. . No polyps or masses are noted. The mastoid air cells and middle ear cavities are normally aerated. There is slightly increased density in the nasopharynx. This may represent mild enlargement of the adenoids. Impression       1. Moderate mucosal thickening in the left and mild mucosal thickening in the right maxillary sinuses with bilateral air/fluid levels. Narrowing of the osteophytic both maxillary sinuses. 2. Minimal mucosal thickening in the ethmoid air cells bilaterally. 3. The remaining paranasal sinuses are clear. 4. Nasal septum is slightly deviated towards the right side. 5. There are bilateral robbie bullosae present. 6. There is mild enlargement of the adenoids. **This report has been created using voice recognition software. It may contain minor errors which are inherent in voice recognition technology. **       Final report electronically signed by DR Malika Rodriguez on 7/30/2022 9:06 AM         Review shows the septal deviation of the right conchal bullosae of both middle and both superior turbinates with broad impactions against the septum. .  There is a large air-fluid level left maxillary sinus and some minimal mucosal thickening on the right side of the maxillary sinus. Ethmoid sinuses have scattered mild mucosal thickening. Frontal sinuses are clear. Beneath the mucosal thickening in the left maxillary sinus there is significant irregular bone loss around the roots of a left maxillary molar. History:      Allergies   Allergen Reactions    Erythromycin Rash     Current Outpatient Medications   Medication Sig Dispense Refill    lisdexamfetamine (VYVANSE) 30 MG capsule Take 1 capsule by mouth every morning for 30 days. 30 capsule 0    Fexofenadine-Pseudoephedrine (ALLEGRA-D 24 HOUR PO) Take by mouth      albuterol sulfate HFA (VENTOLIN HFA) 108 (90 Base) MCG/ACT inhaler Inhale 2 puffs into the lungs every 6 hours as needed for Wheezing 18 g 2    Misc Natural Products (JOINT HEALTH) CAPS Take by mouth      Multiple Vitamins-Minerals (MENS MULTIVITAMIN) TABS Take by mouth      Omega-3 Fatty Acids (FISH OIL) 1000 MG CAPS Take 3,000 mg by mouth daily      5-Hydroxytryptophan (5-HTP MAXIMUM STRENGTH) 200 MG CAPS Take by mouth       No current facility-administered medications for this visit. Past Medical History:   Diagnosis Date    ADHD (attention deficit hyperactivity disorder)     Asthma       Past Surgical History:   Procedure Laterality Date    CLAVICLE SURGERY      HAND SURGERY Right     SHOULDER SURGERY      TONSILLECTOMY AND ADENOIDECTOMY       Family History   Problem Relation Age of Onset    Diabetes Mother     Heart Disease Mother     High Blood Pressure Mother     Hearing Loss Mother     Cataracts Maternal Grandmother     Blindness Maternal Grandmother     Diabetes Maternal Grandfather     High Cholesterol Maternal Grandfather     Cancer Paternal Grandfather      Social History     Tobacco Use    Smoking status: Never    Smokeless tobacco: Never   Substance Use Topics    Alcohol use: Yes     Comment: occasional       Subjective:      Review of Systems   HENT:  Positive for nosebleeds, rhinorrhea and sneezing. Objective:   /74 (Site: Left Upper Arm, Position: Sitting)   Pulse 96   Temp 97.4 °F (36.3 °C) (Infrared)   Resp 18   Wt 274 lb (124.3 kg)   SpO2 96%   BMI 36.15 kg/m²     Physical Exam  Vitals and nursing note reviewed. Constitutional:       Appearance: He is well-developed. HENT:      Head: Normocephalic and atraumatic. No laceration. Salivary Glands: Right salivary gland is not diffusely enlarged or tender. Left salivary gland is not diffusely enlarged or tender. Comments:        Right Ear: Hearing, tympanic membrane, ear canal and external ear normal. No drainage or swelling. No middle ear effusion. Tympanic membrane is not perforated or erythematous. Left Ear: Hearing, tympanic membrane, ear canal and external ear normal. No drainage or swelling. No middle ear effusion. Tympanic membrane is not perforated or erythematous. Nose: Septal deviation ( Caudal margin is deviated to the left compromising the left nare, he has a an he has a convex septal deviation to the right that is significant. Left side turbinates are enlarged.) present. No mucosal edema or rhinorrhea. Left Turbinates: Enlarged. Mouth/Throat:      Mouth: Mucous membranes are moist. Mucous membranes are not pale and not dry. No oral lesions. Tongue: No lesions. Pharynx: Oropharynx is clear. Uvula midline. No oropharyngeal exudate or posterior oropharyngeal erythema. Comments: LIps: lips normal     Mallampati 2  Base of tongue: symmetric  Mirror exam showed a low larynx but the base of tongue is not enlarged and the velopharyngeal opening is adequate. He does have a significant vertical component of base of tongue that would not be supported. Eyes:      Extraocular Movements: Extraocular movements intact. Comments: Conjugate gaze   Neck:      Thyroid: No thyroid mass or thyromegaly. Trachea: Phonation normal. No tracheal deviation. Comments:     Pulmonary:      Effort: Pulmonary effort is normal. No retractions. Breath sounds: No stridor. Musculoskeletal:      Cervical back: Normal range of motion and neck supple. Lymphadenopathy:      Cervical: No cervical adenopathy. Neurological:      Mental Status: He is alert and oriented to person, place, and time. Cranial Nerves: Cranial nerve deficit: VIIth N function intact bilat. Psychiatric:         Mood and Affect: Mood and affect normal.         Behavior: Behavior is cooperative. Data:   All of the past medical history, past surgical history, family history,social history, allergies and current medications were reviewed with the patient. Assessment & Plan   Diagnoses and all orders for this visit:     Diagnosis Orders   1. Nasal septal deviation  NC REPAIR OF NASAL SEPTUM      2. Hypertrophy of inferior nasal turbinate left  Miscellaneous Surgery      3. Georgie bullosa both middle turbinates  NC NASAL/SINUS SCOPY,RMV GEORGIE BULL      4. Adhesions of nasal septum and turbinates  NC NASAL/SINUS SCOPY,RMV GEORGIE BULL      5. Chronic maxillary sinusitis  NC NASAL SCOPY,OPEN MAXILL SINUS    IGS Endo Sinus Sx      6. Chronic dental infection        7. Observed sleep apnea  NC REPAIR OF NASAL SEPTUM    NC NASAL/SINUS SCOPY,RMV GEORGIE BULL    Miscellaneous Surgery      8. Daytime somnolence  NC REPAIR OF NASAL SEPTUM    NC NASAL/SINUS SCOPY,RMV GEORGIE BULL    Miscellaneous Surgery          The findings were explained and his questions were answered. CT correlated well with his headache symptoms nasal construction and nasal congestion. Given instructions on how the dentist could access the images    Recommended surgical procedures:  Septoplasty  Bilateral maxillary endoscopy and antrostomy  Partial resection conchal bullosae both middle turbinates  Partial resection georgie bullosae of both superior turbinates  Disimpaction of turbinates from the septum with lysis of adhesions  Partial submucosal ablation inferior turbinates  Stereotactic computerized image guidance for sinus surgery  Time estimate 3 hours    Prescription medications to be held  Sudafed starting now  3 g omega-3 fish oil in 1 week before and a week after surgery  Vyvanse per anesthesia guidelines       Savanna Pelaez. Adama Christian MD    **This report has been created using voice recognition software. It may contain minor errors which are inherent in voicerecognition technology. **

## 2022-09-28 NOTE — PROGRESS NOTES
1842- pt to pacu, resp easy and unlabored, VSS, pt placed on humidified oxygen, pt denies pain, pt appears in no acute distress  1850- pt resting in bed with eyes closed, pt denies pain, resp easy and unlabored, VSS, pt appears in no acute distress  1900- pt remains in stable condition, pt denies pain states his nose feels swollen, resp easy and unlabored, VSS, pt appears in no acute distress  1912- pt meets criteria for discharge from pacu, pt transported to [facility], report given to Ticies

## 2022-09-29 ENCOUNTER — OFFICE VISIT (OUTPATIENT)
Dept: ENT CLINIC | Age: 27
End: 2022-09-29

## 2022-09-29 VITALS
WEIGHT: 273.9 LBS | HEART RATE: 105 BPM | OXYGEN SATURATION: 95 % | HEIGHT: 72 IN | DIASTOLIC BLOOD PRESSURE: 84 MMHG | RESPIRATION RATE: 18 BRPM | TEMPERATURE: 98.4 F | SYSTOLIC BLOOD PRESSURE: 128 MMHG | BODY MASS INDEX: 37.1 KG/M2

## 2022-09-29 DIAGNOSIS — J34.3 HYPERTROPHY OF INFERIOR NASAL TURBINATE: ICD-10-CM

## 2022-09-29 DIAGNOSIS — R51.9 RHINOGENIC HEADACHE: ICD-10-CM

## 2022-09-29 DIAGNOSIS — J32.0 CHRONIC MAXILLARY SINUSITIS: ICD-10-CM

## 2022-09-29 DIAGNOSIS — J34.89 ADHESIONS OF NASAL SEPTUM AND TURBINATES: ICD-10-CM

## 2022-09-29 DIAGNOSIS — R40.0 DAYTIME SOMNOLENCE: ICD-10-CM

## 2022-09-29 DIAGNOSIS — J34.89 CONCHA BULLOSA: ICD-10-CM

## 2022-09-29 DIAGNOSIS — K04.7 CHRONIC DENTAL INFECTION: ICD-10-CM

## 2022-09-29 DIAGNOSIS — J34.2 NASAL SEPTAL DEVIATION: Primary | ICD-10-CM

## 2022-09-29 DIAGNOSIS — G47.30 OBSERVED SLEEP APNEA: ICD-10-CM

## 2022-09-29 DIAGNOSIS — J32.2 CHRONIC ETHMOIDAL SINUSITIS: ICD-10-CM

## 2022-09-29 PROCEDURE — 99024 POSTOP FOLLOW-UP VISIT: CPT | Performed by: OTOLARYNGOLOGY

## 2022-09-29 ASSESSMENT — ENCOUNTER SYMPTOMS
SINUS PRESSURE: 0
SORE THROAT: 0
RHINORRHEA: 0
NAUSEA: 0
CHOKING: 0
DIARRHEA: 0
SHORTNESS OF BREATH: 0
VOMITING: 0
VOICE CHANGE: 0
TROUBLE SWALLOWING: 0
COLOR CHANGE: 0
STRIDOR: 0
ABDOMINAL PAIN: 0
CHEST TIGHTNESS: 0
FACIAL SWELLING: 0
APNEA: 0
WHEEZING: 0
COUGH: 0

## 2022-09-29 NOTE — PROGRESS NOTES
[facility] [place] [facility]  [facility] EAR, NOSE AND THROAT  [facility] - [place], [place]  Dept: [phone]  Dept Fax: [phone]  Loc: [phone]    Kylie Contreras is a 27 y.o. male who was referred byNo ref. provider found for:  Chief Complaint   Patient presents with    Post-Op Check     Patient is here for post op 9/28 septo, L itr, igs w B r/s robbie middle turbs, lysis of synechiae, B maxillary    . HPI:     Kylie Contreras is a 27 y.o. male who presents today for post op 09/28/2022     PROCEDURES:  Septoplasty; partial submucosal ablation, left inferior  turbinate; partial resection, robbie bullosae of bilateral middle  turbinates; partial resection, robbie bullosae of bilateral superior  turbinates; bilateral maxillary antrostomy; stereotactic computerized  image guidance for sinus surgery. He's doing well. His work won't cover him unless he has 7 days off in a row. He is ok to return to go back to work after his 1 week follow-up visit, 9 days after surgery. History: Allergies   Allergen Reactions    Tobacco Shortness Of Breath     Runny nose    Erythromycin Rash    Other      Pet Dander watery eyes and runny nose     Current Outpatient Medications   Medication Sig Dispense Refill    HYDROcodone-acetaminophen (NORCO) 7.5-325 MG per tablet Take 1 tablet by mouth every 6 hours as needed for Pain for up to 5 days. 20 tablet 0    predniSONE (DELTASONE) 20 MG tablet Take 1 tablet by mouth daily for 3 days Then one-half tablet for two days. Start the day AFTER surgery 4 tablet 0    clindamycin (CLEOCIN) 300 MG capsule Take 1 capsule by mouth 3 times daily for 14 days Begin evening of surgery. Stop and call for excessive diarrhea/abdominal cramping. 42 capsule 1    Cetirizine HCl (ZYRTEC ALLERGY PO) Take by mouth daily      lisdexamfetamine (VYVANSE) 30 MG capsule Take 1 capsule by mouth every morning for 30 days.  30 capsule 0    albuterol sulfate HFA (VENTOLIN HFA) 108 (90 Base) MCG/ACT inhaler Inhale 2 puffs into the lungs every 6 hours as needed for Wheezing 18 g 2    Misc Natural Products (JOINT HEALTH) CAPS Take by mouth      Multiple Vitamins-Minerals (MENS MULTIVITAMIN) TABS Take by mouth      Omega-3 Fatty Acids (FISH OIL) 1000 MG CAPS Take 3,000 mg by mouth daily      5-Hydroxytryptophan (5-HTP MAXIMUM STRENGTH) 200 MG CAPS Take by mouth       No current facility-administered medications for this visit. Past Medical History:   Diagnosis Date    ADHD (attention deficit hyperactivity disorder)     Asthma     COVID       Past Surgical History:   Procedure Laterality Date    CLAVICLE SURGERY      HAND SURGERY Right     NOSE SURGERY Right     cautery    SHOULDER SURGERY      TONSILLECTOMY AND ADENOIDECTOMY       Family History   Problem Relation Age of Onset    Diabetes Mother     Heart Disease Mother     High Blood Pressure Mother     Hearing Loss Mother     Cataracts Maternal Grandmother     Blindness Maternal Grandmother     Diabetes Maternal Grandfather     High Cholesterol Maternal Grandfather     Cancer Paternal Grandfather      Social History     Tobacco Use    Smoking status: Never    Smokeless tobacco: Never   Substance Use Topics    Alcohol use: Yes     Comment: drinks whiskey at times       Subjective:       Review of Systems   Constitutional:  Negative for activity change, appetite change, chills, diaphoresis, fatigue, fever and unexpected weight change. HENT:  Negative for congestion, dental problem, ear discharge, ear pain, facial swelling, hearing loss, mouth sores, nosebleeds, postnasal drip, rhinorrhea, sinus pressure, sneezing, sore throat, tinnitus, trouble swallowing and voice change. Eyes:  Negative for visual disturbance. Respiratory:  Negative for apnea, cough, choking, chest tightness, shortness of breath, wheezing and stridor. Cardiovascular:  Negative for chest pain, palpitations and leg swelling.    Gastrointestinal: Negative for abdominal pain, diarrhea, nausea and vomiting. Endocrine: Negative for cold intolerance, heat intolerance, polydipsia and polyuria. Genitourinary:  Negative for dysuria, enuresis and hematuria. Musculoskeletal:  Negative for arthralgias, gait problem, neck pain and neck stiffness. Skin:  Negative for color change and rash. Allergic/Immunologic: Negative for environmental allergies, food allergies and immunocompromised state. Neurological:  Negative for dizziness, syncope, facial asymmetry, speech difficulty, light-headedness and headaches. Hematological:  Negative for adenopathy. Does not bruise/bleed easily. Psychiatric/Behavioral:  Negative for confusion and sleep disturbance. The patient is not nervous/anxious. Objective:     /84 (Site: Left Upper Arm, Position: Sitting)   Pulse (!) 105   Temp 98.4 °F (36.9 °C) (Infrared)   Resp 18   Ht 6' (1.829 m)   Wt 273 lb 14.4 oz (124.2 kg)   SpO2 95%   BMI 37.15 kg/m²     Physical Exam    Nose: Nose is decongested and anesthetized with topical sprays. Nasal fossa is suctioned bilaterally. Clots are removed. Normal postoperative appearance. Data:  All of the past medical history, past surgical history, family history,social history, allergies and current medications were reviewed with the patient. Assessment & Plan   Diagnoses and all orders for this visit:     Diagnosis Orders   1. Nasal septal deviation        2. Hypertrophy of inferior nasal turbinate left        3. Earnestine bullosa both middle turbinates        4. Adhesions of nasal septum and turbinates        5. Chronic maxillary sinusitis        6. Rhinogenic headache        7. Observed sleep apnea        8. Chronic dental infection        9. Daytime somnolence        10. Chronic ethmoidal sinusitis            The findings were explained and his questions were answered.   Options were discussed including     Follow the instructions from yesterday's AVS form regarding continuing the Afrin, and starting the saline irrigations in 3 days. Use NeilMed bottle, their packets, and only distilled water at least twice a day. Flush up one nostril and out the other, leaning forward over sink. Stop the Afrin after 4 more days. Return in a Week. He agreed. I, Juni Breaux CMA ([facility]), am scribing for, and in the presence of Dr. Caterina Rascon. Electronically signed by David Eastman CMA ([facility]) on 9/29/22 at 9:27 AM EDT. (Please note that portions of this note were completed with a voice recognition program. Efforts were made to edit the dictations butoccasionally words are mis-transcribed.)    I agree to the above documentation placed by my scribe. I have personally evaluated this patient. Additional findings are as noted. I reviewed the scribe's note and agree with the documented findings and plan of care. Any areas of disagreement are corrected. I agree with the chief complaint, past medical history, past surgical history, allergies, medications, social and family history as documented unless otherwise noted below.      Electronically signed by Divya Maier MD on 10/30/2022 at 12:09 AM

## 2022-09-29 NOTE — OP NOTE
[address] [place] [address]                                OPERATIVE REPORT    PATIENT NAME: Annita SANON                     :        1995  MED REC NO:   [id number]                           ROOM:  ACCOUNT NO:   [de-identified]                           ADMIT DATE: 2022  PROVIDER:     Lola Simms. Berta Mena M.D.    Kevin Mahan:  2022    PROCEDURES:  Septoplasty; partial submucosal ablation, left inferior  turbinate; partial resection, robbie bullosae of bilateral middle  turbinates; partial resection, robbie bullosae of bilateral superior  turbinates; bilateral maxillary antrostomy; stereotactic computerized  image guidance for sinus surgery. SURGEON:  More Huerta MD    ANESTHESIA:  General endotracheal.    PREOPERATIVE DIAGNOSES:  Nasal septal deviation with observed sleep  apnea, daytime somnolence, hypertrophy of left inferior nasal turbinate,  robbie bullosae of both middle and both superior turbinates, chronic  maxillary sinusitis. POSTOPERATIVE DIAGNOSES:  Nasal septal deviation with observed sleep  apnea, daytime somnolence, hypertrophy of left inferior nasal turbinate,  robbie bullosae of both middle and both superior turbinates, chronic  maxillary sinusitis. HISTORY AND OPERATIVE FINDINGS:  This 20-year-old male has a history of  chronic difficulty with sleeping. He snores terribly and has observed  to stop breathing. He had a very narrow nasal fossa with an extremely  tight airway. Septum deviated to the right. Left inferior turbinate  was hypertrophied and the robbie bullosae mentioned were significantly  obstructing the airway more posteriorly. He had actual pus coming from  the natural ostia of both maxillary sinuses, more so on the left.    Preoperative CT scan showed a significant effusion on the left and maybe  some dental issues around the roots of some maxillary teeth on the left.    Even though his nasal passage was corrected, it appeared that he had  significant size tongue with some hypopharyngeal obstruction and  probably will still be snoring and needs to consider CPAP. At least with his nose opened, he will be able to tolerate it. He was a  chronic mouth breather otherwise. Estimated blood loss was 200 mL. OPERATIVE PROCEDURE:  After adequate level of general endotracheal  anesthesia had been obtained, the patient's face was prepped and draped  in aseptic fashion. The nose was decongested, vasoconstricted, and  anesthetized in the usual manner for sinus surgery. Stereotactic  registration was achieved accurate to 1.2 mm in the entire operative  field. Partial submucosal ablation of the left inferior turbinate using the  turbinator was performed first through an anterior entry point and  treating the entire medial surface. This turbinate was then fractured  laterally and cottonoids were packed against it. Robbie bullosa, left middle turbinate, was then partially resected along  its lateral edge with microdebrider. This was a meticulous dissection  and there was photographic documentation. The internal mucosa was  carefully maintained. Superior meatus was checked for patency, this was obstructed. The  lateral aspect of that robbie bullosa was taken down with microdebrider  and forceps as needed. This was  from the septum. Cottonoids  impregnated with Afrin were placed on that side. The septum was bowed enough to the right that I could not get between  the buttress and the septal deviation to work on the right side robbie  bullosae. I therefore performed the septoplasty next. The main  indication for the septoplasty remains the airway obstruction. Right hemitransfixion incision was created and caudal margin of the  quadrangular cartilage was dissected out. It was heavily scarred and  the cartilage was fractured.   Left anterior tunnel was postoperative pain relief and  vasoconstriction. Stomach was suctioned of large amount of clear fluid. Attention was turned to the nasal fossa. Bleeding seemed to be well  controlled. Nose was cleansed. Postoperative photo was taken and the  patient was awakened, extubated, and taken to the recovery room in  satisfactory condition. He tolerated the procedure well. There were no  complications.         Reece Clark M.D.    D: 09/28/2022 19:23:39       T: 09/28/2022 19:29:01     [email or web address]  Job#: [id number]     Doc#: [id number]    CC:  Ebony Manning M.D.

## 2022-09-29 NOTE — PROGRESS NOTES
Pt has met discharge criteria and states he is ready for discharge to home. IV removed, gauze and tape applied. Dressed in own clothes and personal belongings gathered. Discharge instructions (with opioid medication education information) given to pt and family; pt and family verbalized understanding of discharge instructions, prescriptions and follow up appointments. Pt transported to discharge lobby by [facility] staff.

## 2022-10-01 DIAGNOSIS — F90.9 ATTENTION DEFICIT HYPERACTIVITY DISORDER (ADHD), UNSPECIFIED ADHD TYPE: ICD-10-CM

## 2022-10-03 LAB
AEROBIC CULTURE: NORMAL
ANAEROBIC CULTURE: NORMAL
GRAM STAIN RESULT: NORMAL

## 2022-10-04 ENCOUNTER — PATIENT MESSAGE (OUTPATIENT)
Dept: ENT CLINIC | Age: 27
End: 2022-10-04

## 2022-10-04 RX ORDER — FLUCONAZOLE 100 MG/1
100 TABLET ORAL DAILY
Qty: 7 TABLET | Refills: 0 | Status: SHIPPED | OUTPATIENT
Start: 2022-10-04 | End: 2022-10-11

## 2022-10-04 NOTE — TELEPHONE ENCOUNTER
From: Carmen Jackson  To: Dr. Stephen Jesus  Sent: 10/4/2022 1:48 PM EDT  Subject: Afrin and Yeast infection     Good afternoon Dr. Clarisa Dyson. Two days ago I stopped using Afrin due to an intense buring of my sinuses and wisdom teeth that lasted approximately 50 minutes. Also I believe all of the antibiotics I'm on maybe causing a yeast infection. Is there anything you can prescribe me to combat this? Thank you for your time.

## 2022-10-04 NOTE — TELEPHONE ENCOUNTER
Order for antifungal sent to pharmacy. Follow up with Dr Choco Orantes as scheduled. Ok to stop the Afrin if causing symptoms. He should be doing the saline irrigations by now I believe.

## 2022-10-11 ENCOUNTER — OFFICE VISIT (OUTPATIENT)
Dept: ENT CLINIC | Age: 27
End: 2022-10-11
Payer: COMMERCIAL

## 2022-10-11 VITALS
WEIGHT: 274.3 LBS | TEMPERATURE: 97.2 F | DIASTOLIC BLOOD PRESSURE: 80 MMHG | HEART RATE: 84 BPM | BODY MASS INDEX: 36.35 KG/M2 | OXYGEN SATURATION: 98 % | SYSTOLIC BLOOD PRESSURE: 122 MMHG | HEIGHT: 73 IN

## 2022-10-11 DIAGNOSIS — J34.89 CONCHA BULLOSA: ICD-10-CM

## 2022-10-11 DIAGNOSIS — J34.3 HYPERTROPHY OF INFERIOR NASAL TURBINATE: ICD-10-CM

## 2022-10-11 DIAGNOSIS — R51.9 RHINOGENIC HEADACHE: ICD-10-CM

## 2022-10-11 DIAGNOSIS — J32.2 CHRONIC ETHMOIDAL SINUSITIS: ICD-10-CM

## 2022-10-11 DIAGNOSIS — J34.2 NASAL SEPTAL DEVIATION: ICD-10-CM

## 2022-10-11 DIAGNOSIS — J34.89 ADHESIONS OF NASAL SEPTUM AND TURBINATES: ICD-10-CM

## 2022-10-11 DIAGNOSIS — J32.0 CHRONIC MAXILLARY SINUSITIS: Primary | ICD-10-CM

## 2022-10-11 PROCEDURE — 99024 POSTOP FOLLOW-UP VISIT: CPT | Performed by: OTOLARYNGOLOGY

## 2022-10-11 PROCEDURE — 31237 NSL/SINS NDSC SURG BX POLYPC: CPT | Performed by: OTOLARYNGOLOGY

## 2022-10-31 DIAGNOSIS — F90.9 ATTENTION DEFICIT HYPERACTIVITY DISORDER (ADHD), UNSPECIFIED ADHD TYPE: ICD-10-CM

## 2022-11-02 ENCOUNTER — OFFICE VISIT (OUTPATIENT)
Dept: FAMILY MEDICINE CLINIC | Age: 27
End: 2022-11-02
Payer: COMMERCIAL

## 2022-11-02 VITALS
HEIGHT: 73 IN | BODY MASS INDEX: 36.58 KG/M2 | TEMPERATURE: 98.1 F | SYSTOLIC BLOOD PRESSURE: 132 MMHG | WEIGHT: 276 LBS | RESPIRATION RATE: 14 BRPM | DIASTOLIC BLOOD PRESSURE: 80 MMHG | HEART RATE: 68 BPM | OXYGEN SATURATION: 99 %

## 2022-11-02 DIAGNOSIS — F90.9 ATTENTION DEFICIT HYPERACTIVITY DISORDER (ADHD), UNSPECIFIED ADHD TYPE: ICD-10-CM

## 2022-11-02 PROCEDURE — G8484 FLU IMMUNIZE NO ADMIN: HCPCS

## 2022-11-02 PROCEDURE — 1036F TOBACCO NON-USER: CPT

## 2022-11-02 PROCEDURE — G8417 CALC BMI ABV UP PARAM F/U: HCPCS

## 2022-11-02 PROCEDURE — G8427 DOCREV CUR MEDS BY ELIG CLIN: HCPCS

## 2022-11-02 PROCEDURE — 99213 OFFICE O/P EST LOW 20 MIN: CPT

## 2022-11-02 ASSESSMENT — ENCOUNTER SYMPTOMS
CHEST TIGHTNESS: 0
SORE THROAT: 0
ABDOMINAL PAIN: 0
NAUSEA: 0
SHORTNESS OF BREATH: 0
COUGH: 0
CONSTIPATION: 0
WHEEZING: 0
EYE PAIN: 0
EYE REDNESS: 0
DIARRHEA: 0
SINUS PAIN: 0
RHINORRHEA: 0

## 2022-11-02 ASSESSMENT — PATIENT HEALTH QUESTIONNAIRE - PHQ9
9. THOUGHTS THAT YOU WOULD BE BETTER OFF DEAD, OR OF HURTING YOURSELF: 0
7. TROUBLE CONCENTRATING ON THINGS, SUCH AS READING THE NEWSPAPER OR WATCHING TELEVISION: 0
3. TROUBLE FALLING OR STAYING ASLEEP: 0
SUM OF ALL RESPONSES TO PHQ QUESTIONS 1-9: 0
SUM OF ALL RESPONSES TO PHQ QUESTIONS 1-9: 0
2. FEELING DOWN, DEPRESSED OR HOPELESS: 0
SUM OF ALL RESPONSES TO PHQ9 QUESTIONS 1 & 2: 0
5. POOR APPETITE OR OVEREATING: 0
SUM OF ALL RESPONSES TO PHQ QUESTIONS 1-9: 0
8. MOVING OR SPEAKING SO SLOWLY THAT OTHER PEOPLE COULD HAVE NOTICED. OR THE OPPOSITE, BEING SO FIGETY OR RESTLESS THAT YOU HAVE BEEN MOVING AROUND A LOT MORE THAN USUAL: 0
6. FEELING BAD ABOUT YOURSELF - OR THAT YOU ARE A FAILURE OR HAVE LET YOURSELF OR YOUR FAMILY DOWN: 0
10. IF YOU CHECKED OFF ANY PROBLEMS, HOW DIFFICULT HAVE THESE PROBLEMS MADE IT FOR YOU TO DO YOUR WORK, TAKE CARE OF THINGS AT HOME, OR GET ALONG WITH OTHER PEOPLE: 0
1. LITTLE INTEREST OR PLEASURE IN DOING THINGS: 0
4. FEELING TIRED OR HAVING LITTLE ENERGY: 0
SUM OF ALL RESPONSES TO PHQ QUESTIONS 1-9: 0

## 2022-11-02 NOTE — PROGRESS NOTES
[address] [place] [address] DR. [place] [address]  Dept: [address]: [address] (:  1995) is a 32 y.o. male, here for evaluation of the following chief complaint(s):  Medication Refill      ASSESSMENT/PLAN:  1. Attention deficit hyperactivity disorder (ADHD), unspecified ADHD type  -     lisdexamfetamine (VYVANSE) 30 MG capsule; Take 1 capsule by mouth every morning for 30 days. , Disp-30 capsule, R-0Normal    ADHD is stable on vyvanse. Encourage medication vacation on days he doesn't work/or days he doesn't need strict concentration. Return in about 3 months (around 2023) for ADHD. SUBJECTIVE/OBJECTIVE:  ARYAN Goodman is here today for a follow up on his ADHD. He is currently on Vyvanse  30 mg. He has been on this since 2022. Prior to this he was on Adderall XR 3 mg. He requested the switch in hope improve side effects of the \"harsh nature of how hard the Adderall hit him\". Since switching to Vyvanse he has noticed the effects last a little longer and has an more gradual onset. He takes it regularly to help him focus on his job at [employer] [address]. He works 12 hour night shifts and does try to vacation from medication every so often when he does not need the strict concentration. He denies feeling withdrawn, emotionally instable, appetite suppression, heart palpitations, or changes in behavior. He states he is doing very well on this medication. He was diagnosed with ADHD when he was 23. At that time he also had depression and anxiety due to multiple life stressors. His depression is much improved and denies any suicidal thoughts. He was recently  this weekend and has a 11 month old baby at home. Does report some troubles sleeping but believes this is due to working 12 hour night shifts with a inconsistent schedule. He states it is tolerable.   Did recently have sinus surgery with Dr. Ana Ellis. He states he is doing well following surgery and has no other concerns. Past Medical History:   Diagnosis Date    ADHD (attention deficit hyperactivity disorder)     Asthma     COVID         Past Surgical History:   Procedure Laterality Date    CLAVICLE SURGERY      HAND SURGERY Right     NOSE SURGERY Right     cautery    SHOULDER SURGERY      SINUS ENDOSCOPY Bilateral 9/28/2022    Septoplasty Submucosal Ablation of Left Inferior Turbinate Image Guided Nasal Endoscopy Partial Resection of Earnestine Bullosa Bilateral Middle Turbinates with Lysis of Synechiae Bilateral Maxillary Antrostomy performed by Vandana Rogers MD at [address]         Family History   Problem Relation Age of Onset    Diabetes Mother     Heart Disease Mother     High Blood Pressure Mother     Hearing Loss Mother     Cataracts Maternal Grandmother     Blindness Maternal Grandmother     Diabetes Maternal Grandfather     High Cholesterol Maternal Grandfather     Cancer Paternal Grandfather         Allergies   Allergen Reactions    Tobacco Shortness Of Breath     Runny nose    Erythromycin Rash    Other      Pet Dander watery eyes and runny nose        Review of Systems   Constitutional:  Negative for appetite change and fatigue. HENT:  Negative for congestion, rhinorrhea, sinus pain and sore throat. Eyes:  Negative for pain and redness. Respiratory:  Negative for cough, chest tightness, shortness of breath and wheezing. Cardiovascular:  Negative for chest pain, palpitations and leg swelling. Gastrointestinal:  Negative for abdominal pain, constipation, diarrhea and nausea. Genitourinary:  Negative for dysuria and hematuria. Musculoskeletal:  Negative for arthralgias and myalgias. Allergic/Immunologic: Positive for environmental allergies. Neurological:  Negative for dizziness and headaches. Psychiatric/Behavioral:  Positive for decreased concentration (Improved when on Vyvanse). Negative for agitation, behavioral problems, confusion, dysphoric mood, hallucinations, self-injury, sleep disturbance and suicidal ideas. The patient is hyperactive (improved on Vyvanse). The patient is not nervous/anxious. Physical Exam  Vitals reviewed. Constitutional:       General: He is not in acute distress. Appearance: Normal appearance. He is well-developed. HENT:      Head: Normocephalic. Right Ear: Tympanic membrane, ear canal and external ear normal.      Left Ear: Tympanic membrane, ear canal and external ear normal.      Nose: Nose normal. No congestion or rhinorrhea. Right Sinus: No maxillary sinus tenderness. Left Sinus: No maxillary sinus tenderness. Mouth/Throat:      Pharynx: Uvula midline. Neck:      Trachea: Trachea normal.   Cardiovascular:      Rate and Rhythm: Normal rate and regular rhythm. Heart sounds: Normal heart sounds. No murmur heard. Pulmonary:      Effort: Pulmonary effort is normal. No respiratory distress. Breath sounds: Normal breath sounds. No decreased breath sounds, wheezing, rhonchi or rales. Chest:      Chest wall: No tenderness. Abdominal:      General: There is no distension. Palpations: Abdomen is soft. There is no mass. Tenderness: There is no abdominal tenderness. Musculoskeletal:         General: No tenderness or deformity. Normal range of motion. Cervical back: Normal range of motion and neck supple. No tenderness. Lymphadenopathy:      Cervical: No cervical adenopathy. Skin:     General: Skin is warm and dry. Neurological:      Mental Status: He is alert and oriented to person, place, and time. Motor: No abnormal muscle tone. Coordination: Coordination normal.      Gait: Gait normal.   Psychiatric:         Attention and Perception: Attention normal.         Mood and Affect: Mood normal. Mood is not anxious or depressed. Affect is not tearful or inappropriate.          Speech: Speech normal.         Behavior: Behavior normal. Behavior is not agitated, withdrawn or hyperactive. Thought Content: Thought content normal.         Judgment: Judgment normal.       Vitals:    11/02/22 1007   BP: 132/80   Pulse: 68   Resp: 14   Temp: 98.1 °F (36.7 °C)   SpO2: 99%        On this date 11/2/2022 I have spent 25 minutes reviewing previous notes, test results and face to face with the patient discussing the diagnosis and importance of compliance with the treatment plan as well as documenting on the day of the visit. An electronic signature was used to authenticate this note.     Brent Comer, APRN - CNP

## 2022-11-04 ENCOUNTER — NURSE ONLY (OUTPATIENT)
Dept: FAMILY MEDICINE CLINIC | Age: 27
End: 2022-11-04
Payer: COMMERCIAL

## 2022-11-04 DIAGNOSIS — R35.0 URINARY FREQUENCY: Primary | ICD-10-CM

## 2022-11-04 DIAGNOSIS — N30.01 ACUTE CYSTITIS WITH HEMATURIA: Primary | ICD-10-CM

## 2022-11-04 LAB
BILIRUBIN, POC: NEGATIVE
BLOOD URINE, POC: NORMAL
CLARITY, POC: NORMAL
COLOR, POC: YELLOW
GLUCOSE URINE, POC: NEGATIVE
KETONES, POC: NEGATIVE
LEUKOCYTE EST, POC: NORMAL
NITRITE, POC: NEGATIVE
PH, POC: 6
PROTEIN, POC: NORMAL
SPECIFIC GRAVITY, POC: >=1.03
UROBILINOGEN, POC: 0.2

## 2022-11-04 PROCEDURE — 81003 URINALYSIS AUTO W/O SCOPE: CPT

## 2022-11-04 RX ORDER — NITROFURANTOIN 25; 75 MG/1; MG/1
100 CAPSULE ORAL 2 TIMES DAILY
Qty: 14 CAPSULE | Refills: 0 | Status: SHIPPED | OUTPATIENT
Start: 2022-11-04 | End: 2022-11-11

## 2022-11-07 LAB
ORGANISM: ABNORMAL
URINE CULTURE, ROUTINE: ABNORMAL

## 2022-11-08 ENCOUNTER — OFFICE VISIT (OUTPATIENT)
Dept: ENT CLINIC | Age: 27
End: 2022-11-08

## 2022-11-08 VITALS
BODY MASS INDEX: 35.12 KG/M2 | OXYGEN SATURATION: 96 % | HEART RATE: 92 BPM | DIASTOLIC BLOOD PRESSURE: 80 MMHG | SYSTOLIC BLOOD PRESSURE: 128 MMHG | TEMPERATURE: 97.6 F | HEIGHT: 73 IN | WEIGHT: 265 LBS

## 2022-11-08 DIAGNOSIS — J32.0 CHRONIC MAXILLARY SINUSITIS: ICD-10-CM

## 2022-11-08 DIAGNOSIS — J34.3 HYPERTROPHY OF INFERIOR NASAL TURBINATE: ICD-10-CM

## 2022-11-08 DIAGNOSIS — R51.9 RHINOGENIC HEADACHE: ICD-10-CM

## 2022-11-08 DIAGNOSIS — J34.89 CONCHA BULLOSA: ICD-10-CM

## 2022-11-08 DIAGNOSIS — G47.30 OBSERVED SLEEP APNEA: ICD-10-CM

## 2022-11-08 DIAGNOSIS — J34.2 NASAL SEPTAL DEVIATION: Primary | ICD-10-CM

## 2022-11-08 DIAGNOSIS — J34.89 ADHESIONS OF NASAL SEPTUM AND TURBINATES: ICD-10-CM

## 2022-11-08 PROCEDURE — 99024 POSTOP FOLLOW-UP VISIT: CPT | Performed by: OTOLARYNGOLOGY

## 2022-11-08 ASSESSMENT — ENCOUNTER SYMPTOMS
CHOKING: 0
VOICE CHANGE: 0
SINUS PRESSURE: 0
NAUSEA: 0
VOMITING: 0
TROUBLE SWALLOWING: 0
SHORTNESS OF BREATH: 0
RHINORRHEA: 0
WHEEZING: 0
CHEST TIGHTNESS: 0
SORE THROAT: 0
COLOR CHANGE: 0
COUGH: 0
STRIDOR: 0
DIARRHEA: 0
FACIAL SWELLING: 0
APNEA: 0
ABDOMINAL PAIN: 0

## 2022-11-08 NOTE — LETTER
[address] and [address]  Phone: [phone]  Fax: Anni Rasheed MD        November 11, 2022    Padma Palacio MD  [address] [place] [address]    Patient: Luis Castro   MR Number: [id number]   YOB: 1995   Date of Visit: 11/8/2022     Dear Padma Palacio,    I recently saw your patient, Luis Castro, regarding follow-up on his nasal and sinus surgery. He has an excellent result with normal airway bilaterally. Sinus symptoms have resolved. He is not tired in the morning and he has no daytime somnolence. I have asked him to have someone observe him 2 hours after he is asleep for 30 minutes and palatine he obstructions or snoring. If there are any questionable sleep issues remaining, I will have him return to see Dr. Villa Done for follow-up. Flora Pagan Below are the relevant portions of my assessment and plan of care. Assessment & Plan   Diagnoses and all orders for this visit:     Diagnosis Orders   1. Nasal septal deviation        2. Hypertrophy of inferior nasal turbinate left        3. Earnestine bullosa both middle turbinates        4. Adhesions of nasal septum and turbinates        5. Chronic maxillary sinusitis        6. Rhinogenic headache        7. Observed sleep apnea        All improved    The findings were explained and his questions were answered. He needs to have someone observe him sleeping and report as to whether he is still snoring. In that case he would need to proceed with sleep medicine evaluation. Continue buffered hypertonic saline irrigations daily for two months. If the patient gets a bacterial sinus infection, present for culture-directed antibiotic therapy. Otherwise return prn. If you have questions, please do not hesitate to call me. It was a pleasure taking care of her.   He has a very nice result and now will have a very different medical future.     Sincerely,      Charity Duff MD

## 2022-11-08 NOTE — PROGRESS NOTES
[facility] PHYSICIANS [place] [facility]  [facility] EAR, NOSE AND THROAT  [facility]  Dept: [phone]  Dept Fax: [phone]  Loc: [phone]    Nando Hearn is a 32 y.o. male who was referred byNo ref. provider found for:  Chief Complaint   Patient presents with    Post-Op Check     Patient is here post-op septo, L itr, igs w B r/s robbie middle turbs, lysis of synechiae, B maxillary 9/28/22   . HPI:     Nando Hearn is a 32 y.o. male who presents today for 1 month follow-up on his nasal and sinus surgery. He is quite happy. He can breathe easily through his nose, and he is sleeping very much better. No daytime somnolence. He has no pressure in his face. No \"sinus\" symptoms. History: Allergies   Allergen Reactions    Tobacco Shortness Of Breath     Runny nose    Erythromycin Rash    Other      Pet Dander watery eyes and runny nose     Current Outpatient Medications   Medication Sig Dispense Refill    nitrofurantoin, macrocrystal-monohydrate, (MACROBID) 100 MG capsule Take 1 capsule by mouth 2 times daily for 7 days 14 capsule 0    lisdexamfetamine (VYVANSE) 30 MG capsule Take 1 capsule by mouth every morning for 30 days. 30 capsule 0    Cetirizine HCl (ZYRTEC ALLERGY PO) Take by mouth daily      albuterol sulfate HFA (VENTOLIN HFA) 108 (90 Base) MCG/ACT inhaler Inhale 2 puffs into the lungs every 6 hours as needed for Wheezing 18 g 2    Misc Natural Products (JOINT HEALTH) CAPS Take by mouth      Multiple Vitamins-Minerals (MENS MULTIVITAMIN) TABS Take by mouth      Omega-3 Fatty Acids (FISH OIL) 1000 MG CAPS Take 3,000 mg by mouth daily      5-Hydroxytryptophan (5-HTP MAXIMUM STRENGTH) 200 MG CAPS Take by mouth      ciprofloxacin (CIPRO) 500 MG tablet Take 1 tablet by mouth 2 times daily for 7 days 14 tablet 0     No current facility-administered medications for this visit.      Past Medical History:   Diagnosis Date    ADHD (attention deficit hyperactivity disorder)     Asthma     COVID       Past Surgical History:   Procedure Laterality Date    CLAVICLE SURGERY      HAND SURGERY Right     NOSE SURGERY Right     cautery    SHOULDER SURGERY      SINUS ENDOSCOPY Bilateral 9/28/2022    Septoplasty Submucosal Ablation of Left Inferior Turbinate Image Guided Nasal Endoscopy Partial Resection of Earnestine Bullosa Bilateral Middle Turbinates with Lysis of Synechiae Bilateral Maxillary Antrostomy performed by Stephen Jesus MD at [address]       Family History   Problem Relation Age of Onset    Diabetes Mother     Heart Disease Mother     High Blood Pressure Mother     Hearing Loss Mother     Cataracts Maternal Grandmother     Blindness Maternal Grandmother     Diabetes Maternal Grandfather     High Cholesterol Maternal Grandfather     Cancer Paternal Grandfather      Social History     Tobacco Use    Smoking status: Never    Smokeless tobacco: Never   Substance Use Topics    Alcohol use: Yes     Comment: drinks whiskey at times       Subjective:      Review of Systems   Constitutional:  Negative for activity change, appetite change, chills, diaphoresis, fatigue, fever and unexpected weight change. HENT:  Negative for congestion, dental problem, ear discharge, ear pain, facial swelling, hearing loss, mouth sores, nosebleeds, postnasal drip, rhinorrhea, sinus pressure, sneezing, sore throat, tinnitus, trouble swallowing and voice change. Eyes:  Negative for visual disturbance. Respiratory:  Negative for apnea, cough, choking, chest tightness, shortness of breath, wheezing and stridor. Cardiovascular:  Negative for chest pain, palpitations and leg swelling. Gastrointestinal:  Negative for abdominal pain, diarrhea, nausea and vomiting. Endocrine: Negative for cold intolerance, heat intolerance, polydipsia and polyuria. Genitourinary:  Negative for dysuria, enuresis and hematuria.    Musculoskeletal:  Negative for arthralgias, gait problem, neck pain and neck stiffness. Skin:  Negative for color change and rash. Allergic/Immunologic: Negative for environmental allergies, food allergies and immunocompromised state. Neurological:  Negative for dizziness, syncope, facial asymmetry, speech difficulty, light-headedness and headaches. Hematological:  Negative for adenopathy. Does not bruise/bleed easily. Psychiatric/Behavioral:  Negative for confusion and sleep disturbance. The patient is not nervous/anxious. Objective:   /80 (Site: Left Upper Arm, Position: Sitting)   Pulse 92   Temp 97.6 °F (36.4 °C) (Infrared)   Ht 6' 1\" (1.854 m)   Wt 265 lb (120.2 kg)   SpO2 96%   BMI 34.96 kg/m²     Physical Exam  Nose: Excellent airway bilaterally and the middle meatus is nicely patent and clean bilaterally. Data:  All of the past medical history, past surgical history, family history,social history, allergies and current medications were reviewed with the patient. Assessment & Plan   Diagnoses and all orders for this visit:     Diagnosis Orders   1. Nasal septal deviation        2. Hypertrophy of inferior nasal turbinate left        3. Earnestine bullosa both middle turbinates        4. Adhesions of nasal septum and turbinates        5. Chronic maxillary sinusitis        6. Rhinogenic headache        7. Observed sleep apnea        All improved    The findings were explained and his questions were answered. He needs to have someone observe him sleeping and report as to whether he is still snoring. In that case he would need to proceed with sleep medicine evaluation. Continue buffered hypertonic saline irrigations daily for two months. If the patient gets a bacterial sinus infection, present for culture-directed antibiotic therapy. Otherwise return prn. River Poole. Tammy Jane MD    **This report has been created using voice recognition software.  It may contain minor errors which are inherent in voicerecognition technology. **

## 2022-11-10 DIAGNOSIS — N30.01 ACUTE CYSTITIS WITH HEMATURIA: Primary | ICD-10-CM

## 2022-11-10 RX ORDER — CIPROFLOXACIN 500 MG/1
500 TABLET, FILM COATED ORAL 2 TIMES DAILY
Qty: 14 TABLET | Refills: 0 | Status: SHIPPED | OUTPATIENT
Start: 2022-11-10 | End: 2022-11-17

## 2022-11-14 NOTE — PROGRESS NOTES
Patient has some facial congestion and is breathing fairly well. Seemed to work better than the other. He has been doing the irrigations. PROCEDURE NOTE: SINUS DEBRIDEMENT     Nasal cavity was topically anesthetized and decongested. Rigid nasal endoscopy was performed and debris was removed from the middle meatus on on the Bilateral side using suction and/or instrumentation. Blood clots and adhesions were addressed. Mucosa appears to be healing well. The patient tolerated the procedure well. He is instructed to continue the present regimen with irrigations and come back in a couple of weeks.

## 2022-11-18 ENCOUNTER — NURSE ONLY (OUTPATIENT)
Dept: FAMILY MEDICINE CLINIC | Age: 27
End: 2022-11-18
Payer: COMMERCIAL

## 2022-11-18 DIAGNOSIS — R35.0 URINARY FREQUENCY: Primary | ICD-10-CM

## 2022-11-18 LAB
BILIRUBIN, POC: NEGATIVE
BLOOD URINE, POC: NORMAL
CLARITY, POC: CLEAR
COLOR, POC: YELLOW
GLUCOSE URINE, POC: NEGATIVE
KETONES, POC: NEGATIVE
LEUKOCYTE EST, POC: NEGATIVE
NITRITE, POC: NEGATIVE
PH, POC: 6
PROTEIN, POC: NEGATIVE
SPECIFIC GRAVITY, POC: 1.02
UROBILINOGEN, POC: 0.2

## 2022-11-18 PROCEDURE — 81003 URINALYSIS AUTO W/O SCOPE: CPT

## 2022-11-18 PROCEDURE — 99999 PR OFFICE/OUTPT VISIT,PROCEDURE ONLY: CPT

## 2022-11-23 ENCOUNTER — NURSE ONLY (OUTPATIENT)
Dept: FAMILY MEDICINE CLINIC | Age: 27
End: 2022-11-23
Payer: COMMERCIAL

## 2022-11-23 DIAGNOSIS — R30.0 DYSURIA: ICD-10-CM

## 2022-11-23 DIAGNOSIS — N39.0 COMPLICATED UTI (URINARY TRACT INFECTION): ICD-10-CM

## 2022-11-23 DIAGNOSIS — R35.0 URINARY FREQUENCY: Primary | ICD-10-CM

## 2022-11-23 DIAGNOSIS — R10.9 FLANK PAIN: Primary | ICD-10-CM

## 2022-11-23 LAB
BILIRUBIN, POC: NEGATIVE
BLOOD URINE, POC: NORMAL
CLARITY, POC: CLEAR
COLOR, POC: NORMAL
GLUCOSE URINE, POC: NEGATIVE
KETONES, POC: NORMAL
LEUKOCYTE EST, POC: NEGATIVE
NITRITE, POC: NEGATIVE
PH, POC: 6
PROTEIN, POC: NEGATIVE
SPECIFIC GRAVITY, POC: >1.03
UROBILINOGEN, POC: 0.2

## 2022-11-23 PROCEDURE — 81003 URINALYSIS AUTO W/O SCOPE: CPT

## 2022-11-23 SDOH — ECONOMIC STABILITY: TRANSPORTATION INSECURITY
IN THE PAST 12 MONTHS, HAS LACK OF TRANSPORTATION KEPT YOU FROM MEETINGS, WORK, OR FROM GETTING THINGS NEEDED FOR DAILY LIVING?: NO

## 2022-11-23 SDOH — ECONOMIC STABILITY: TRANSPORTATION INSECURITY
IN THE PAST 12 MONTHS, HAS THE LACK OF TRANSPORTATION KEPT YOU FROM MEDICAL APPOINTMENTS OR FROM GETTING MEDICATIONS?: NO

## 2022-11-23 SDOH — ECONOMIC STABILITY: FOOD INSECURITY: WITHIN THE PAST 12 MONTHS, YOU WORRIED THAT YOUR FOOD WOULD RUN OUT BEFORE YOU GOT MONEY TO BUY MORE.: NEVER TRUE

## 2022-11-23 SDOH — ECONOMIC STABILITY: FOOD INSECURITY: WITHIN THE PAST 12 MONTHS, THE FOOD YOU BOUGHT JUST DIDN'T LAST AND YOU DIDN'T HAVE MONEY TO GET MORE.: NEVER TRUE

## 2022-11-23 ASSESSMENT — SOCIAL DETERMINANTS OF HEALTH (SDOH): HOW HARD IS IT FOR YOU TO PAY FOR THE VERY BASICS LIKE FOOD, HOUSING, MEDICAL CARE, AND HEATING?: NOT HARD AT ALL

## 2022-12-08 DIAGNOSIS — F90.9 ATTENTION DEFICIT HYPERACTIVITY DISORDER (ADHD), UNSPECIFIED ADHD TYPE: ICD-10-CM

## 2022-12-19 RX ORDER — ALBUTEROL SULFATE 90 UG/1
2 AEROSOL, METERED RESPIRATORY (INHALATION) EVERY 6 HOURS PRN
Qty: 18 G | Refills: 2 | Status: SHIPPED | OUTPATIENT
Start: 2022-12-19

## 2023-01-03 ENCOUNTER — TELEMEDICINE (OUTPATIENT)
Dept: FAMILY MEDICINE CLINIC | Age: 28
End: 2023-01-03
Payer: COMMERCIAL

## 2023-01-03 DIAGNOSIS — J06.9 VIRAL URI: Primary | ICD-10-CM

## 2023-01-03 DIAGNOSIS — R09.81 CONGESTION OF NASAL SINUS: ICD-10-CM

## 2023-01-03 PROCEDURE — 99213 OFFICE O/P EST LOW 20 MIN: CPT

## 2023-01-03 PROCEDURE — G8427 DOCREV CUR MEDS BY ELIG CLIN: HCPCS

## 2023-01-03 PROCEDURE — 1036F TOBACCO NON-USER: CPT

## 2023-01-03 PROCEDURE — G8484 FLU IMMUNIZE NO ADMIN: HCPCS

## 2023-01-03 PROCEDURE — G8417 CALC BMI ABV UP PARAM F/U: HCPCS

## 2023-01-03 RX ORDER — METHYLPREDNISOLONE 4 MG/1
TABLET ORAL
Qty: 1 KIT | Refills: 0 | Status: SHIPPED | OUTPATIENT
Start: 2023-01-03 | End: 2023-01-09

## 2023-01-03 ASSESSMENT — ENCOUNTER SYMPTOMS
CHEST TIGHTNESS: 0
EYE ITCHING: 0
DIARRHEA: 0
EYE DISCHARGE: 0
WHEEZING: 1
RHINORRHEA: 0
COLOR CHANGE: 0
SORE THROAT: 0
NAUSEA: 0
EYE PAIN: 0
SINUS PRESSURE: 1
VOMITING: 0
ABDOMINAL PAIN: 0
SINUS PAIN: 1
COUGH: 0
SHORTNESS OF BREATH: 1
EYE REDNESS: 0

## 2023-01-03 NOTE — PROGRESS NOTES
[address]  [address] [place] [address]  Dept: [phone]  Loc: [address] (:  1995) is a 32 y.o. male, here for evaluation of the following chief complaint(s): Sinus Problem      ASSESSMENT/PLAN:  1. Viral URI  2. Congestion of nasal sinus  -     methylPREDNISolone (MEDROL, TAMIA,) 4 MG tablet; Take with food. , Disp-1 kit, R-0Normal    Reviewed typical course of viral illness. Symptomatic management discussed. Medrol Tamia for for congestion. Push fluids. Monitor for worsening symptoms. Educated on when to seek further care. Return for As needed or if symptoms don't improve. SUBJECTIVE/OBJECTIVE:  HPI    Virtual visit with Chance for concerns of Sinus pressure and pain. He states he tested positive for Covid about 2 weeks ago. His symptoms had improved for two whole days and he developed new symptoms of sinus congestion/pressure, snoring at night, nasal congestion, SOB and wheezing in AM (improves with albuterol use). He does think that today his symptoms are starting to feel better. He has tried daily sinus rinses, mucinex DM, Dayquil, and Zyrtec. Denies fever, CP, body aches, chills, nausea, or vomiting. Review of Systems   Constitutional:  Negative for activity change, appetite change, chills, diaphoresis, fatigue, fever and unexpected weight change. HENT:  Positive for congestion, postnasal drip, sinus pressure and sinus pain. Negative for ear discharge, ear pain, rhinorrhea, sore throat and tinnitus. Eyes:  Negative for pain, discharge, redness and itching. Respiratory:  Positive for shortness of breath and wheezing. Negative for cough and chest tightness. Cardiovascular:  Negative for chest pain and palpitations. Gastrointestinal:  Negative for abdominal pain, diarrhea, nausea and vomiting. Genitourinary:  Negative for decreased urine volume.    Musculoskeletal: Negative for myalgias. Skin:  Negative for color change and rash. Neurological:  Positive for headaches. Negative for dizziness and weakness. Hematological:  Negative for adenopathy. Psychiatric/Behavioral:  Negative for agitation, self-injury, sleep disturbance and suicidal ideas. The patient is not nervous/anxious. Physical Exam  Constitutional:       General: He is not in acute distress. Appearance: Normal appearance. HENT:      Head: Normocephalic and atraumatic. Eyes:      General: No scleral icterus. Pulmonary:      Effort: Pulmonary effort is normal. No respiratory distress. Skin:     Coloration: Skin is not jaundiced. Neurological:      Mental Status: He is alert and oriented to person, place, and time. Psychiatric:         Mood and Affect: Mood normal.         Behavior: Behavior normal.         Thought Content: Thought content normal.         Judgment: Judgment normal.       There were no vitals filed for this visit.     Past Medical History:   Diagnosis Date    ADHD (attention deficit hyperactivity disorder)     Asthma     COVID         Past Surgical History:   Procedure Laterality Date    CLAVICLE SURGERY      HAND SURGERY Right     NOSE SURGERY Right     cautery    SHOULDER SURGERY      SINUS ENDOSCOPY Bilateral 9/28/2022    Septoplasty Submucosal Ablation of Left Inferior Turbinate Image Guided Nasal Endoscopy Partial Resection of Earnestine Bullosa Bilateral Middle Turbinates with Lysis of Synechiae Bilateral Maxillary Antrostomy performed by Reynaldo Austin MD at [address] [place] [address] History     Socioeconomic History    Marital status: Single     Spouse name: Not on file    Number of children: Not on file    Years of education: Not on file    Highest education level: Not on file   Occupational History    Not on file   Tobacco Use    Smoking status: Never    Smokeless tobacco: Never   Vaping Use    Vaping Use: Never used   Substance and Sexual Activity    Alcohol use: Yes     Comment: drinks whiskey at times    Drug use: Not on file     Comment: hasnt in 1 year    Sexual activity: Not on file   Other Topics Concern    Not on file   Social History Narrative    Not on file     Social Determinants of Health     Financial Resource Strain: Low Risk     Difficulty of Paying Living Expenses: Not hard at all   Food Insecurity: No Food Insecurity    Worried About [address] in the Last Year: Never true    [address] in the Last Year: Never true   Transportation Needs: No Transportation Needs    Lack of Transportation (Medical): No    Lack of Transportation (Non-Medical): No   Physical Activity: Not on file   Stress: Not on file   Social Connections: Not on file   Intimate Partner Violence: Not on file   Housing Stability: Not on file        Family History   Problem Relation Age of Onset    Diabetes Mother     Heart Disease Mother     High Blood Pressure Mother     Hearing Loss Mother     Cataracts Maternal Grandmother     Blindness Maternal Grandmother     Diabetes Maternal Grandfather     High Cholesterol Maternal Grandfather     Cancer Paternal Grandfather         Allergies   Allergen Reactions    Tobacco Shortness Of Breath     Runny nose    Erythromycin Rash    Other      Pet Dander watery eyes and runny nose        On this date 1/3/2023 I have spent 24 minutes reviewing previous notes, test results and face to face (virtual) with the patient discussing the diagnosis and importance of compliance with the treatment plan as well as documenting on the day of the visit. Pablito Sofia, was evaluated through a synchronous (real-time) audio-video encounter. The patient (or guardian if applicable) is aware that this is a billable service, which includes applicable co-pays. This Virtual Visit was conducted with patient's (and/or legal guardian's) consent.  The visit was conducted pursuant to the emergency declaration under the [address] and the [employer] [facility],  waiver authority and the [employer] General Act. Patient identification was verified, and a caregiver was present when appropriate. The patient was located in a state where the provider was licensed to provide care. An electronic signature was used to authenticate this note.     HORACIO Fong CNP

## 2023-01-09 DIAGNOSIS — F90.9 ATTENTION DEFICIT HYPERACTIVITY DISORDER (ADHD), UNSPECIFIED ADHD TYPE: ICD-10-CM

## 2023-02-02 ENCOUNTER — OFFICE VISIT (OUTPATIENT)
Dept: FAMILY MEDICINE CLINIC | Age: 28
End: 2023-02-02
Payer: COMMERCIAL

## 2023-02-02 VITALS
HEIGHT: 73 IN | WEIGHT: 271 LBS | RESPIRATION RATE: 16 BRPM | HEART RATE: 78 BPM | TEMPERATURE: 97.8 F | DIASTOLIC BLOOD PRESSURE: 82 MMHG | BODY MASS INDEX: 35.92 KG/M2 | OXYGEN SATURATION: 98 % | SYSTOLIC BLOOD PRESSURE: 130 MMHG

## 2023-02-02 DIAGNOSIS — F90.9 ATTENTION DEFICIT HYPERACTIVITY DISORDER (ADHD), UNSPECIFIED ADHD TYPE: ICD-10-CM

## 2023-02-02 PROCEDURE — G8427 DOCREV CUR MEDS BY ELIG CLIN: HCPCS | Performed by: FAMILY MEDICINE

## 2023-02-02 PROCEDURE — G8484 FLU IMMUNIZE NO ADMIN: HCPCS | Performed by: FAMILY MEDICINE

## 2023-02-02 PROCEDURE — G8417 CALC BMI ABV UP PARAM F/U: HCPCS | Performed by: FAMILY MEDICINE

## 2023-02-02 PROCEDURE — 99213 OFFICE O/P EST LOW 20 MIN: CPT | Performed by: FAMILY MEDICINE

## 2023-02-02 PROCEDURE — 1036F TOBACCO NON-USER: CPT | Performed by: FAMILY MEDICINE

## 2023-02-02 SDOH — ECONOMIC STABILITY: FOOD INSECURITY: WITHIN THE PAST 12 MONTHS, YOU WORRIED THAT YOUR FOOD WOULD RUN OUT BEFORE YOU GOT MONEY TO BUY MORE.: NEVER TRUE

## 2023-02-02 SDOH — ECONOMIC STABILITY: FOOD INSECURITY: WITHIN THE PAST 12 MONTHS, THE FOOD YOU BOUGHT JUST DIDN'T LAST AND YOU DIDN'T HAVE MONEY TO GET MORE.: NEVER TRUE

## 2023-02-02 SDOH — ECONOMIC STABILITY: HOUSING INSECURITY
IN THE LAST 12 MONTHS, WAS THERE A TIME WHEN YOU DID NOT HAVE A STEADY PLACE TO SLEEP OR SLEPT IN A SHELTER (INCLUDING NOW)?: NO

## 2023-02-02 SDOH — ECONOMIC STABILITY: INCOME INSECURITY: HOW HARD IS IT FOR YOU TO PAY FOR THE VERY BASICS LIKE FOOD, HOUSING, MEDICAL CARE, AND HEATING?: NOT HARD AT ALL

## 2023-02-02 ASSESSMENT — PATIENT HEALTH QUESTIONNAIRE - PHQ9
SUM OF ALL RESPONSES TO PHQ QUESTIONS 1-9: 0
SUM OF ALL RESPONSES TO PHQ QUESTIONS 1-9: 0
2. FEELING DOWN, DEPRESSED OR HOPELESS: 0
1. LITTLE INTEREST OR PLEASURE IN DOING THINGS: 0
SUM OF ALL RESPONSES TO PHQ QUESTIONS 1-9: 0
SUM OF ALL RESPONSES TO PHQ QUESTIONS 1-9: 0
SUM OF ALL RESPONSES TO PHQ9 QUESTIONS 1 & 2: 0

## 2023-02-02 ASSESSMENT — ENCOUNTER SYMPTOMS
COUGH: 0
DIARRHEA: 0
ABDOMINAL PAIN: 0
SORE THROAT: 0
RHINORRHEA: 0
SHORTNESS OF BREATH: 0
WHEEZING: 0
NAUSEA: 0
CONSTIPATION: 0

## 2023-02-02 NOTE — PROGRESS NOTES
[address] [place] [address] [address] [place] [address]  Dept: [phone]  Loc: [address] (:  1995) is a 32 y.o. male, here for evaluation of the following chief complaint(s):  ADHD (3 month f/u)      ASSESSMENT/PLAN:  1. Attention deficit hyperactivity disorder (ADHD), unspecified ADHD type  -     lisdexamfetamine (VYVANSE) 30 MG capsule; Take 1 capsule by mouth every morning for 30 days. , Disp-30 capsule, R-0Normal  Vyvanse working well. Refill given  Follow up in 3months or prn    Return in about 3 months (around 2023) for follow up ADHD. SUBJECTIVE/OBJECTIVE:  ADHD  Pertinent negatives include no abdominal pain, arthralgias, chest pain, chills, congestion, coughing, fatigue, fever, headaches, myalgias, nausea or sore throat. Presents for 3-month follow-up of ADHD. States that in general his Vyvanse works well for him. He is able to concentrate for the most part. Denies issues with constipation or insomnia. States that he has started trying to lose weight and has lost a few pounds over the past several months. He did recently have sinus surgery last fall and states he is feeling better in regards to his allergies. He also mentions that he is taking over-the-counter testosterone boosters as he used to take prescription testosterone but that became quite pricey for him. Feels well on his current supplements. Otherwise has no complaints today. Review of Systems   Constitutional:  Negative for chills, fatigue and fever. HENT:  Negative for congestion, rhinorrhea and sore throat. Respiratory:  Negative for cough, shortness of breath and wheezing. Cardiovascular:  Negative for chest pain and palpitations. Gastrointestinal:  Negative for abdominal pain, constipation, diarrhea and nausea. Genitourinary:  Negative for dysuria and hematuria.    Musculoskeletal:  Negative for arthralgias and myalgias. Neurological:  Negative for dizziness and headaches. Psychiatric/Behavioral:  Positive for decreased concentration (improved with vyvanse). Negative for sleep disturbance. The patient is not nervous/anxious. Physical Exam  Vitals and nursing note reviewed. Constitutional:       Appearance: He is well-developed. HENT:      Head: Normocephalic and atraumatic. Eyes:      General: No scleral icterus. Right eye: No discharge. Left eye: No discharge. Conjunctiva/sclera: Conjunctivae normal.   Cardiovascular:      Rate and Rhythm: Normal rate and regular rhythm. Heart sounds: Normal heart sounds. Pulmonary:      Effort: Pulmonary effort is normal.      Breath sounds: Normal breath sounds. No wheezing. Skin:     General: Skin is warm and dry. Neurological:      Mental Status: He is alert and oriented to person, place, and time. Mental status is at baseline. Psychiatric:         Behavior: Behavior normal.         Thought Content: Thought content normal.         Judgment: Judgment normal.       Vitals:    02/02/23 0753   BP: 130/82   Pulse: 78   Resp: 16   Temp: 97.8 °F (36.6 °C)   SpO2: 98%              An electronic signature was used to authenticate this note.     --Michelle Duff MD

## 2023-02-13 RX ORDER — ALBUTEROL SULFATE 90 UG/1
AEROSOL, METERED RESPIRATORY (INHALATION)
Qty: 8.5 G | Refills: 4 | Status: SHIPPED | OUTPATIENT
Start: 2023-02-13

## 2023-03-09 DIAGNOSIS — F90.9 ATTENTION DEFICIT HYPERACTIVITY DISORDER (ADHD), UNSPECIFIED ADHD TYPE: ICD-10-CM

## 2023-05-10 DIAGNOSIS — F90.9 ATTENTION DEFICIT HYPERACTIVITY DISORDER (ADHD), UNSPECIFIED ADHD TYPE: ICD-10-CM

## 2023-05-10 NOTE — TELEPHONE ENCOUNTER
Last visit- 2/2/2023  Next visit- Visit date not found    Requested Prescriptions     Pending Prescriptions Disp Refills    lisdexamfetamine (VYVANSE) 30 MG capsule 30 capsule 0     Sig: Take 1 capsule by mouth every morning for 30 days.

## 2023-05-19 ENCOUNTER — TELEPHONE (OUTPATIENT)
Dept: FAMILY MEDICINE CLINIC | Age: 28
End: 2023-05-19

## 2023-05-22 NOTE — TELEPHONE ENCOUNTER
Submitted through [place] through [employer] as in the past. Stated RX benefit is terminated. Tried to run through [facility] in [place].

## 2023-06-09 DIAGNOSIS — F90.9 ATTENTION DEFICIT HYPERACTIVITY DISORDER (ADHD), UNSPECIFIED ADHD TYPE: ICD-10-CM

## 2023-07-05 ENCOUNTER — TELEMEDICINE (OUTPATIENT)
Dept: FAMILY MEDICINE CLINIC | Age: 28
End: 2023-07-05
Payer: COMMERCIAL

## 2023-07-05 DIAGNOSIS — J01.90 ACUTE BACTERIAL SINUSITIS: ICD-10-CM

## 2023-07-05 DIAGNOSIS — F90.9 ATTENTION DEFICIT HYPERACTIVITY DISORDER (ADHD), UNSPECIFIED ADHD TYPE: Primary | ICD-10-CM

## 2023-07-05 DIAGNOSIS — B96.89 ACUTE BACTERIAL SINUSITIS: ICD-10-CM

## 2023-07-05 PROCEDURE — G8417 CALC BMI ABV UP PARAM F/U: HCPCS

## 2023-07-05 PROCEDURE — G8427 DOCREV CUR MEDS BY ELIG CLIN: HCPCS

## 2023-07-05 PROCEDURE — 99214 OFFICE O/P EST MOD 30 MIN: CPT

## 2023-07-05 PROCEDURE — 1036F TOBACCO NON-USER: CPT

## 2023-07-05 RX ORDER — AMOXICILLIN AND CLAVULANATE POTASSIUM 875; 125 MG/1; MG/1
1 TABLET, FILM COATED ORAL 2 TIMES DAILY
Qty: 20 TABLET | Refills: 0 | Status: SHIPPED | OUTPATIENT
Start: 2023-07-05 | End: 2023-07-15

## 2023-07-05 ASSESSMENT — ENCOUNTER SYMPTOMS
NAUSEA: 0
SINUS PAIN: 1
COUGH: 0
DIARRHEA: 0
SINUS PRESSURE: 1
CONSTIPATION: 0
EYE DISCHARGE: 0
SORE THROAT: 0
RHINORRHEA: 0
EYE ITCHING: 0
SHORTNESS OF BREATH: 0
CHEST TIGHTNESS: 0
VOMITING: 0
EYE REDNESS: 0
COLOR CHANGE: 0
ABDOMINAL PAIN: 0
EYE PAIN: 0
WHEEZING: 0

## 2023-07-05 NOTE — PROGRESS NOTES
[address] [facility] MEDICINE  [address]  [place] [address]  Dept: [phone]  Loc: [address] (:  1995) is a 32 y.o. male, here for evaluation of the following chief complaint(s): ADHD and Sinusitis      ASSESSMENT/PLAN:  1. Attention deficit hyperactivity disorder (ADHD), unspecified ADHD type  2. Acute bacterial sinusitis  -     amoxicillin-clavulanate (AUGMENTIN) 875-125 MG per tablet; Take 1 tablet by mouth 2 times daily for 10 days, Disp-20 tablet, R-0Normal    ADHD stable-continue Vyvanse. Augmentin as prescribed for sinusitis. Reviewed over-the-counter symptomatic relievers. Push fluids. Rest.  Educated on signs of worsening illness and when to seek further care. Return in about 6 months (around 2024) for Follow up. SUBJECTIVE/OBJECTIVE:  ARYAN    Rex presents today for follow up on ADHD as well as concerns for sinus infection. Rex states ADHD is well controlled on Vyvanse. Helps greatly with his concentration. Denies concerns with insomnia, moods, weight changes, or constipation. No depression or anxiety. Additionally, he states he was outside fishing a little over a week ago during the poor air quality advisory. Since then he has developed sinus pain/pressure, nasal congestion, and tooth pain. He did have sinus sx last fall and since then has not had any concerns with allergies or sinus infections. Does take daily allergy medication. Denies fever, headache, cough, N/V/D. Has tried dayquil/nyquil with no relief. Review of Systems   Constitutional:  Negative for activity change, appetite change, chills, diaphoresis, fatigue, fever and unexpected weight change. HENT:  Positive for congestion, postnasal drip, sinus pressure and sinus pain. Negative for ear discharge, ear pain, rhinorrhea, sore throat and tinnitus. Eyes:  Negative for pain, discharge, redness and itching. Group Therapy Documentation    PATIENT'S NAME: Cong Tuttle  MRN:   [id number]  :   1991  ACCT. NUMBER: [id number]  DATE OF SERVICE: 22  START TIME:  8:45 AM  END TIME: 10:45 AM  FACILITATOR(S): Jacqueline Daniels, RN; [facility]  TOPIC: BEH Group Therapy  Number of patients attending the group:  30  Group Length:  2 Hours    Group Therapy Type: Health and wellbeing     Summary of Group / Topics Discussed:    Self-care activities and Nutrition      Group Attendance:  Attended group session    Patient's response to the group topic/interactions:  cooperative with task and listened actively    Patient appeared to be Attentive and Engaged.        Client specific details:   Pt was pleasant and respectful during RN's lecture on nutrition, health, and well-being.

## 2023-07-13 DIAGNOSIS — F90.9 ATTENTION DEFICIT HYPERACTIVITY DISORDER (ADHD), UNSPECIFIED ADHD TYPE: ICD-10-CM

## 2023-08-15 DIAGNOSIS — F90.9 ATTENTION DEFICIT HYPERACTIVITY DISORDER (ADHD), UNSPECIFIED ADHD TYPE: ICD-10-CM

## 2023-08-18 ENCOUNTER — OFFICE VISIT (OUTPATIENT)
Dept: FAMILY MEDICINE CLINIC | Age: 28
End: 2023-08-18
Payer: COMMERCIAL

## 2023-08-18 VITALS
TEMPERATURE: 97.8 F | HEART RATE: 71 BPM | OXYGEN SATURATION: 98 % | HEIGHT: 72 IN | BODY MASS INDEX: 36.3 KG/M2 | DIASTOLIC BLOOD PRESSURE: 88 MMHG | SYSTOLIC BLOOD PRESSURE: 126 MMHG | RESPIRATION RATE: 16 BRPM | WEIGHT: 268 LBS

## 2023-08-18 DIAGNOSIS — L08.9 INFECTED ABRASION OF SKIN OF RIGHT LOWER LEG: Primary | ICD-10-CM

## 2023-08-18 DIAGNOSIS — S80.811A INFECTED ABRASION OF SKIN OF RIGHT LOWER LEG: Primary | ICD-10-CM

## 2023-08-18 PROCEDURE — 99213 OFFICE O/P EST LOW 20 MIN: CPT

## 2023-08-18 RX ORDER — SULFAMETHOXAZOLE AND TRIMETHOPRIM 800; 160 MG/1; MG/1
1 TABLET ORAL 2 TIMES DAILY
Qty: 20 TABLET | Refills: 0 | Status: SHIPPED | OUTPATIENT
Start: 2023-08-18 | End: 2023-08-28

## 2023-08-18 ASSESSMENT — ENCOUNTER SYMPTOMS
DIARRHEA: 0
SHORTNESS OF BREATH: 0
COUGH: 0
ABDOMINAL PAIN: 0
RHINORRHEA: 0
NAUSEA: 0
WHEEZING: 0
CONSTIPATION: 0
SORE THROAT: 0

## 2023-08-18 NOTE — PROGRESS NOTES
[address] [facility] MEDICINE  [address] [facility] [address]  Dept: [phone]  Loc: [address] [facility] (:  1995) is a 32 y.o. male, here for evaluation of the following chief complaint(s):  Laceration (Pt states he hit the back of his right ankle on a pallet. He states this happened two days ago. He does have some bruising on his medial foot. He does have some pain with walking)      ASSESSMENT/PLAN:  1. Infected abrasion of skin of right lower leg  -     sulfamethoxazole-trimethoprim (BACTRIM DS;SEPTRA DS) 800-160 MG per tablet; Take 1 tablet by mouth 2 times daily for 10 days, Disp-20 tablet, R-0Normal    Bactrim for superficial skin infect at injury site. Keep covered and clean. Apply triple antibiotic ointment. Probiotic while taking Bactrim. My chart picture next week or follow up in office to evaluate how area is looking. Return for As needed or if symptoms don't improve. SUBJECTIVE/OBJECTIVE:  ARYAN Goodman is here today for concerns of skin infection. Two days ago at work a wooden pallet fell and scraped the back of his right leg. He had superficial abrasions. His ankle was swollen and bruised. Does hurt some to walk but has been stretching and icing it. The abrasions he has been trying to keep clean and covered. Today he noticed some yellow, clear drainage. No fever.          Past Medical History:   Diagnosis Date    ADHD (attention deficit hyperactivity disorder)     Asthma     COVID         Past Surgical History:   Procedure Laterality Date    CLAVICLE SURGERY      HAND SURGERY Right     NOSE SURGERY Right     cautery    SHOULDER SURGERY      SINUS ENDOSCOPY Bilateral 2022    Septoplasty Submucosal Ablation of Left Inferior Turbinate Image Guided Nasal Endoscopy Partial Resection of Earnestine Bullosa Bilateral Middle Turbinates with Lysis of Synechiae Bilateral Maxillary Antrostomy performed by Samuel Lawson

## 2023-08-20 ENCOUNTER — APPOINTMENT (OUTPATIENT)
Dept: GENERAL RADIOLOGY | Age: 28
End: 2023-08-20
Payer: COMMERCIAL

## 2023-08-20 ENCOUNTER — HOSPITAL ENCOUNTER (EMERGENCY)
Age: 28
Discharge: HOME | End: 2023-08-20
Attending: EMERGENCY MEDICINE
Payer: COMMERCIAL

## 2023-08-20 VITALS
TEMPERATURE: 97.7 F | OXYGEN SATURATION: 97 % | DIASTOLIC BLOOD PRESSURE: 68 MMHG | HEART RATE: 82 BPM | RESPIRATION RATE: 16 BRPM | SYSTOLIC BLOOD PRESSURE: 129 MMHG

## 2023-08-20 DIAGNOSIS — L03.119 ANKLE CELLULITIS: Primary | ICD-10-CM

## 2023-08-20 DIAGNOSIS — S86.001A INJURY OF RIGHT ACHILLES TENDON, INITIAL ENCOUNTER: ICD-10-CM

## 2023-08-20 PROCEDURE — 6370000000 HC RX 637 (ALT 250 FOR IP): Performed by: EMERGENCY MEDICINE

## 2023-08-20 PROCEDURE — 99283 EMERGENCY DEPT VISIT LOW MDM: CPT

## 2023-08-20 PROCEDURE — 73610 X-RAY EXAM OF ANKLE: CPT

## 2023-08-20 PROCEDURE — 73630 X-RAY EXAM OF FOOT: CPT

## 2023-08-20 RX ORDER — IBUPROFEN 400 MG/1
400 TABLET ORAL EVERY 6 HOURS PRN
Qty: 20 TABLET | Refills: 0 | Status: SHIPPED | OUTPATIENT
Start: 2023-08-20

## 2023-08-20 RX ORDER — IBUPROFEN 200 MG
400 TABLET ORAL ONCE
Status: COMPLETED | OUTPATIENT
Start: 2023-08-20 | End: 2023-08-20

## 2023-08-20 RX ORDER — CEPHALEXIN 500 MG/1
500 CAPSULE ORAL 2 TIMES DAILY
Qty: 14 CAPSULE | Refills: 0 | Status: SHIPPED | OUTPATIENT
Start: 2023-08-20 | End: 2023-08-27

## 2023-08-20 RX ADMIN — IBUPROFEN 400 MG: 200 TABLET, FILM COATED ORAL at 16:27

## 2023-08-20 ASSESSMENT — PAIN DESCRIPTION - LOCATION: LOCATION: ANKLE

## 2023-08-20 ASSESSMENT — PAIN - FUNCTIONAL ASSESSMENT: PAIN_FUNCTIONAL_ASSESSMENT: 0-10

## 2023-08-20 ASSESSMENT — PAIN SCALES - GENERAL: PAINLEVEL_OUTOF10: 6

## 2023-08-20 ASSESSMENT — PAIN DESCRIPTION - DESCRIPTORS: DESCRIPTORS: ACHING

## 2023-08-20 ASSESSMENT — PAIN DESCRIPTION - ORIENTATION: ORIENTATION: RIGHT

## 2023-08-20 NOTE — ED TRIAGE NOTES
Pt arrival to the ER with complaint of injuring the back of his foot and ankle on the right last week when a heavy item fell on top of it at home in his garage. He seen his PCP and was started on antibiotics for treatment. Patient states it has been swollen and bruising and is concerned he needs an xray. Patient is still on Bactrim. Patient has bruising noted to the back of his right foot. Patient states the pain is better then it was last week. Patient is able to move foot without difficulty. Patient alert and oriented and breathing with ease. Skin warm pink and dry.

## 2023-08-20 NOTE — ED NOTES
Patient in stable condition. Alert and oriented x3. Unlabored breathing present. Patient aware of plan of care. Patient discharge instructions given and explained. Follow up information instructions given. Prescription order explained to patient. Pharmacy with patient verified. Patient agreeable to plan of care. Patient states understanding and denies any questions or concerns. Patient ambulated out of ER with no complications.         Aruna Pete RN  08/20/23 [id number]

## 2023-08-20 NOTE — ED PROVIDER NOTES
[address] ENCOUNTER          Pt Name: Cindy Galindo  MRN: [id number]  [address] [facility] 1995  Date of evaluation: 8/20/2023  Physician: Keyana Kahn MD, Judy Gauthier, [address] [place] [address]       Chief Complaint   Patient presents with    Foot Injury    Ankle Pain     Right swelling not getting better          HISTORY OF PRESENT ILLNESS    HPI  Cindy Galindo is a 32 y.o. male who presents to the emergency department from home, as a walk in to the ED lobby for evaluation of leg and foot pain. Patient states about a week ago heavy shelving unit fell on the back of his distal right leg. Patient had immediate pain but felt okay. Several days later noticed purulent discharge from the wound for which he went to his primary care doctor. He was started on Bactrim and the patient has been applying Neosporin. He notices significant improvement in the edema and redness. He comes today because pain persists and now he is noticing bruising on his foot. Patient is ambulatory with some pain but has been able to work. The patient has no other acute complaints at this time. PAST MEDICAL AND SURGICAL HISTORY     Past Medical History:   Diagnosis Date    ADHD (attention deficit hyperactivity disorder)     Asthma     COVID      Past Surgical History:   Procedure Laterality Date    CLAVICLE SURGERY      HAND SURGERY Right     NOSE SURGERY Right     cautery    SHOULDER SURGERY      SINUS ENDOSCOPY Bilateral 9/28/2022    Septoplasty Submucosal Ablation of Left Inferior Turbinate Image Guided Nasal Endoscopy Partial Resection of Earnestine Bullosa Bilateral Middle Turbinates with Lysis of Synechiae Bilateral Maxillary Antrostomy performed by Toby Reina MD at [address]   No current facility-administered medications for this encounter.     Current Outpatient Medications:     cephALEXin (KEFLEX) 500 MG capsule,

## 2023-09-16 DIAGNOSIS — F90.9 ATTENTION DEFICIT HYPERACTIVITY DISORDER (ADHD), UNSPECIFIED ADHD TYPE: ICD-10-CM

## 2023-10-16 DIAGNOSIS — F90.9 ATTENTION DEFICIT HYPERACTIVITY DISORDER (ADHD), UNSPECIFIED ADHD TYPE: ICD-10-CM

## 2023-10-16 RX ORDER — LISDEXAMFETAMINE DIMESYLATE CAPSULES 30 MG/1
30 CAPSULE ORAL EVERY MORNING
Qty: 30 CAPSULE | Refills: 0 | Status: SHIPPED | OUTPATIENT
Start: 2023-10-16 | End: 2023-11-15

## 2023-10-16 RX ORDER — ALBUTEROL SULFATE 90 UG/1
2 AEROSOL, METERED RESPIRATORY (INHALATION) EVERY 6 HOURS PRN
Qty: 8.5 G | Refills: 4 | Status: SHIPPED | OUTPATIENT
Start: 2023-10-16

## 2023-10-17 ENCOUNTER — OFFICE VISIT (OUTPATIENT)
Dept: FAMILY MEDICINE CLINIC | Age: 28
End: 2023-10-17
Payer: COMMERCIAL

## 2023-10-17 ENCOUNTER — HOSPITAL ENCOUNTER (OUTPATIENT)
Age: 28
Discharge: HOME | End: 2023-10-17
Payer: COMMERCIAL

## 2023-10-17 VITALS
BODY MASS INDEX: 36.75 KG/M2 | TEMPERATURE: 98.4 F | WEIGHT: 271 LBS | OXYGEN SATURATION: 98 % | DIASTOLIC BLOOD PRESSURE: 84 MMHG | SYSTOLIC BLOOD PRESSURE: 136 MMHG | RESPIRATION RATE: 16 BRPM | HEART RATE: 84 BPM

## 2023-10-17 DIAGNOSIS — R53.83 OTHER FATIGUE: ICD-10-CM

## 2023-10-17 DIAGNOSIS — F90.9 ATTENTION DEFICIT HYPERACTIVITY DISORDER (ADHD), UNSPECIFIED ADHD TYPE: Primary | ICD-10-CM

## 2023-10-17 LAB
ANION GAP SERPL CALC-SCNC: 15 MEQ/L (ref 8–16)
BASOPHILS ABSOLUTE: 0.1 THOU/MM3 (ref 0–0.1)
BASOPHILS NFR BLD AUTO: 0.7 %
BUN SERPL-MCNC: 15 MG/DL (ref 7–22)
CALCIUM SERPL-MCNC: 9.3 MG/DL (ref 8.5–10.5)
CHLORIDE SERPL-SCNC: 105 MEQ/L (ref 98–111)
CO2 SERPL-SCNC: 25 MEQ/L (ref 23–33)
CREAT SERPL-MCNC: 0.9 MG/DL (ref 0.4–1.2)
DEPRECATED RDW RBC AUTO: 39.4 FL (ref 35–45)
EOSINOPHIL NFR BLD AUTO: 3.8 %
EOSINOPHILS ABSOLUTE: 0.4 THOU/MM3 (ref 0–0.4)
ERYTHROCYTE [DISTWIDTH] IN BLOOD BY AUTOMATED COUNT: 12.1 % (ref 11.5–14.5)
GFR SERPL CREATININE-BSD FRML MDRD: > 60 ML/MIN/1.73M2
GLUCOSE SERPL-MCNC: 86 MG/DL (ref 70–108)
HCT VFR BLD AUTO: 44.4 % (ref 42–52)
HGB BLD-MCNC: 14.7 GM/DL (ref 14–18)
IMM GRANULOCYTES # BLD AUTO: 0.02 THOU/MM3 (ref 0–0.07)
IMM GRANULOCYTES NFR BLD AUTO: 0.2 %
LYMPHOCYTES ABSOLUTE: 4.2 THOU/MM3 (ref 1–4.8)
LYMPHOCYTES NFR BLD AUTO: 42.6 %
MCH RBC QN AUTO: 29.7 PG (ref 26–33)
MCHC RBC AUTO-ENTMCNC: 33.1 GM/DL (ref 32.2–35.5)
MCV RBC AUTO: 89.7 FL (ref 80–94)
MONOCYTES ABSOLUTE: 0.8 THOU/MM3 (ref 0.4–1.3)
MONOCYTES NFR BLD AUTO: 8 %
NEUTROPHILS NFR BLD AUTO: 44.7 %
NRBC BLD AUTO-RTO: 0 /100 WBC
PLATELET # BLD AUTO: 255 THOU/MM3 (ref 130–400)
PMV BLD AUTO: 10.3 FL (ref 9.4–12.4)
POTASSIUM SERPL-SCNC: 4.1 MEQ/L (ref 3.5–5.2)
RBC # BLD AUTO: 4.95 MILL/MM3 (ref 4.7–6.1)
SEGMENTED NEUTROPHILS ABSOLUTE COUNT: 4.4 THOU/MM3 (ref 1.8–7.7)
SODIUM SERPL-SCNC: 145 MEQ/L (ref 135–145)
T4 FREE SERPL-MCNC: 1.21 NG/DL (ref 0.93–1.76)
TSH SERPL DL<=0.005 MIU/L-ACNC: 8.06 UIU/ML (ref 0.4–4.2)
WBC # BLD AUTO: 9.8 THOU/MM3 (ref 4.8–10.8)

## 2023-10-17 PROCEDURE — 84403 ASSAY OF TOTAL TESTOSTERONE: CPT

## 2023-10-17 PROCEDURE — 84443 ASSAY THYROID STIM HORMONE: CPT

## 2023-10-17 PROCEDURE — 99213 OFFICE O/P EST LOW 20 MIN: CPT

## 2023-10-17 PROCEDURE — 80048 BASIC METABOLIC PNL TOTAL CA: CPT

## 2023-10-17 PROCEDURE — 84439 ASSAY OF FREE THYROXINE: CPT

## 2023-10-17 PROCEDURE — 85025 COMPLETE CBC W/AUTO DIFF WBC: CPT

## 2023-10-17 PROCEDURE — 36415 COLL VENOUS BLD VENIPUNCTURE: CPT

## 2023-10-17 PROCEDURE — 84480 ASSAY TRIIODOTHYRONINE (T3): CPT

## 2023-10-17 ASSESSMENT — ENCOUNTER SYMPTOMS
RHINORRHEA: 0
DIARRHEA: 0
COUGH: 0
SHORTNESS OF BREATH: 0
SORE THROAT: 0
CONSTIPATION: 0
ABDOMINAL PAIN: 0
WHEEZING: 0
NAUSEA: 0

## 2023-10-17 NOTE — PROGRESS NOTES
[facility] MEDICINE  [address] [place] [address]  Dept: [phone]  Loc: [facility] (:  1995) is a 25 y.o. male, here for evaluation of the following chief complaint(s):  Sleep Problem (Patient states that he has been sleeping more than normal. 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 Also has been experiencing short-term memory issue and brain fog. )      ASSESSMENT/PLAN:  1. Attention deficit hyperactivity disorder (ADHD), unspecified ADHD type  2. Other fatigue  -     TSH with Reflex; Future  -     CBC with Auto Differential; Future  -     Testosterone; Future  -     Basic Metabolic Panel; Future    Refill Vyvanse. ADHD well controlled. Labs to evaluate fatigue. Testosterone to be drawn when he normally wakes up for work in evening. Shift work likely contributes. Melatonin prior to sleep. Increase physical activity. Discussed use, benefit, and side effects of prescribed medications. Barriers to medication compliance addressed. All patient questions answered. Pt voiced understanding. Return in about 3 months (around 2024) for Follow up. SUBJECTIVE/OBJECTIVE:  ARYAN Goodman is here today for ADHD follow up as well as concern for fatigue. ADHD overall is controlled on vyvanse. Does do drug holidays when not working. Noticed big difference with concentration and ability to complete tasks when on medication. Denies any appetite changes. Does have troubles sleeping. Denies any chest pain or heart palpitations. Fatigue and sleep concerns- Works third shift 5 days a week. During week gets about 6-8 hours of sleep. Switches schedules when off of work. When off work he is constantly tired and could sleep for 10-12 hours. Has troubles falling asleep- does not depend of if he took vyvanse that day or not. Sometimes he can fall asleep easily but other he may lay awake for an hour or two.

## 2023-10-20 LAB — TESTOST SERPL-MCNC: 231 NG/DL (ref 300–1080)

## 2023-10-24 DIAGNOSIS — R79.89 LOW TESTOSTERONE: ICD-10-CM

## 2023-10-24 DIAGNOSIS — E03.9 HYPOTHYROIDISM, UNSPECIFIED TYPE: Primary | ICD-10-CM

## 2023-10-24 DIAGNOSIS — R53.83 OTHER FATIGUE: ICD-10-CM

## 2023-10-24 LAB — T3 TOTAL: 123 NG/DL (ref 80–200)

## 2023-11-02 ENCOUNTER — NURSE ONLY (OUTPATIENT)
Dept: FAMILY MEDICINE CLINIC | Age: 28
End: 2023-11-02
Payer: COMMERCIAL

## 2023-11-02 DIAGNOSIS — R79.89 LOW TESTOSTERONE: ICD-10-CM

## 2023-11-02 DIAGNOSIS — R53.83 OTHER FATIGUE: ICD-10-CM

## 2023-11-02 DIAGNOSIS — E03.9 HYPOTHYROIDISM, UNSPECIFIED TYPE: ICD-10-CM

## 2023-11-02 DIAGNOSIS — R53.83 OTHER FATIGUE: Primary | ICD-10-CM

## 2023-11-02 LAB
T3 TOTAL: 111 NG/DL (ref 80–200)
T4 FREE SERPL-MCNC: 1.04 NG/DL (ref 0.93–1.76)
TSH SERPL DL<=0.005 MIU/L-ACNC: 3.35 UIU/ML (ref 0.4–4.2)

## 2023-11-02 PROCEDURE — 36415 COLL VENOUS BLD VENIPUNCTURE: CPT

## 2023-11-02 NOTE — PROGRESS NOTES
Venipuncture obtained from the right arm. Patient tolerated procedure without complications or complaints.

## 2023-11-03 LAB — TESTOST SERPL-MCNC: 263 NG/DL (ref 300–1080)

## 2023-11-07 ENCOUNTER — PATIENT MESSAGE (OUTPATIENT)
Dept: FAMILY MEDICINE CLINIC | Age: 28
End: 2023-11-07

## 2023-11-07 DIAGNOSIS — Z13.1 SCREENING FOR DIABETES MELLITUS: ICD-10-CM

## 2023-11-07 DIAGNOSIS — R79.89 LOW TESTOSTERONE: ICD-10-CM

## 2023-11-07 DIAGNOSIS — R53.83 OTHER FATIGUE: Primary | ICD-10-CM

## 2023-11-08 NOTE — TELEPHONE ENCOUNTER
Sanjay, Processor 11/8/2023 1:56 PM EST    I would also like to test estradiol and SHBG if possible. Is there anything else you'd recommend other than. Prolactin to go along with it?

## 2023-11-13 ENCOUNTER — NURSE ONLY (OUTPATIENT)
Dept: FAMILY MEDICINE CLINIC | Age: 28
End: 2023-11-13
Payer: COMMERCIAL

## 2023-11-13 DIAGNOSIS — R53.83 OTHER FATIGUE: Primary | ICD-10-CM

## 2023-11-13 DIAGNOSIS — R79.89 LOW TESTOSTERONE: ICD-10-CM

## 2023-11-13 DIAGNOSIS — Z13.1 SCREENING FOR DIABETES MELLITUS: ICD-10-CM

## 2023-11-13 DIAGNOSIS — R53.83 OTHER FATIGUE: ICD-10-CM

## 2023-11-13 LAB — PROLACTIN SERPL-MCNC: 15.9 NG/ML

## 2023-11-13 PROCEDURE — 36415 COLL VENOUS BLD VENIPUNCTURE: CPT | Performed by: NURSE PRACTITIONER

## 2023-11-14 ENCOUNTER — PATIENT MESSAGE (OUTPATIENT)
Dept: FAMILY MEDICINE CLINIC | Age: 28
End: 2023-11-14

## 2023-11-14 LAB
DEPRECATED MEAN GLUCOSE BLD GHB EST-ACNC: 99 MG/DL (ref 70–126)
ESTRADIOL LEVEL: 23 PG/ML
FOLLICLE STIMULATING HORMONE: 3.3 MIU/ML
HBA1C MFR BLD HPLC: 5.3 % (ref 4.4–6.4)
INSULIN SERPL-ACNC: 13.8 MU/L
LUTEINIZING HORMONE: 6 MIU/ML (ref 1.7–8.6)

## 2023-11-14 NOTE — TELEPHONE ENCOUNTER
From: Rena Frias  To: Altamease Kocher  Sent: 11/14/2023 12:15 PM EST  Subject: Sleep Apnea    My wife confirmed after last night that I occasionally stop breathing in my sleep anywhere from 7 to 15 seconds at a time.

## 2023-11-16 LAB — TESTOSTERONE FREE: NORMAL

## 2023-11-20 DIAGNOSIS — F90.9 ATTENTION DEFICIT HYPERACTIVITY DISORDER (ADHD), UNSPECIFIED ADHD TYPE: ICD-10-CM

## 2023-11-20 RX ORDER — LISDEXAMFETAMINE DIMESYLATE CAPSULES 30 MG/1
30 CAPSULE ORAL EVERY MORNING
Qty: 30 CAPSULE | Refills: 0 | Status: SHIPPED | OUTPATIENT
Start: 2023-11-20 | End: 2023-12-20

## 2023-11-20 NOTE — TELEPHONE ENCOUNTER
Last appointment this department: 10/17/2023  Next appointment this department: Visit date not found

## 2023-12-15 ENCOUNTER — PATIENT MESSAGE (OUTPATIENT)
Dept: FAMILY MEDICINE CLINIC | Age: 28
End: 2023-12-15

## 2023-12-15 DIAGNOSIS — R79.89 LOW TESTOSTERONE: ICD-10-CM

## 2023-12-15 DIAGNOSIS — R53.83 OTHER FATIGUE: Primary | ICD-10-CM

## 2023-12-15 NOTE — TELEPHONE ENCOUNTER
From: Mirna Krishnamurthy  To: Drew Hayward  Sent: 12/15/2023 12:33 PM EST  Subject: Testosterone    Good Afternoon Jezaysalma Alvarado. Is there any way I can get labs done again pertaining to my testosterone,estradiol,ect. My thyroid feels like it's in check. I have been taking things to boost my testosterone and want to see how much it is really helping or if I am just wasting my money. Thank you.

## 2023-12-22 DIAGNOSIS — F90.9 ATTENTION DEFICIT HYPERACTIVITY DISORDER (ADHD), UNSPECIFIED ADHD TYPE: ICD-10-CM

## 2023-12-26 ENCOUNTER — HOSPITAL ENCOUNTER (OUTPATIENT)
Age: 28
Discharge: HOME | End: 2023-12-26
Payer: COMMERCIAL

## 2023-12-26 DIAGNOSIS — R53.83 OTHER FATIGUE: ICD-10-CM

## 2023-12-26 DIAGNOSIS — R79.89 LOW TESTOSTERONE: ICD-10-CM

## 2023-12-26 LAB
ESTRADIOL LEVEL: 22 PG/ML (ref 27–52)
FOLLICLE STIMULATING HORMONE: 3.5 MIU/ML (ref 1.5–12.4)
LUTEINIZING HORMONE: 7.1 MIU/ML (ref 1.7–8.6)
PROLACTIN SERPL-MCNC: 21.8 NG/ML

## 2023-12-26 PROCEDURE — 84146 ASSAY OF PROLACTIN: CPT

## 2023-12-26 PROCEDURE — 82670 ASSAY OF TOTAL ESTRADIOL: CPT

## 2023-12-26 PROCEDURE — 84270 ASSAY OF SEX HORMONE GLOBUL: CPT

## 2023-12-26 PROCEDURE — 36415 COLL VENOUS BLD VENIPUNCTURE: CPT

## 2023-12-26 PROCEDURE — 83001 ASSAY OF GONADOTROPIN (FSH): CPT

## 2023-12-26 PROCEDURE — 84403 ASSAY OF TOTAL TESTOSTERONE: CPT

## 2023-12-26 PROCEDURE — 83002 ASSAY OF GONADOTROPIN (LH): CPT

## 2023-12-26 RX ORDER — LISDEXAMFETAMINE DIMESYLATE CAPSULES 30 MG/1
30 CAPSULE ORAL EVERY MORNING
Qty: 30 CAPSULE | Refills: 0 | Status: SHIPPED | OUTPATIENT
Start: 2023-12-26 | End: 2024-01-25

## 2023-12-28 ENCOUNTER — PATIENT MESSAGE (OUTPATIENT)
Dept: FAMILY MEDICINE CLINIC | Age: 28
End: 2023-12-28

## 2023-12-28 DIAGNOSIS — R53.83 OTHER FATIGUE: Primary | ICD-10-CM

## 2023-12-28 DIAGNOSIS — E03.9 HYPOTHYROIDISM, UNSPECIFIED TYPE: ICD-10-CM

## 2023-12-28 DIAGNOSIS — R79.89 LOW TESTOSTERONE: ICD-10-CM

## 2023-12-28 LAB
SHBG SERPL-SCNC: NORMAL NMOL/L
TESTOST SERPL-MCNC: 206 NG/DL (ref 300–1080)

## 2023-12-29 NOTE — TELEPHONE ENCOUNTER
From: Zana Bailey  Sent: 12/28/2023 11:13 PM EST  To: [facility] Clinical Staff  Subject: Labs    You are absolutely right. I did this in hopes to jumpstart to my natural testosterone production. I felt much better the 1st two weeks of administration. This is leading me to believe I may have primary hypogonadism and need to go back on TRT and stay on it indefinitely or until I get my health back in check.

## 2024-01-18 DIAGNOSIS — F90.9 ATTENTION DEFICIT HYPERACTIVITY DISORDER (ADHD), UNSPECIFIED ADHD TYPE: ICD-10-CM

## 2024-01-19 RX ORDER — LISDEXAMFETAMINE DIMESYLATE CAPSULES 30 MG/1
30 CAPSULE ORAL EVERY MORNING
Qty: 30 CAPSULE | Refills: 0 | Status: SHIPPED | OUTPATIENT
Start: 2024-01-19 | End: 2024-02-18

## 2024-02-05 ENCOUNTER — TELEPHONE (OUTPATIENT)
Dept: ADMINISTRATIVE | Facility: CLINIC | Age: 29
End: 2024-02-05

## 2024-02-05 ENCOUNTER — OFFICE VISIT (OUTPATIENT)
Dept: PULMONOLOGY | Age: 29
End: 2024-02-05
Payer: COMMERCIAL

## 2024-02-05 VITALS
HEART RATE: 81 BPM | TEMPERATURE: 99.5 F | SYSTOLIC BLOOD PRESSURE: 124 MMHG | BODY MASS INDEX: 37.29 KG/M2 | HEIGHT: 73 IN | OXYGEN SATURATION: 96 % | WEIGHT: 281.4 LBS | DIASTOLIC BLOOD PRESSURE: 70 MMHG

## 2024-02-05 DIAGNOSIS — G47.19 EXCESSIVE DAYTIME SLEEPINESS: Primary | ICD-10-CM

## 2024-02-05 DIAGNOSIS — R06.83 LOUD SNORING: ICD-10-CM

## 2024-02-05 DIAGNOSIS — R06.81 WITNESSED EPISODE OF APNEA: ICD-10-CM

## 2024-02-05 DIAGNOSIS — E66.9 OBESITY (BMI 30-39.9): ICD-10-CM

## 2024-02-05 PROCEDURE — 99214 OFFICE O/P EST MOD 30 MIN: CPT

## 2024-02-05 RX ORDER — ANASTROZOLE 1 MG/1
1 TABLET ORAL 2 TIMES DAILY
COMMUNITY
Start: 2024-01-22

## 2024-02-05 RX ORDER — BENZONATATE 100 MG/1
100 CAPSULE ORAL 2 TIMES DAILY
COMMUNITY

## 2024-02-05 ASSESSMENT — ENCOUNTER SYMPTOMS
COUGH: 0
RHINORRHEA: 0
APNEA: 1
SHORTNESS OF BREATH: 0
WHEEZING: 0
SINUS PRESSURE: 0
SINUS PAIN: 0
SORE THROAT: 0

## 2024-02-05 NOTE — PROGRESS NOTES
[facility]    Patient: ZANA SANON, 28 y.o.   : 1995  2024   Pt of Dr. Fisher    Assessment      Diagnosis Orders   1. Obesity (BMI 30-39.9)        2. Excessive daytime sleepiness        3. Loud snoring  Home Sleep Study      4. Witnessed episode of apnea  Home Sleep Study           Plan   -Given symptoms,  will proceed with a sleep study at home. He will follow up with me 1 week after the HST to review and discuss results.   -Discussed treatment options if sleep study is positive  -Encouraged to work on weight loss - he is motivated to lose weight at this time. We discussed the  benefits of weight loss as it relates to sleep apnea.  -Encouraged to get in 7 to 9 hours of sleep  -Pulmonary hygiene discussed    Robbi Reynoso, HORACIO - CNP   2024      Subjective     Chief Complaint   Patient presents with    Follow-up     Ron f/u ... Never completed study         HPI  Zana was referred by Dr. Jennifer Cortez for ? RON.      Switched to day shift about 1 month ago.   Vyvance for 4-5 months. Was previously taking adderall ER.   He underwent nasal and sinus surgery in  with Dr. Soler for deviated septum, Hypertrophy of inferior nasal turbinate left, Earnestine bullosa both middle turbinates, Adhesions of nasal septum and turbinates, Chronic maxillary sinusitis.    SAQLI: 38  ESS: 9    SLEEP HISTORY    Sleep Symptoms  This has been evaluated before, but he did not follow up with ordered sleep studies in .  Sleep related complaints include snoring, choking, periods of not breathing, tossing and turning, excessive daytime sleepiness, falling asleep while reading, watching television, feels sleepy during the day as well. Zana denies any history of seizures, sleep walking or talking and there is no history suggestive of bruxism or restless leg syndrome.    Bedtime Narative  He goes to bed at 8-9P and wakes up at 4-5A. Zana reports that this is  different

## 2024-02-05 NOTE — TELEPHONE ENCOUNTER
Patient called stating that he has an appt today, but would like to come in at around 3pm due to work. I tried to connect him the Pulmonary office but received the \"on call\" greeting and unable to leave a message. Please call patient back to let him know if he can arrive at around 3pm today - [phone].   Thank you

## 2024-02-06 ENCOUNTER — TELEPHONE (OUTPATIENT)
Dept: SLEEP CENTER | Age: 29
End: 2024-02-06

## 2024-02-21 DIAGNOSIS — F90.9 ATTENTION DEFICIT HYPERACTIVITY DISORDER (ADHD), UNSPECIFIED ADHD TYPE: ICD-10-CM

## 2024-02-21 RX ORDER — LISDEXAMFETAMINE DIMESYLATE CAPSULES 30 MG/1
30 CAPSULE ORAL EVERY MORNING
Qty: 30 CAPSULE | Refills: 0 | OUTPATIENT
Start: 2024-02-21 | End: 2024-03-22

## 2024-02-24 SDOH — ECONOMIC STABILITY: FOOD INSECURITY: WITHIN THE PAST 12 MONTHS, THE FOOD YOU BOUGHT JUST DIDN'T LAST AND YOU DIDN'T HAVE MONEY TO GET MORE.: NEVER TRUE

## 2024-02-24 SDOH — ECONOMIC STABILITY: INCOME INSECURITY: HOW HARD IS IT FOR YOU TO PAY FOR THE VERY BASICS LIKE FOOD, HOUSING, MEDICAL CARE, AND HEATING?: SOMEWHAT HARD

## 2024-02-24 SDOH — ECONOMIC STABILITY: FOOD INSECURITY: WITHIN THE PAST 12 MONTHS, YOU WORRIED THAT YOUR FOOD WOULD RUN OUT BEFORE YOU GOT MONEY TO BUY MORE.: NEVER TRUE

## 2024-02-27 ENCOUNTER — TELEMEDICINE (OUTPATIENT)
Dept: FAMILY MEDICINE CLINIC | Age: 29
End: 2024-02-27
Payer: COMMERCIAL

## 2024-02-27 DIAGNOSIS — F90.9 ATTENTION DEFICIT HYPERACTIVITY DISORDER (ADHD), UNSPECIFIED ADHD TYPE: Primary | ICD-10-CM

## 2024-02-27 DIAGNOSIS — S49.91XA INJURY OF RIGHT SHOULDER, INITIAL ENCOUNTER: ICD-10-CM

## 2024-02-27 PROCEDURE — G8484 FLU IMMUNIZE NO ADMIN: HCPCS

## 2024-02-27 PROCEDURE — 1036F TOBACCO NON-USER: CPT

## 2024-02-27 PROCEDURE — G8417 CALC BMI ABV UP PARAM F/U: HCPCS

## 2024-02-27 PROCEDURE — G8427 DOCREV CUR MEDS BY ELIG CLIN: HCPCS

## 2024-02-27 PROCEDURE — 99213 OFFICE O/P EST LOW 20 MIN: CPT

## 2024-02-27 RX ORDER — LISDEXAMFETAMINE DIMESYLATE CAPSULES 40 MG/1
40 CAPSULE ORAL DAILY
Qty: 30 CAPSULE | Refills: 0 | Status: SHIPPED | OUTPATIENT
Start: 2024-02-27 | End: 2024-03-28

## 2024-02-27 ASSESSMENT — ENCOUNTER SYMPTOMS
CONSTIPATION: 0
RHINORRHEA: 0
COUGH: 0
DIARRHEA: 0
SHORTNESS OF BREATH: 0
WHEEZING: 0
ABDOMINAL PAIN: 0
SORE THROAT: 0
NAUSEA: 0

## 2024-02-27 NOTE — PROGRESS NOTES
trying to take it easy. He has normal ROM but just has pain with certain movements. He injured both shoulders in past and has followed with ortho. Previously shoulder sx with right and left shoulder.     Review of Systems   Constitutional:  Negative for chills, fatigue (improved with TRT) and fever.   HENT:  Negative for congestion, rhinorrhea and sore throat.    Respiratory:  Negative for cough, shortness of breath and wheezing.    Cardiovascular:  Negative for chest pain and palpitations.   Gastrointestinal:  Negative for abdominal pain, constipation, diarrhea and nausea.   Genitourinary:  Negative for dysuria and hematuria.   Musculoskeletal:  Positive for arthralgias and joint swelling. Negative for myalgias.   Neurological:  Negative for dizziness and headaches.   Psychiatric/Behavioral:  Positive for decreased concentration. Negative for self-injury, sleep disturbance and suicidal ideas. The patient is hyperactive. The patient is not nervous/anxious.            2/24/2024    10:01 PM   Patient-Reported Vitals   Patient-Reported Weight 269   Patient-Reported Height 6'1   Patient-Reported Temperature 98.8        Physical Exam  Constitutional:       General: He is not in acute distress.     Appearance: Normal appearance.   HENT:      Head: Normocephalic and atraumatic.   Eyes:      General: No scleral icterus.  Pulmonary:      Effort: Pulmonary effort is normal. No respiratory distress.   Musculoskeletal:      Right shoulder: No swelling. Normal range of motion.   Skin:     Coloration: Skin is not jaundiced.   Neurological:      Mental Status: He is alert and oriented to person, place, and time.   Psychiatric:         Mood and Affect: Mood normal.         Speech: Speech is rapid and pressured.         Behavior: Behavior normal.         Thought Content: Thought content normal.         Judgment: Judgment normal.         There were no vitals filed for this visit.    Past Medical History:   Diagnosis Date    ADHD

## 2024-03-26 DIAGNOSIS — F90.9 ATTENTION DEFICIT HYPERACTIVITY DISORDER (ADHD), UNSPECIFIED ADHD TYPE: ICD-10-CM

## 2024-03-26 RX ORDER — LISDEXAMFETAMINE DIMESYLATE 50 MG/1
50 CAPSULE ORAL DAILY
Qty: 30 CAPSULE | Refills: 0 | Status: SHIPPED | OUTPATIENT
Start: 2024-03-26 | End: 2024-04-28 | Stop reason: SDUPTHER

## 2024-03-26 RX ORDER — LISDEXAMFETAMINE DIMESYLATE 40 MG/1
40 CAPSULE ORAL DAILY
Qty: 30 CAPSULE | Refills: 0 | Status: CANCELLED | OUTPATIENT
Start: 2024-03-26 | End: 2024-04-25

## 2024-03-26 NOTE — TELEPHONE ENCOUNTER
Last appointment this department: 2/27/2024  Next appointment this department: Visit date not found    Patient is asking for a dose increase on Vyvanse. He is on Vyvanse 40 mg and is asking for it to be increased to 50 mg.

## 2024-03-27 ENCOUNTER — HOSPITAL ENCOUNTER (OUTPATIENT)
Dept: SLEEP CENTER | Age: 29
Discharge: HOME | End: 2024-03-29
Payer: COMMERCIAL

## 2024-03-27 DIAGNOSIS — R06.81 WITNESSED EPISODE OF APNEA: ICD-10-CM

## 2024-03-27 DIAGNOSIS — R06.83 LOUD SNORING: ICD-10-CM

## 2024-03-27 PROCEDURE — 95806 SLEEP STUDY UNATT&RESP EFFT: CPT

## 2024-03-27 NOTE — TELEPHONE ENCOUNTER
Last appointment this department: 2/27/2024  Next appointment this department: Visit date not found

## 2024-03-27 NOTE — PROGRESS NOTES
Zana presents today for a HST instruction and demonstration on unit # 8371. Questions were asked and answers given.  He was able to return demonstration and verbalized understanding.  The sleep center control room phone number was provided in case questions arise during the study. Informed patient to call 911 in case of an emergency.   He states he will return the unit tomorrow before 1000.                       Title:  Home Sleep Apnea Testing (HSAT)     Approved by:  Darron Fisher MD        Approval Date:   March, 2024 Next Review:   March, 2026       Responsible Party:  Oanh Bassett  Institution/Entities Applies to:    [facility]    Policy Number:  None      Document Type:  Such as Guideline, Policy,   Policy & Procedure, or Procedure, Instructions   Manual:  Policy and Procedures     Section: IV  Policy Start Date: June, 2011       HOME SLEEP APNEA TESTING (HSAT)      PURPOSE: To ensure home sleep apnea testing (HSAT) conducted by the sleep facility adheres to the current AASM practice parameters, clinical practice guidelines, best practice and clinical guidelines in regard to the diagnosis of GEOVANY in adults.       POLICY:      HSAT is a method of recording certain parameters which will target and measure, minimally, heart rate, oxygen saturation, respiratory airflow, respiratory effort and snoring for the purpose of evaluating a patient for GEOVANY.       HSAT will be performed in conjunction with a comprehensive sleep evaluation by an appropriately licensed sleep facility medical staff member. All portable monitoring equipment will be FDA-approved and appropriately maintained to ensure patient safety and efficiency of the test.       PROCEDURE:      An order from a licensed sleep physician along with appropriate medical history documenting the indication for HSAT that complies with the AASM practice parameters.    All tests will be performed, and records will be

## 2024-03-29 ENCOUNTER — TELEPHONE (OUTPATIENT)
Dept: PULMONOLOGY | Age: 29
End: 2024-03-29

## 2024-03-29 NOTE — TELEPHONE ENCOUNTER
Patient is coming in Monday April 1st for his HST results. Could the raw data be scanned into patient's media please? It says 'comp' in the appt desk and I am guessing that the study has just not been read yet. Please let me know thanks!

## 2024-03-29 NOTE — PROGRESS NOTES
[facility] for Pulmonary, CriticalCare and Sleep Medicine    Zana Bailey, 28 y.o.  [id number]    Patient of mine    Assessment/Plan   1. Excessive daytime sleepiness  2. Obesity (BMI 30-39.9)       -Patient's recent sleep study was reviewed personally and results were discussed with patient at length. Criteria for GEOVANY diagnosis explained and understanding was verbalized by the patient. Patient was informed that he does not have GEOVANY based on results from sleep study.  -Following review of the test, we discussed positional therapy to decrease associated symptoms.   -Encouraged good sleep hygiene  -Encouraged to f/u with PCP for additional w/u of daytime fatigue with underlying ADHD and caffeine use.   -Encouraged weight loss       Return if symptoms worsen or fail to improve.       Subjective   Interval History       Zana Bailey is a 28 y.o. old male who comes in to review the results of his recent sleep study, to answer questions and to explore options for treatment.    Symptoms at time of initial visit included snoring, choking, periods of not breathing, tossing and turning, excessive daytime sleepiness, falling asleep while reading, watching television, feels sleepy during the day as well.   Patient continues to have symptoms of snoring, tossing and turning, excessive daytime sleepiness.  He reports that he is still sleepy when waking up but takes his ADHD medication and improves significantly. Still drinking copious amounts of caffeine during the day. He reports that he is getting a fair amount of sleep. He is also taking various OTC sleep medications/supplements.       Study Results  Initial Study Date -  3/27/24  AHI -  1.0    TotalEvents - 8  (Apneas  0  Hypopneas 8  Central  0)  Time with Sats below 88% - 0 min    Diagnosis: No clinically significant obstructive sleep apnea. Obstructive hypopneas were scored using the AASM scoring desaturation rule 1B, 4%. The patient noted to have soft snoring during the

## 2024-04-01 ENCOUNTER — OFFICE VISIT (OUTPATIENT)
Dept: PULMONOLOGY | Age: 29
End: 2024-04-01

## 2024-04-01 VITALS
SYSTOLIC BLOOD PRESSURE: 132 MMHG | TEMPERATURE: 98.8 F | WEIGHT: 267.8 LBS | DIASTOLIC BLOOD PRESSURE: 86 MMHG | HEART RATE: 90 BPM | OXYGEN SATURATION: 98 % | HEIGHT: 73 IN | BODY MASS INDEX: 35.49 KG/M2

## 2024-04-01 DIAGNOSIS — G47.19 EXCESSIVE DAYTIME SLEEPINESS: Primary | ICD-10-CM

## 2024-04-01 DIAGNOSIS — E66.9 OBESITY (BMI 30-39.9): ICD-10-CM

## 2024-04-01 ASSESSMENT — ENCOUNTER SYMPTOMS
SORE THROAT: 0
COUGH: 0
SHORTNESS OF BREATH: 0
WHEEZING: 0
RHINORRHEA: 0

## 2024-04-28 DIAGNOSIS — F90.9 ATTENTION DEFICIT HYPERACTIVITY DISORDER (ADHD), UNSPECIFIED ADHD TYPE: ICD-10-CM

## 2024-04-30 ENCOUNTER — PATIENT MESSAGE (OUTPATIENT)
Dept: FAMILY MEDICINE CLINIC | Age: 29
End: 2024-04-30

## 2024-04-30 RX ORDER — LISDEXAMFETAMINE DIMESYLATE 50 MG/1
50 CAPSULE ORAL DAILY
Qty: 30 CAPSULE | Refills: 0 | Status: SHIPPED | OUTPATIENT
Start: 2024-04-30 | End: 2024-05-30

## 2024-04-30 NOTE — TELEPHONE ENCOUNTER
From: Zana Bailey  Sent: 4/29/2024 5:38 PM EDT  To: [facility]  Staff  Subject: Appointment Request    Thank you. I didnt see an option to make it an E visit is there anyway I can change the appointment Friday from on site to electronic? Thank you.

## 2024-05-03 ENCOUNTER — PATIENT MESSAGE (OUTPATIENT)
Dept: FAMILY MEDICINE CLINIC | Age: 29
End: 2024-05-03

## 2024-05-03 ENCOUNTER — TELEMEDICINE (OUTPATIENT)
Dept: FAMILY MEDICINE CLINIC | Age: 29
End: 2024-05-03
Payer: COMMERCIAL

## 2024-05-03 DIAGNOSIS — F90.9 ATTENTION DEFICIT HYPERACTIVITY DISORDER (ADHD), UNSPECIFIED ADHD TYPE: Primary | ICD-10-CM

## 2024-05-03 PROCEDURE — G8427 DOCREV CUR MEDS BY ELIG CLIN: HCPCS

## 2024-05-03 PROCEDURE — 1036F TOBACCO NON-USER: CPT

## 2024-05-03 PROCEDURE — 99213 OFFICE O/P EST LOW 20 MIN: CPT

## 2024-05-03 PROCEDURE — G8417 CALC BMI ABV UP PARAM F/U: HCPCS

## 2024-05-03 NOTE — PROGRESS NOTES
Psychiatric/Behavioral:  Negative for decreased concentration and sleep disturbance. The patient is nervous/anxious.            2/24/2024    10:01 PM   Patient-Reported Vitals   Patient-Reported Weight 269   Patient-Reported Height 6'1   Patient-Reported Temperature 98.8        Physical Exam  Constitutional:       General: He is not in acute distress.     Appearance: Normal appearance.   HENT:      Head: Normocephalic and atraumatic.   Eyes:      General: No scleral icterus.  Pulmonary:      Effort: Pulmonary effort is normal. No respiratory distress.   Musculoskeletal:      Right shoulder: No swelling. Normal range of motion.   Skin:     Coloration: Skin is not jaundiced.   Neurological:      Mental Status: He is alert and oriented to person, place, and time.   Psychiatric:         Mood and Affect: Mood normal.         Speech: Speech normal. Speech is not rapid and pressured.         Behavior: Behavior normal.         Thought Content: Thought content normal.         Judgment: Judgment normal.         There were no vitals filed for this visit.    Past Medical History:   Diagnosis Date    ADHD (attention deficit hyperactivity disorder)     Asthma     COVID         Past Surgical History:   Procedure Laterality Date    CLAVICLE SURGERY      HAND SURGERY Right     NOSE SURGERY Right     cautery    SHOULDER SURGERY      SINUS ENDOSCOPY Bilateral 9/28/2022    Septoplasty Submucosal Ablation of Left Inferior Turbinate Image Guided Nasal Endoscopy Partial Resection of Earnestine Bullosa Bilateral Middle Turbinates with Lysis of Synechiae Bilateral Maxillary Antrostomy performed by Zana Soler MD at [facility] OR    TONSILLECTOMY AND ADENOIDECTOMY          Social History     Socioeconomic History    Marital status: Single     Spouse name: Not on file    Number of children: Not on file    Years of education: Not on file    Highest education level: Not on file   Occupational History    Not on file   Tobacco Use    Smoking status:

## 2024-05-04 ASSESSMENT — ENCOUNTER SYMPTOMS
RHINORRHEA: 0
WHEEZING: 0
ABDOMINAL PAIN: 0
DIARRHEA: 0
NAUSEA: 0
CONSTIPATION: 0
SORE THROAT: 0
COUGH: 0
SHORTNESS OF BREATH: 0

## 2024-05-06 NOTE — TELEPHONE ENCOUNTER
From: Zana Bailey  To: Sriram Shabazz  Sent: 5/3/2024 3:08 PM EDT  Subject: Lowering Dosage    Is there any way I can lower the dosage back down to 40mg. Thank you

## 2024-05-07 RX ORDER — LISDEXAMFETAMINE DIMESYLATE 40 MG/1
40 CAPSULE ORAL DAILY
Qty: 30 CAPSULE | Refills: 0 | Status: SHIPPED | OUTPATIENT
Start: 2024-05-07 | End: 2024-06-06

## 2024-05-07 RX ORDER — LISDEXAMFETAMINE DIMESYLATE 40 MG/1
40 CAPSULE ORAL DAILY
Qty: 30 CAPSULE | Refills: 0 | Status: SHIPPED | OUTPATIENT
Start: 2024-06-06 | End: 2024-07-06

## 2024-05-07 RX ORDER — LISDEXAMFETAMINE DIMESYLATE 40 MG/1
40 CAPSULE ORAL DAILY
Qty: 30 CAPSULE | Refills: 0 | Status: SHIPPED | OUTPATIENT
Start: 2024-07-06 | End: 2024-08-05

## 2024-06-19 ENCOUNTER — HOSPITAL ENCOUNTER (OUTPATIENT)
Age: 29
Discharge: HOME | End: 2024-06-19
Payer: COMMERCIAL

## 2024-06-19 ENCOUNTER — OFFICE VISIT (OUTPATIENT)
Age: 29
End: 2024-06-19
Payer: COMMERCIAL

## 2024-06-19 VITALS
HEIGHT: 73 IN | BODY MASS INDEX: 33.48 KG/M2 | DIASTOLIC BLOOD PRESSURE: 82 MMHG | HEART RATE: 97 BPM | SYSTOLIC BLOOD PRESSURE: 142 MMHG | WEIGHT: 252.6 LBS

## 2024-06-19 DIAGNOSIS — R79.89 LOW TESTOSTERONE IN MALE: ICD-10-CM

## 2024-06-19 DIAGNOSIS — R94.6 ABNORMAL THYROID FUNCTION TEST: Primary | ICD-10-CM

## 2024-06-19 DIAGNOSIS — R94.6 ABNORMAL THYROID FUNCTION TEST: ICD-10-CM

## 2024-06-19 DIAGNOSIS — E22.1 HYPERPROLACTINEMIA (HCC): ICD-10-CM

## 2024-06-19 LAB
ANION GAP SERPL CALC-SCNC: 9 MEQ/L (ref 8–16)
BUN SERPL-MCNC: 18 MG/DL (ref 7–22)
CALCIUM SERPL-MCNC: 9.3 MG/DL (ref 8.5–10.5)
CHLORIDE SERPL-SCNC: 104 MEQ/L (ref 98–111)
CO2 SERPL-SCNC: 25 MEQ/L (ref 23–33)
CREAT SERPL-MCNC: 0.9 MG/DL (ref 0.4–1.2)
GFR SERPL CREATININE-BSD FRML MDRD: > 90 ML/MIN/1.73M2
GLUCOSE SERPL-MCNC: 95 MG/DL (ref 70–108)
POTASSIUM SERPL-SCNC: 4.3 MEQ/L (ref 3.5–5.2)
PROLACTIN SERPL-MCNC: 17.1 NG/ML
SODIUM SERPL-SCNC: 138 MEQ/L (ref 135–145)
T4 FREE SERPL-MCNC: 1.22 NG/DL (ref 0.93–1.68)
TSH SERPL DL<=0.005 MIU/L-ACNC: 3.59 UIU/ML (ref 0.4–4.2)

## 2024-06-19 PROCEDURE — 84443 ASSAY THYROID STIM HORMONE: CPT

## 2024-06-19 PROCEDURE — 80048 BASIC METABOLIC PNL TOTAL CA: CPT

## 2024-06-19 PROCEDURE — 86376 MICROSOMAL ANTIBODY EACH: CPT

## 2024-06-19 PROCEDURE — 99204 OFFICE O/P NEW MOD 45 MIN: CPT | Performed by: INTERNAL MEDICINE

## 2024-06-19 PROCEDURE — 36415 COLL VENOUS BLD VENIPUNCTURE: CPT

## 2024-06-19 PROCEDURE — 84439 ASSAY OF FREE THYROXINE: CPT

## 2024-06-19 PROCEDURE — 84146 ASSAY OF PROLACTIN: CPT

## 2024-06-19 PROCEDURE — G8427 DOCREV CUR MEDS BY ELIG CLIN: HCPCS | Performed by: INTERNAL MEDICINE

## 2024-06-19 PROCEDURE — G8417 CALC BMI ABV UP PARAM F/U: HCPCS | Performed by: INTERNAL MEDICINE

## 2024-06-19 PROCEDURE — 86800 THYROGLOBULIN ANTIBODY: CPT

## 2024-06-19 PROCEDURE — 1036F TOBACCO NON-USER: CPT | Performed by: INTERNAL MEDICINE

## 2024-06-19 RX ORDER — VITAMIN B COMPLEX
1 CAPSULE ORAL DAILY
COMMUNITY

## 2024-06-19 NOTE — PROGRESS NOTES
Expiration Date:   6/20/2025    TSH     Standing Status:   Future     Standing Expiration Date:   9/19/2024    T4, Free     Standing Status:   Future     Standing Expiration Date:   9/19/2024    Thyroid Peroxidase Antibody     Standing Status:   Future     Standing Expiration Date:   9/19/2024    Anti-Thyroglobulin Antibody     Standing Status:   Future     Standing Expiration Date:   9/19/2024    Prolactin     Standing Status:   Future     Standing Expiration Date:   9/19/2024    Basic Metabolic Panel     Standing Status:   Future     Standing Expiration Date:   9/19/2024           The risks and benefits of my recommendations, as well as other treatment options were discussed with the patient today. Questions were answered.  Follow up: 3 months and as needed.         Electronically signed by Jagdeep Dennis MD 6/19/2024 2:07 PM     **This report has been created using voice recognition software. It may   contain minor errors which are inherent in voice recognition technology.**

## 2024-06-22 LAB
THYROGLOBULIN ANTIBODY: 201 IU/ML (ref 0–40)
THYROPEROXIDASE AB SERPL IA-ACNC: 132 IU/ML (ref 0–25)

## 2024-06-22 NOTE — RESULT ENCOUNTER NOTE
Normal thyroid levels but the peroxidase and thyroglobulin antibodies are elevated.  Therefore thyroid levels need to be followed periodically.

## 2024-06-27 ENCOUNTER — HOSPITAL ENCOUNTER (OUTPATIENT)
Dept: ULTRASOUND IMAGING | Age: 29
Discharge: HOME | End: 2024-06-27
Attending: INTERNAL MEDICINE
Payer: COMMERCIAL

## 2024-06-27 DIAGNOSIS — R94.6 ABNORMAL THYROID FUNCTION TEST: ICD-10-CM

## 2024-06-27 PROCEDURE — 76536 US EXAM OF HEAD AND NECK: CPT

## 2024-07-07 ENCOUNTER — CLINICAL DOCUMENTATION (OUTPATIENT)
Age: 29
End: 2024-07-07

## 2024-07-07 DIAGNOSIS — E04.2 MULTINODULAR GOITER: Primary | ICD-10-CM

## 2024-07-08 ENCOUNTER — TELEPHONE (OUTPATIENT)
Age: 29
End: 2024-07-08

## 2024-07-08 NOTE — TELEPHONE ENCOUNTER
----- Message from Jagdeep Dennis MD sent at 7/7/2024  5:46 PM EDT -----  Ultrasound was reviewed.  I have ordered thyroid biopsy.  Please coordinate.

## 2024-07-08 NOTE — TELEPHONE ENCOUNTER
Called and left  for patient. Dr. Dennis ordered a biopsy. Need to know where the patient would like to go and get this done.

## 2024-07-09 DIAGNOSIS — F90.9 ATTENTION DEFICIT HYPERACTIVITY DISORDER (ADHD), UNSPECIFIED ADHD TYPE: ICD-10-CM

## 2024-07-09 RX ORDER — LISDEXAMFETAMINE DIMESYLATE 40 MG/1
40 CAPSULE ORAL DAILY
Qty: 30 CAPSULE | Refills: 0 | Status: SHIPPED | OUTPATIENT
Start: 2024-07-09 | End: 2024-08-08

## 2024-07-09 NOTE — TELEPHONE ENCOUNTER
Last appointment this department: 5/3/2024  Next appointment this department: Visit date not found

## 2024-08-01 ENCOUNTER — HOSPITAL ENCOUNTER (OUTPATIENT)
Dept: ULTRASOUND IMAGING | Age: 29
Discharge: HOME | End: 2024-08-01
Payer: COMMERCIAL

## 2024-08-01 DIAGNOSIS — E04.2 MULTINODULAR GOITER: ICD-10-CM

## 2024-08-01 PROCEDURE — 76942 ECHO GUIDE FOR BIOPSY: CPT

## 2024-08-01 NOTE — OP NOTE
Department of Radiology  Post Procedure Progress Note      Pre-Procedure Diagnosis:  thyroid nodules     Procedure Performed:  FNA right thyroid nodule     Anesthesia: local     Findings: successful    Immediate Complications:  None    Estimated Blood Loss: minimal    SEE DICTATED PROCEDURE NOTE FOR COMPLETE DETAILS.    Devin Melvin MD   8/1/2024 1:38 PM

## 2024-08-01 NOTE — H&P
Formulation and discussion of sedation / procedure plans, risks, benefits, side effects and alternatives with patient and/or responsible adult completed.    History and Physical reviewed and unchanged.    Electronically signed by Devin Melvin MD on 8/1/24 at 1:37 PM EDT

## 2024-08-14 DIAGNOSIS — F90.9 ATTENTION DEFICIT HYPERACTIVITY DISORDER (ADHD), UNSPECIFIED ADHD TYPE: ICD-10-CM

## 2024-08-15 RX ORDER — LISDEXAMFETAMINE DIMESYLATE 40 MG/1
40 CAPSULE ORAL DAILY
Qty: 30 CAPSULE | Refills: 0 | Status: SHIPPED | OUTPATIENT
Start: 2024-08-15 | End: 2024-08-15

## 2024-08-15 RX ORDER — LISDEXAMFETAMINE DIMESYLATE 40 MG/1
40 CAPSULE ORAL DAILY
Qty: 30 CAPSULE | Refills: 0 | Status: SHIPPED | OUTPATIENT
Start: 2024-08-15 | End: 2024-09-14

## 2024-08-16 ENCOUNTER — TELEPHONE (OUTPATIENT)
Age: 29
End: 2024-08-16

## 2024-08-16 ENCOUNTER — CLINICAL DOCUMENTATION (OUTPATIENT)
Age: 29
End: 2024-08-16

## 2024-09-04 ENCOUNTER — CLINICAL DOCUMENTATION (OUTPATIENT)
Age: 29
End: 2024-09-04

## 2024-09-05 ENCOUNTER — CLINICAL DOCUMENTATION (OUTPATIENT)
Age: 29
End: 2024-09-05

## 2024-09-05 DIAGNOSIS — R89.9 ABNORMAL THYROID BIOPSY: ICD-10-CM

## 2024-09-05 DIAGNOSIS — E04.2 MULTINODULAR GOITER: Primary | ICD-10-CM

## 2024-09-05 NOTE — PROGRESS NOTES
Afirma test is suspicious with a 50% risk of malignancy based on expression atlas.  Therefore I have recommended surgery for definitive management which the patient accepts.  I will give him a referral to ENT.

## 2024-09-20 DIAGNOSIS — F90.9 ATTENTION DEFICIT HYPERACTIVITY DISORDER (ADHD), UNSPECIFIED ADHD TYPE: ICD-10-CM

## 2024-09-23 RX ORDER — LISDEXAMFETAMINE DIMESYLATE 40 MG/1
40 CAPSULE ORAL DAILY
Qty: 30 CAPSULE | Refills: 0 | Status: SHIPPED | OUTPATIENT
Start: 2024-09-23 | End: 2024-10-23

## 2024-10-09 ENCOUNTER — OFFICE VISIT (OUTPATIENT)
Dept: FAMILY MEDICINE CLINIC | Age: 29
End: 2024-10-09
Payer: COMMERCIAL

## 2024-10-09 VITALS
DIASTOLIC BLOOD PRESSURE: 72 MMHG | SYSTOLIC BLOOD PRESSURE: 128 MMHG | OXYGEN SATURATION: 99 % | BODY MASS INDEX: 33.53 KG/M2 | RESPIRATION RATE: 18 BRPM | HEIGHT: 73 IN | HEART RATE: 98 BPM | WEIGHT: 253 LBS

## 2024-10-09 DIAGNOSIS — F90.9 ATTENTION DEFICIT HYPERACTIVITY DISORDER (ADHD), UNSPECIFIED ADHD TYPE: ICD-10-CM

## 2024-10-09 PROCEDURE — 99213 OFFICE O/P EST LOW 20 MIN: CPT

## 2024-10-09 PROCEDURE — G8417 CALC BMI ABV UP PARAM F/U: HCPCS

## 2024-10-09 PROCEDURE — G8427 DOCREV CUR MEDS BY ELIG CLIN: HCPCS

## 2024-10-09 PROCEDURE — G8484 FLU IMMUNIZE NO ADMIN: HCPCS

## 2024-10-09 PROCEDURE — 1036F TOBACCO NON-USER: CPT

## 2024-10-09 RX ORDER — LISDEXAMFETAMINE DIMESYLATE 40 MG/1
40 CAPSULE ORAL DAILY
Qty: 30 CAPSULE | Refills: 0 | Status: SHIPPED | OUTPATIENT
Start: 2024-10-09 | End: 2024-10-10 | Stop reason: SDUPTHER

## 2024-10-09 ASSESSMENT — ENCOUNTER SYMPTOMS
WHEEZING: 0
NAUSEA: 0
DIARRHEA: 0
COUGH: 0
ABDOMINAL PAIN: 1
SHORTNESS OF BREATH: 0
CHEST TIGHTNESS: 1
RHINORRHEA: 0
SORE THROAT: 0
CONSTIPATION: 0

## 2024-10-09 ASSESSMENT — PATIENT HEALTH QUESTIONNAIRE - PHQ9
1. LITTLE INTEREST OR PLEASURE IN DOING THINGS: NOT AT ALL
SUM OF ALL RESPONSES TO PHQ QUESTIONS 1-9: 0
SUM OF ALL RESPONSES TO PHQ QUESTIONS 1-9: 0
SUM OF ALL RESPONSES TO PHQ9 QUESTIONS 1 & 2: 0
2. FEELING DOWN, DEPRESSED OR HOPELESS: NOT AT ALL
SUM OF ALL RESPONSES TO PHQ QUESTIONS 1-9: 0
SUM OF ALL RESPONSES TO PHQ QUESTIONS 1-9: 0

## 2024-10-09 NOTE — ASSESSMENT & PLAN NOTE
Vyvanse brand name only.  ADHD well controlled while on medicaiton.  Monitor moods, appetite, sleep, and heart rate.  PDMP reviewed, NO concerns.   Patient will return left over generic medications to pharmacy for proper discard.

## 2024-10-10 DIAGNOSIS — F90.9 ATTENTION DEFICIT HYPERACTIVITY DISORDER (ADHD), UNSPECIFIED ADHD TYPE: ICD-10-CM

## 2024-10-10 RX ORDER — LISDEXAMFETAMINE DIMESYLATE 40 MG/1
40 CAPSULE ORAL DAILY
Qty: 30 CAPSULE | Refills: 0 | Status: SHIPPED | OUTPATIENT
Start: 2024-10-10 | End: 2024-11-09

## 2024-10-10 NOTE — TELEPHONE ENCOUNTER
Per patient script for Vyvanse needs to be MICHAEL. Corrected order. Needs sent to [facility].  10/9/2024  Visit date not found   Please advise.

## 2024-10-11 ENCOUNTER — PATIENT MESSAGE (OUTPATIENT)
Dept: FAMILY MEDICINE CLINIC | Age: 29
End: 2024-10-11

## 2024-10-11 RX ORDER — DOXYCYCLINE HYCLATE 100 MG
100 TABLET ORAL 2 TIMES DAILY
Qty: 20 TABLET | Refills: 0 | Status: SHIPPED | OUTPATIENT
Start: 2024-10-11 | End: 2024-10-21

## 2024-10-17 ENCOUNTER — PATIENT MESSAGE (OUTPATIENT)
Dept: FAMILY MEDICINE CLINIC | Age: 29
End: 2024-10-17

## 2024-10-17 DIAGNOSIS — F90.9 ATTENTION DEFICIT HYPERACTIVITY DISORDER (ADHD), UNSPECIFIED ADHD TYPE: Primary | ICD-10-CM

## 2024-10-17 RX ORDER — LISDEXAMFETAMINE DIMESYLATE 30 MG/1
30 CAPSULE ORAL DAILY
Qty: 30 CAPSULE | Refills: 0 | Status: SHIPPED | OUTPATIENT
Start: 2024-10-17 | End: 2024-11-16

## 2024-10-22 ENCOUNTER — PATIENT MESSAGE (OUTPATIENT)
Dept: FAMILY MEDICINE CLINIC | Age: 29
End: 2024-10-22

## 2024-10-22 DIAGNOSIS — F90.9 ATTENTION DEFICIT HYPERACTIVITY DISORDER (ADHD), UNSPECIFIED ADHD TYPE: ICD-10-CM

## 2024-10-22 RX ORDER — LISDEXAMFETAMINE DIMESYLATE 30 MG/1
30 CAPSULE ORAL DAILY
Qty: 30 CAPSULE | Refills: 0 | Status: SHIPPED | OUTPATIENT
Start: 2024-10-22 | End: 2024-11-21

## 2024-10-31 ENCOUNTER — OFFICE VISIT (OUTPATIENT)
Dept: ENT CLINIC | Age: 29
End: 2024-10-31

## 2024-10-31 VITALS
HEIGHT: 73 IN | RESPIRATION RATE: 16 BRPM | TEMPERATURE: 97.1 F | DIASTOLIC BLOOD PRESSURE: 78 MMHG | BODY MASS INDEX: 33.31 KG/M2 | OXYGEN SATURATION: 95 % | HEART RATE: 65 BPM | SYSTOLIC BLOOD PRESSURE: 120 MMHG | WEIGHT: 251.3 LBS

## 2024-10-31 DIAGNOSIS — E04.1 RIGHT THYROID NODULE: ICD-10-CM

## 2024-10-31 DIAGNOSIS — E04.2 MULTINODULAR GOITER: Primary | ICD-10-CM

## 2024-10-31 PROCEDURE — 99214 OFFICE O/P EST MOD 30 MIN: CPT | Performed by: OTOLARYNGOLOGY

## 2024-10-31 NOTE — PROGRESS NOTES
benign or malignant.  If it were malignant, it might be necessary to remove the other side as well, to permit effective radioactive iodine treatment.    Plan for right thyroid lobectomy with NIM monitoring.    Thyroidectomy, informed consent:  The risks and benefits of the procedure, including but not limited to risk of anesthesia, bleeding, infection, hoarseness, hypocalcemia, and potential need for further surgery or treatment were discussed with the patient. The role of the parathyroid glands and the proximity of the recurrent laryngeal nerve were discussed. The possible consequences of not undergoing the procedures were discussed, along with any alternative treatments. They understand that no guarantees are made regarding either outcome or potential complications. All of their questions were answered. They read the patient information sheet and were given a copy to take with them. They requested we proceed.        Zana Soler MD    **This report has been created using voice recognition software. It may contain minor errors which are inherent in voicerecognition technology.**

## 2024-11-01 ENCOUNTER — PREP FOR PROCEDURE (OUTPATIENT)
Dept: ENT CLINIC | Age: 29
End: 2024-11-01

## 2024-11-01 DIAGNOSIS — E04.2 MULTINODULAR GOITER: ICD-10-CM

## 2024-11-04 ENCOUNTER — TELEPHONE (OUTPATIENT)
Dept: ENT CLINIC | Age: 29
End: 2024-11-04

## 2024-11-04 NOTE — TELEPHONE ENCOUNTER
Vm full. 12/30 is not going to work for surgery day, per surgery dept not enough time. Can offer 12/04.

## 2024-11-05 NOTE — TELEPHONE ENCOUNTER
Patient called back and lvm stating he should have buckeye now. It is not active. I tried to call patient back and again, vm is full.

## 2024-11-05 NOTE — TELEPHONE ENCOUNTER
Patient called back and we are scheduled for 12/02.  He just spoke with medicaid yesterday, so likely just not updated yet.

## 2024-11-06 ENCOUNTER — PATIENT MESSAGE (OUTPATIENT)
Dept: ENT CLINIC | Age: 29
End: 2024-11-06

## 2024-11-18 ENCOUNTER — PREP FOR PROCEDURE (OUTPATIENT)
Dept: ENT CLINIC | Age: 29
End: 2024-11-18

## 2024-11-18 DIAGNOSIS — E04.2 MULTINODULAR GOITER: Primary | ICD-10-CM

## 2024-11-19 DIAGNOSIS — F90.9 ATTENTION DEFICIT HYPERACTIVITY DISORDER (ADHD), UNSPECIFIED ADHD TYPE: ICD-10-CM

## 2024-11-19 RX ORDER — LISDEXAMFETAMINE DIMESYLATE 30 MG/1
30 CAPSULE ORAL DAILY
Qty: 30 CAPSULE | Refills: 0 | Status: CANCELLED | OUTPATIENT
Start: 2024-11-19 | End: 2024-12-19

## 2024-11-19 RX ORDER — LISDEXAMFETAMINE DIMESYLATE 30 MG/1
30 CAPSULE ORAL EVERY MORNING
Qty: 30 CAPSULE | Refills: 0 | Status: SHIPPED | OUTPATIENT
Start: 2024-11-22 | End: 2024-12-23 | Stop reason: SDUPTHER

## 2024-11-19 NOTE — TELEPHONE ENCOUNTER
This medication refill is regarding a MyChart request.  Refill requested by patient.    Requested Prescriptions     Pending Prescriptions Disp Refills    lisdexamfetamine (VYVANSE) 30 MG capsule 30 capsule 0     Sig: Take 1 capsule by mouth daily for 30 days. Max Daily Amount: 30 mg       Date of last visit: 10/9/2024  Date of next visit: Visit date not found  Date of last refill:   Pharmacy Name: [facility]    Last Lipid Panel:  No results found for: \"CHOL\", \"TRIG\", \"HDL\"  Last CMP:   Lab Results   Component Value Date     06/19/2024    K 4.3 06/19/2024     06/19/2024    CO2 25 06/19/2024    BUN 18 06/19/2024    CREATININE 0.9 06/19/2024    GLUCOSE 95 06/19/2024    CALCIUM 9.3 06/19/2024    BILITOT 0.6 11/11/2021    ALKPHOS 47 11/11/2021    AST 47 (H) 11/11/2021    ALT 50 11/11/2021    LABGLOM > 90 06/19/2024       Last Thyroid:    Lab Results   Component Value Date    TSH 3.590 06/19/2024    V2POWRG 111 11/02/2023    T4FREE 1.22 06/19/2024     Last Hemoglobin A1C:    Lab Results   Component Value Date/Time    LABA1C 5.3 11/13/2023 01:48 PM       Rx verified, ordered and set to EP.

## 2024-11-22 ENCOUNTER — TELEMEDICINE (OUTPATIENT)
Dept: FAMILY MEDICINE CLINIC | Age: 29
End: 2024-11-22
Payer: COMMERCIAL

## 2024-11-22 DIAGNOSIS — E04.2 MULTINODULAR GOITER: ICD-10-CM

## 2024-11-22 DIAGNOSIS — J40 BRONCHITIS: Primary | ICD-10-CM

## 2024-11-22 DIAGNOSIS — F90.9 ATTENTION DEFICIT HYPERACTIVITY DISORDER (ADHD), UNSPECIFIED ADHD TYPE: ICD-10-CM

## 2024-11-22 PROCEDURE — 1036F TOBACCO NON-USER: CPT

## 2024-11-22 PROCEDURE — G8484 FLU IMMUNIZE NO ADMIN: HCPCS

## 2024-11-22 PROCEDURE — 99214 OFFICE O/P EST MOD 30 MIN: CPT

## 2024-11-22 PROCEDURE — G8417 CALC BMI ABV UP PARAM F/U: HCPCS

## 2024-11-22 PROCEDURE — G8428 CUR MEDS NOT DOCUMENT: HCPCS

## 2024-11-22 RX ORDER — AZITHROMYCIN 250 MG/1
TABLET, FILM COATED ORAL
Qty: 6 TABLET | Refills: 0 | Status: SHIPPED | OUTPATIENT
Start: 2024-11-22 | End: 2024-12-02

## 2024-11-22 ASSESSMENT — ENCOUNTER SYMPTOMS
NAUSEA: 0
WHEEZING: 1
CHEST TIGHTNESS: 1
CONSTIPATION: 0
ABDOMINAL PAIN: 0
SHORTNESS OF BREATH: 1
COLOR CHANGE: 0
BACK PAIN: 0
VOMITING: 0
SORE THROAT: 0
COUGH: 1
STRIDOR: 0
SINUS PRESSURE: 0
ABDOMINAL DISTENTION: 0
DIARRHEA: 0

## 2024-11-22 NOTE — PROGRESS NOTES
Zana Bailey (:  1995) is a 29 y.o. male, Established patient, here for evaluation of the following chief complaint(s):  Cough         Assessment & Plan  1. Multinodular Goiter  Follows with ENDO and ENT. Upcoming thyroidectomy.     2. Bronchitis  Zpak sent in.   Reviewed OTC medications and at home symptomatic management.   Encouraged rest and increase in oral intake of fluids.  Educated on signs of worsening condition to monitor for and when to follow up.     3. ADHD  Vyvanse helps with ADHD symptoms dramatically. On 30 mg. Does not tolerate generic. On current holiday but will restart once work starts up soon. Will not take on days off.     Discussed use, benefit, and side effects of prescribed medications.  Barriers to medication compliance addressed.  All patient questions answered.  Pt voiced understanding.     Results  Laboratory Studies  Thyroglobulin and another antibody were five times the high end of the reference range.    Testing  Biopsy of thyroid nodule showed it was more than likely cancerous.  1. Bronchitis  -     azithromycin (ZITHROMAX) 250 MG tablet; 500mg on day 1 followed by 250mg on days 2 - 5, Disp-6 tablet, R-0Normal  2. Multinodular goiter  3. Attention deficit hyperactivity disorder (ADHD), unspecified ADHD type    Return in about 3 months (around 2025).       Subjective   History of Present Illness  The patient presents for evaluation of multiple medical concerns.    He is scheduled for surgery on 2024 to remove a nodule from the right side of his thyroid. The extent of the thyroid removal will be determined during the procedure. His surgeon, Dr. Znaa Soler, previously performed his turboplasty and septoplasty. A biopsy of the nodule was performed 4 to 5 months ago and sent to [facility].    He has been in contact with endo, who diagnosed him with Hashimoto's disease.     ADHD follow up;  He has been on a drug holiday and has noticed an increase in

## 2024-11-27 ENCOUNTER — PATIENT MESSAGE (OUTPATIENT)
Dept: FAMILY MEDICINE CLINIC | Age: 29
End: 2024-11-27

## 2024-11-27 DIAGNOSIS — J40 BRONCHITIS: Primary | ICD-10-CM

## 2024-11-27 RX ORDER — DOXYCYCLINE HYCLATE 100 MG
100 TABLET ORAL 2 TIMES DAILY
Qty: 20 TABLET | Refills: 0 | Status: SHIPPED | OUTPATIENT
Start: 2024-11-27 | End: 2024-12-07

## 2024-12-01 RX ORDER — SODIUM CHLORIDE 0.9 % (FLUSH) 0.9 %
5-40 SYRINGE (ML) INJECTION PRN
Status: CANCELLED | OUTPATIENT
Start: 2024-12-01

## 2024-12-01 RX ORDER — SODIUM CHLORIDE 0.9 % (FLUSH) 0.9 %
5-40 SYRINGE (ML) INJECTION EVERY 12 HOURS SCHEDULED
Status: CANCELLED | OUTPATIENT
Start: 2024-12-01

## 2024-12-01 RX ORDER — SODIUM CHLORIDE 9 MG/ML
INJECTION, SOLUTION INTRAVENOUS PRN
Status: CANCELLED | OUTPATIENT
Start: 2024-12-01

## 2024-12-02 ENCOUNTER — ANESTHESIA (OUTPATIENT)
Dept: OPERATING ROOM | Age: 29
End: 2024-12-02
Payer: COMMERCIAL

## 2024-12-02 ENCOUNTER — HOSPITAL ENCOUNTER (OUTPATIENT)
Age: 29
Discharge: HOME | End: 2024-12-03
Attending: OTOLARYNGOLOGY | Admitting: OTOLARYNGOLOGY
Payer: COMMERCIAL

## 2024-12-02 ENCOUNTER — ANESTHESIA EVENT (OUTPATIENT)
Dept: OPERATING ROOM | Age: 29
End: 2024-12-02
Payer: COMMERCIAL

## 2024-12-02 DIAGNOSIS — G89.18 ACUTE POST-OPERATIVE PAIN: Primary | ICD-10-CM

## 2024-12-02 DIAGNOSIS — E89.0 S/P PARTIAL THYROIDECTOMY: ICD-10-CM

## 2024-12-02 DIAGNOSIS — E04.2 MULTINODULAR GOITER: ICD-10-CM

## 2024-12-02 LAB — PTH-INTACT SERPL-MCNC: 17.7 PG/ML (ref 15–65)

## 2024-12-02 PROCEDURE — 7100000001 HC PACU RECOVERY - ADDTL 15 MIN: Performed by: OTOLARYNGOLOGY

## 2024-12-02 PROCEDURE — 6360000002 HC RX W HCPCS

## 2024-12-02 PROCEDURE — 88305 TISSUE EXAM BY PATHOLOGIST: CPT

## 2024-12-02 PROCEDURE — 7100000010 HC PHASE II RECOVERY - FIRST 15 MIN: Performed by: OTOLARYNGOLOGY

## 2024-12-02 PROCEDURE — 3700000001 HC ADD 15 MINUTES (ANESTHESIA): Performed by: OTOLARYNGOLOGY

## 2024-12-02 PROCEDURE — 3700000000 HC ANESTHESIA ATTENDED CARE: Performed by: OTOLARYNGOLOGY

## 2024-12-02 PROCEDURE — 7100000011 HC PHASE II RECOVERY - ADDTL 15 MIN: Performed by: OTOLARYNGOLOGY

## 2024-12-02 PROCEDURE — 2720000010 HC SURG SUPPLY STERILE: Performed by: OTOLARYNGOLOGY

## 2024-12-02 PROCEDURE — 6370000000 HC RX 637 (ALT 250 FOR IP): Performed by: OTOLARYNGOLOGY

## 2024-12-02 PROCEDURE — 3600000013 HC SURGERY LEVEL 3 ADDTL 15MIN: Performed by: OTOLARYNGOLOGY

## 2024-12-02 PROCEDURE — 83970 ASSAY OF PARATHORMONE: CPT

## 2024-12-02 PROCEDURE — 36415 COLL VENOUS BLD VENIPUNCTURE: CPT

## 2024-12-02 PROCEDURE — 60220 PARTIAL REMOVAL OF THYROID: CPT | Performed by: OTOLARYNGOLOGY

## 2024-12-02 PROCEDURE — 3600000003 HC SURGERY LEVEL 3 BASE: Performed by: OTOLARYNGOLOGY

## 2024-12-02 PROCEDURE — 88331 PATH CONSLTJ SURG 1 BLK 1SPC: CPT

## 2024-12-02 PROCEDURE — 7100000000 HC PACU RECOVERY - FIRST 15 MIN: Performed by: OTOLARYNGOLOGY

## 2024-12-02 PROCEDURE — 2580000003 HC RX 258: Performed by: OTOLARYNGOLOGY

## 2024-12-02 PROCEDURE — 6360000002 HC RX W HCPCS: Performed by: OTOLARYNGOLOGY

## 2024-12-02 PROCEDURE — 2500000003 HC RX 250 WO HCPCS

## 2024-12-02 PROCEDURE — 2709999900 HC NON-CHARGEABLE SUPPLY: Performed by: OTOLARYNGOLOGY

## 2024-12-02 PROCEDURE — 88307 TISSUE EXAM BY PATHOLOGIST: CPT

## 2024-12-02 RX ORDER — SODIUM CHLORIDE 9 MG/ML
INJECTION, SOLUTION INTRAVENOUS PRN
Status: DISCONTINUED | OUTPATIENT
Start: 2024-12-02 | End: 2024-12-02 | Stop reason: HOSPADM

## 2024-12-02 RX ORDER — GINSENG 100 MG
CAPSULE ORAL PRN
Status: DISCONTINUED | OUTPATIENT
Start: 2024-12-02 | End: 2024-12-02 | Stop reason: ALTCHOICE

## 2024-12-02 RX ORDER — SODIUM CHLORIDE 9 MG/ML
INJECTION, SOLUTION INTRAVENOUS CONTINUOUS
Status: DISCONTINUED | OUTPATIENT
Start: 2024-12-02 | End: 2024-12-03 | Stop reason: HOSPADM

## 2024-12-02 RX ORDER — ALBUTEROL SULFATE 90 UG/1
2 INHALANT RESPIRATORY (INHALATION) EVERY 6 HOURS PRN
Status: DISCONTINUED | OUTPATIENT
Start: 2024-12-02 | End: 2024-12-03 | Stop reason: HOSPADM

## 2024-12-02 RX ORDER — MIDAZOLAM HYDROCHLORIDE 1 MG/ML
INJECTION, SOLUTION INTRAMUSCULAR; INTRAVENOUS
Status: DISCONTINUED | OUTPATIENT
Start: 2024-12-02 | End: 2024-12-02 | Stop reason: SDUPTHER

## 2024-12-02 RX ORDER — LIDOCAINE HCL/PF 100 MG/5ML
SYRINGE (ML) INJECTION
Status: DISCONTINUED | OUTPATIENT
Start: 2024-12-02 | End: 2024-12-02 | Stop reason: SDUPTHER

## 2024-12-02 RX ORDER — SUCCINYLCHOLINE/SOD CL,ISO/PF 200MG/10ML
SYRINGE (ML) INTRAVENOUS
Status: DISCONTINUED | OUTPATIENT
Start: 2024-12-02 | End: 2024-12-02 | Stop reason: SDUPTHER

## 2024-12-02 RX ORDER — MORPHINE SULFATE 4 MG/ML
4 INJECTION, SOLUTION INTRAMUSCULAR; INTRAVENOUS
Status: DISCONTINUED | OUTPATIENT
Start: 2024-12-02 | End: 2024-12-03 | Stop reason: HOSPADM

## 2024-12-02 RX ORDER — PROPOFOL 10 MG/ML
INJECTION, EMULSION INTRAVENOUS
Status: DISCONTINUED | OUTPATIENT
Start: 2024-12-02 | End: 2024-12-02 | Stop reason: SDUPTHER

## 2024-12-02 RX ORDER — CETIRIZINE HYDROCHLORIDE 10 MG/1
10 TABLET ORAL DAILY
Status: DISCONTINUED | OUTPATIENT
Start: 2024-12-02 | End: 2024-12-03 | Stop reason: HOSPADM

## 2024-12-02 RX ORDER — SODIUM CHLORIDE 0.9 % (FLUSH) 0.9 %
5-40 SYRINGE (ML) INJECTION EVERY 12 HOURS SCHEDULED
Status: DISCONTINUED | OUTPATIENT
Start: 2024-12-02 | End: 2024-12-03 | Stop reason: HOSPADM

## 2024-12-02 RX ORDER — MORPHINE SULFATE 2 MG/ML
2 INJECTION, SOLUTION INTRAMUSCULAR; INTRAVENOUS
Status: DISCONTINUED | OUTPATIENT
Start: 2024-12-02 | End: 2024-12-03 | Stop reason: HOSPADM

## 2024-12-02 RX ORDER — LIDOCAINE HYDROCHLORIDE AND EPINEPHRINE 10; 10 MG/ML; UG/ML
INJECTION, SOLUTION INFILTRATION; PERINEURAL PRN
Status: DISCONTINUED | OUTPATIENT
Start: 2024-12-02 | End: 2024-12-02 | Stop reason: ALTCHOICE

## 2024-12-02 RX ORDER — GLYCOPYRROLATE 0.2 MG/ML
INJECTION INTRAMUSCULAR; INTRAVENOUS
Status: DISCONTINUED | OUTPATIENT
Start: 2024-12-02 | End: 2024-12-02 | Stop reason: SDUPTHER

## 2024-12-02 RX ORDER — HYDROMORPHONE HYDROCHLORIDE 2 MG/ML
INJECTION, SOLUTION INTRAMUSCULAR; INTRAVENOUS; SUBCUTANEOUS
Status: DISCONTINUED | OUTPATIENT
Start: 2024-12-02 | End: 2024-12-02 | Stop reason: SDUPTHER

## 2024-12-02 RX ORDER — HYDROCODONE BITARTRATE AND ACETAMINOPHEN 7.5; 325 MG/1; MG/1
1 TABLET ORAL ONCE
Status: COMPLETED | OUTPATIENT
Start: 2024-12-02 | End: 2024-12-02

## 2024-12-02 RX ORDER — ONDANSETRON 2 MG/ML
4 INJECTION INTRAMUSCULAR; INTRAVENOUS EVERY 6 HOURS PRN
Status: DISCONTINUED | OUTPATIENT
Start: 2024-12-02 | End: 2024-12-03 | Stop reason: HOSPADM

## 2024-12-02 RX ORDER — DEXAMETHASONE SODIUM PHOSPHATE 4 MG/ML
INJECTION, SOLUTION INTRA-ARTICULAR; INTRALESIONAL; INTRAMUSCULAR; INTRAVENOUS; SOFT TISSUE
Status: DISCONTINUED | OUTPATIENT
Start: 2024-12-02 | End: 2024-12-02 | Stop reason: SDUPTHER

## 2024-12-02 RX ORDER — ONDANSETRON 2 MG/ML
INJECTION INTRAMUSCULAR; INTRAVENOUS
Status: DISCONTINUED | OUTPATIENT
Start: 2024-12-02 | End: 2024-12-02 | Stop reason: SDUPTHER

## 2024-12-02 RX ORDER — FENTANYL CITRATE 50 UG/ML
INJECTION, SOLUTION INTRAMUSCULAR; INTRAVENOUS
Status: DISCONTINUED | OUTPATIENT
Start: 2024-12-02 | End: 2024-12-02 | Stop reason: SDUPTHER

## 2024-12-02 RX ORDER — SODIUM CHLORIDE 0.9 % (FLUSH) 0.9 %
5-40 SYRINGE (ML) INJECTION EVERY 12 HOURS SCHEDULED
Status: DISCONTINUED | OUTPATIENT
Start: 2024-12-02 | End: 2024-12-02 | Stop reason: HOSPADM

## 2024-12-02 RX ORDER — SODIUM CHLORIDE 0.9 % (FLUSH) 0.9 %
5-40 SYRINGE (ML) INJECTION PRN
Status: DISCONTINUED | OUTPATIENT
Start: 2024-12-02 | End: 2024-12-02 | Stop reason: HOSPADM

## 2024-12-02 RX ORDER — DOXYCYCLINE HYCLATE 100 MG
100 TABLET ORAL 2 TIMES DAILY
Status: DISCONTINUED | OUTPATIENT
Start: 2024-12-02 | End: 2024-12-03 | Stop reason: HOSPADM

## 2024-12-02 RX ORDER — SODIUM CHLORIDE 0.9 % (FLUSH) 0.9 %
5-40 SYRINGE (ML) INJECTION PRN
Status: DISCONTINUED | OUTPATIENT
Start: 2024-12-02 | End: 2024-12-03 | Stop reason: HOSPADM

## 2024-12-02 RX ORDER — HYDROCODONE BITARTRATE AND ACETAMINOPHEN 7.5; 325 MG/1; MG/1
1 TABLET ORAL EVERY 6 HOURS PRN
Status: DISCONTINUED | OUTPATIENT
Start: 2024-12-02 | End: 2024-12-03 | Stop reason: HOSPADM

## 2024-12-02 RX ORDER — ONDANSETRON 4 MG/1
4 TABLET, ORALLY DISINTEGRATING ORAL EVERY 8 HOURS PRN
Status: DISCONTINUED | OUTPATIENT
Start: 2024-12-02 | End: 2024-12-03 | Stop reason: HOSPADM

## 2024-12-02 RX ORDER — KETAMINE HYDROCHLORIDE 50 MG/ML
INJECTION, SOLUTION INTRAMUSCULAR; INTRAVENOUS
Status: DISCONTINUED | OUTPATIENT
Start: 2024-12-02 | End: 2024-12-02 | Stop reason: SDUPTHER

## 2024-12-02 RX ADMIN — SODIUM CHLORIDE, PRESERVATIVE FREE 10 ML: 5 INJECTION INTRAVENOUS at 22:12

## 2024-12-02 RX ADMIN — FENTANYL CITRATE 100 MCG: 50 INJECTION, SOLUTION INTRAMUSCULAR; INTRAVENOUS at 12:15

## 2024-12-02 RX ADMIN — HYDROCODONE BITARTRATE AND ACETAMINOPHEN 1 TABLET: 7.5; 325 TABLET ORAL at 17:52

## 2024-12-02 RX ADMIN — KETAMINE HYDROCHLORIDE 25 MG: 50 INJECTION, SOLUTION INTRAMUSCULAR; INTRAVENOUS at 13:11

## 2024-12-02 RX ADMIN — Medication 100 MG: at 12:15

## 2024-12-02 RX ADMIN — SODIUM CHLORIDE: 9 INJECTION, SOLUTION INTRAVENOUS at 19:51

## 2024-12-02 RX ADMIN — MORPHINE SULFATE 4 MG: 4 INJECTION, SOLUTION INTRAMUSCULAR; INTRAVENOUS at 22:11

## 2024-12-02 RX ADMIN — GLYCOPYRROLATE 0.2 MG: 0.2 INJECTION INTRAMUSCULAR; INTRAVENOUS at 15:35

## 2024-12-02 RX ADMIN — PROPOFOL 50 MCG/KG/MIN: 10 INJECTION, EMULSION INTRAVENOUS at 12:28

## 2024-12-02 RX ADMIN — PROPOFOL 50 MG: 10 INJECTION, EMULSION INTRAVENOUS at 12:50

## 2024-12-02 RX ADMIN — MIDAZOLAM 2 MG: 1 INJECTION INTRAMUSCULAR; INTRAVENOUS at 12:09

## 2024-12-02 RX ADMIN — SODIUM CHLORIDE 50 ML/HR: 9 INJECTION, SOLUTION INTRAVENOUS at 10:12

## 2024-12-02 RX ADMIN — ONDANSETRON 4 MG: 2 INJECTION INTRAMUSCULAR; INTRAVENOUS at 15:33

## 2024-12-02 RX ADMIN — HYDROMORPHONE HYDROCHLORIDE 1.2 MG: 2 INJECTION INTRAMUSCULAR; INTRAVENOUS; SUBCUTANEOUS at 15:07

## 2024-12-02 RX ADMIN — PROPOFOL 250 MG: 10 INJECTION, EMULSION INTRAVENOUS at 12:15

## 2024-12-02 RX ADMIN — Medication 180 MG: at 12:15

## 2024-12-02 RX ADMIN — DEXAMETHASONE SODIUM PHOSPHATE 10 MG: 4 INJECTION, SOLUTION INTRAMUSCULAR; INTRAVENOUS at 15:02

## 2024-12-02 RX ADMIN — KETAMINE HYDROCHLORIDE 25 MG: 50 INJECTION, SOLUTION INTRAMUSCULAR; INTRAVENOUS at 15:28

## 2024-12-02 RX ADMIN — FENTANYL CITRATE 100 MCG: 50 INJECTION, SOLUTION INTRAMUSCULAR; INTRAVENOUS at 13:02

## 2024-12-02 RX ADMIN — MORPHINE SULFATE 4 MG: 4 INJECTION, SOLUTION INTRAMUSCULAR; INTRAVENOUS at 19:48

## 2024-12-02 ASSESSMENT — PAIN - FUNCTIONAL ASSESSMENT
PAIN_FUNCTIONAL_ASSESSMENT: PREVENTS OR INTERFERES SOME ACTIVE ACTIVITIES AND ADLS
PAIN_FUNCTIONAL_ASSESSMENT: ADULT NONVERBAL PAIN SCALE (NPVS)
PAIN_FUNCTIONAL_ASSESSMENT: ACTIVITIES ARE NOT PREVENTED
PAIN_FUNCTIONAL_ASSESSMENT: PREVENTS OR INTERFERES SOME ACTIVE ACTIVITIES AND ADLS
PAIN_FUNCTIONAL_ASSESSMENT: 0-10

## 2024-12-02 ASSESSMENT — PAIN DESCRIPTION - LOCATION
LOCATION: THROAT

## 2024-12-02 ASSESSMENT — PAIN DESCRIPTION - ORIENTATION
ORIENTATION: MID
ORIENTATION: MID

## 2024-12-02 ASSESSMENT — PAIN SCALES - GENERAL
PAINLEVEL_OUTOF10: 6
PAINLEVEL_OUTOF10: 8
PAINLEVEL_OUTOF10: 4
PAINLEVEL_OUTOF10: 6
PAINLEVEL_OUTOF10: 7
PAINLEVEL_OUTOF10: 7

## 2024-12-02 ASSESSMENT — PAIN DESCRIPTION - DESCRIPTORS
DESCRIPTORS: ACHING
DESCRIPTORS: SORE
DESCRIPTORS: ACHING
DESCRIPTORS: SORE
DESCRIPTORS: DISCOMFORT;SORE

## 2024-12-02 ASSESSMENT — PAIN DESCRIPTION - PAIN TYPE: TYPE: SURGICAL PAIN

## 2024-12-02 ASSESSMENT — PAIN DESCRIPTION - ONSET: ONSET: ON-GOING

## 2024-12-02 ASSESSMENT — PAIN DESCRIPTION - FREQUENCY: FREQUENCY: CONTINUOUS

## 2024-12-02 NOTE — BRIEF OP NOTE
Brief Postoperative Note      Patient: Zana Bailey  YOB: 1995  MRN: [id number]    Date of Procedure: 12/2/2024    Pre-Op Diagnosis Codes:      * Multinodular goiter [E04.2]     * HASHIMOTOS THYROIDITIS    Post-Op Diagnosis: Same and PATH PENDING       Procedure(s):  RIGHT THYROID LOBECTOMY, THIRD PARTY RECURRENT NERVE MONITORING.    Surgeon(s):  Zana Soler MD    Assistant:  First Assistant: Mignon Ball RN    Anesthesia: General    Estimated Blood Loss (mL): less than 50     Complications: None    Specimens:   ID Type Source Tests Collected by Time Destination   A : Lymph Node inferior to right lower pole of Thyroid Tissue Thyroid SURGICAL PATHOLOGY Zana Soler MD 12/2/2024 1427    B : right thyroid lobe (small pyramabial lobe) Tissue Thyroid SURGICAL PATHOLOGY Zana Soler MD 12/2/2024 1525        Implants:  * No implants in log *      Drains:   Closed/Suction Drain Left Neck Bulb (Active)   Site Description Clean, dry & intact 12/02/24 1744   Dressing Status Clean, dry & intact 12/02/24 1744   Drainage Appearance Bloody 12/02/24 1744   Drain Status Compressed 12/02/24 1744       Findings: suspicious nodule right thyroid lobe. Small inferior lymph node frozen section and was a reactive lymph node. Right infant. Parathyroid preserved number 15 Richard drain placed. Recurrent laryngeal nerve responded to light stimulation at the end of the procedure.    Electronically signed by ZANA SOLER MD on 12/2/2024 at 5:54 PM

## 2024-12-02 NOTE — ANESTHESIA POSTPROCEDURE EVALUATION
Department of Anesthesiology  Postprocedure Note    Patient: Zana Bailey  MRN: [id number]  YOB: 1995  Date of evaluation: 12/2/2024    Procedure Summary       Date: 12/02/24 Room / Location: [facility] OR 01 / [facility] OR    Anesthesia Start: 1209 Anesthesia Stop: 1634    Procedure: THYROID LOBECTOMY (Right: Neck) Diagnosis:       Multinodular goiter      (Multinodular goiter [E04.2])    Surgeons: Zana Soler MD Responsible Provider: Gerardo Menendez MD    Anesthesia Type: general ASA Status: 2            Anesthesia Type: No value filed.    Jonathan Phase I: Jonathan Score: 8    Jonathan Phase II:      Anesthesia Post Evaluation    Patient location during evaluation: PACU  Patient participation: complete - patient participated  Level of consciousness: sleepy but conscious, responsive to verbal stimuli and responsive to light touch  Pain score: 3  Airway patency: patent  Nausea & Vomiting: no nausea and no vomiting  Cardiovascular status: hemodynamically stable and blood pressure returned to baseline  Respiratory status: spontaneous ventilation, acceptable and nasal cannula  Hydration status: stable  Pain management: adequate and satisfactory to patient    No notable events documented.

## 2024-12-02 NOTE — ANESTHESIA PRE PROCEDURE
Department of Anesthesiology  Preprocedure Note       Name:  Zana Bailey   Age:  29 y.o.  :  1995                                          MRN:  [id number]         Date:  2024      Surgeon: Surgeon(s):  Zana Soler MD    Procedure: Procedure(s):  THYROID LOBECTOMY    Medications prior to admission:   Prior to Admission medications    Medication Sig Start Date End Date Taking? Authorizing Provider   doxycycline hyclate (VIBRA-TABS) 100 MG tablet Take 1 tablet by mouth 2 times daily for 10 days 24 Yes Sriram Shabazz APRN - CNP   azithromycin (ZITHROMAX) 250 MG tablet 500mg on day 1 followed by 250mg on days 2 - 5 24 Yes Sriram Shabazz APRN - CNP   VYVANSE 30 MG capsule Take 1 capsule by mouth every morning for 30 days. Max Daily Amount: 30 mg 24 Yes Sriram Shabazz APRN - CNP   NONFORMULARY HCG   Yes Domitila Kincaid MD   NONFORMULARY    Yes Domitila Kincaid MD   b complex vitamins capsule Take 1 capsule by mouth daily   Yes Domitila Kincaid MD   NONFORMULARY P5P   Yes Domitila Kincaid MD   UNABLE TO FIND 20 mg ENCLOMIPHENE - every other day   Yes Domitila Kincaid MD   albuterol sulfate HFA (PROVENTIL;VENTOLIN;PROAIR) 108 (90 Base) MCG/ACT inhaler Inhale 2 puffs into the lungs every 6 hours as needed for Wheezing 10/16/23  Yes Jennifer Cortez MD   Cetirizine HCl (ZYRTEC ALLERGY PO) Take by mouth daily   Yes Domitila Kincaid MD   Misc Natural Products (JOINT HEALTH) CAPS Take by mouth   Yes Domitila Kincaid MD   Multiple Vitamins-Minerals (MENS MULTIVITAMIN) TABS Take by mouth   Yes Domitila Kincaid MD   Omega-3 Fatty Acids (FISH OIL) 1000 MG CAPS Take 3 capsules by mouth daily   Yes Domitila Kincaid MD       Current medications:    Current Facility-Administered Medications   Medication Dose Route Frequency Provider Last Rate Last Admin   • sodium chloride flush 0.9 % injection 5-40 mL  5-40 mL IntraVENous 2 times per

## 2024-12-02 NOTE — INTERVAL H&P NOTE
Pt Name: Zana Bailey  MRN: [id number]  YOB: 1995  Date of evaluation: 12/2/2024    I have examined the patient and reviewed the H&P/Consult and there are no changes to the patient or plans.         Electronically signed by ZANA STUART MD on 12/2/2024 at 12:07 PM

## 2024-12-02 NOTE — H&P
[facility] [place] [facility]  [facility] EAR, NOSE AND THROAT  [address]   [address]  [facility] [address]  Dept: [phone]  Dept Fax: [phone]  Loc: [phone]    Zana Bailey is a 29 y.o. male who was referred by Jagdeep Dennis MD for:  Chief Complaint   Patient presents with    preoperative history and physical     Multinodular goiter. Send by Dr. Dennis.    .    HPI:     Zana Bailye is a 29 y.o. male who presents today for evaluation for right thyroid lobectomy.  FNA of the right thyroid nodule in question showed the following cytology report:    FINAL RESULTS:   Right thyroid, FNA:    ATYPIA OF UNDETERMINED SIGNIFICANCE.     Follicular cells with focal nuclear atypia     Afirma testing confirmed a 50% probability of malignancy.  Patient is here to be set up for his right thyroid lobectomy.    The nodule itself was 1.5 cm in greatest dimension. There were no pathologically enlarged lymph nodes adjacent to the thyroid gland.    History:     Allergies   Allergen Reactions    Tobacco Shortness Of Breath     Runny nose    Erythromycin Rash    Other      Pet Dander watery eyes and runny nose     Current Outpatient Medications   Medication Sig Dispense Refill    lisdexamfetamine (VYVANSE) 30 MG capsule Take 1 capsule by mouth daily for 30 days. Max Daily Amount: 30 mg 30 capsule 0    NONFORMULARY HCG      NONFORMULARY       b complex vitamins capsule Take 1 capsule by mouth daily      NONFORMULARY P5P      UNABLE TO FIND 20 mg ENCLOMIPHENE - every other day      albuterol sulfate HFA (PROVENTIL;VENTOLIN;PROAIR) 108 (90 Base) MCG/ACT inhaler Inhale 2 puffs into the lungs every 6 hours as needed for Wheezing 8.5 g 4    Cetirizine HCl (ZYRTEC ALLERGY PO) Take by mouth daily      Misc Natural Products (JOINT HEALTH) CAPS Take by mouth      Multiple Vitamins-Minerals (MENS MULTIVITAMIN) TABS Take by mouth      Omega-3 Fatty Acids (FISH OIL) 1000 MG CAPS Take 3 capsules by mouth daily   questions were answered.  I explained we started by removing the lobe in question and submitting it for pathologic confirmation as to whether it is benign or malignant.  If it were malignant, it might be necessary to remove the other side as well, to permit effective radioactive iodine treatment.    Plan for right thyroid lobectomy with NIM monitoring.    Thyroidectomy, informed consent:  The risks and benefits of the procedure, including but not limited to risk of anesthesia, bleeding, infection, hoarseness, hypocalcemia, and potential need for further surgery or treatment were discussed with the patient. The role of the parathyroid glands and the proximity of the recurrent laryngeal nerve were discussed. The possible consequences of not undergoing the procedures were discussed, along with any alternative treatments. They understand that no guarantees are made regarding either outcome or potential complications. All of their questions were answered. They read the patient information sheet and were given a copy to take with them. They requested we proceed.        Zana Soler MD    **This report has been created using voice recognition software. It may contain minor errors which are inherent in voicerecognition technology.**

## 2024-12-02 NOTE — PROGRESS NOTES
Pt returned to SDS room 7. Vitals and assessment as charted. 0.9 infusing, to count from PACU. Pt has crackers and water. Family at the bedside. Pt and family verbalized understanding of discharge criteria and call light use. Call light in reach.

## 2024-12-03 VITALS
DIASTOLIC BLOOD PRESSURE: 72 MMHG | BODY MASS INDEX: 34.4 KG/M2 | WEIGHT: 254 LBS | HEIGHT: 72 IN | OXYGEN SATURATION: 95 % | HEART RATE: 77 BPM | SYSTOLIC BLOOD PRESSURE: 130 MMHG | TEMPERATURE: 97.9 F | RESPIRATION RATE: 18 BRPM

## 2024-12-03 LAB
ANION GAP SERPL CALC-SCNC: 11 MEQ/L (ref 8–16)
BUN SERPL-MCNC: 13 MG/DL (ref 7–22)
CALCIUM SERPL-MCNC: 8.3 MG/DL (ref 8.5–10.5)
CHLORIDE SERPL-SCNC: 104 MEQ/L (ref 98–111)
CO2 SERPL-SCNC: 24 MEQ/L (ref 23–33)
CREAT SERPL-MCNC: 0.6 MG/DL (ref 0.4–1.2)
EKG ATRIAL RATE: 71 BPM
EKG P AXIS: 50 DEGREES
EKG P-R INTERVAL: 172 MS
EKG Q-T INTERVAL: 410 MS
EKG QRS DURATION: 120 MS
EKG QTC CALCULATION (BAZETT): 445 MS
EKG R AXIS: 30 DEGREES
EKG T AXIS: 34 DEGREES
EKG VENTRICULAR RATE: 71 BPM
GFR SERPL CREATININE-BSD FRML MDRD: > 90 ML/MIN/1.73M2
GLUCOSE SERPL-MCNC: 113 MG/DL (ref 70–108)
POTASSIUM SERPL-SCNC: 3.9 MEQ/L (ref 3.5–5.2)
SODIUM SERPL-SCNC: 139 MEQ/L (ref 135–145)

## 2024-12-03 PROCEDURE — 2580000003 HC RX 258: Performed by: OTOLARYNGOLOGY

## 2024-12-03 PROCEDURE — 36415 COLL VENOUS BLD VENIPUNCTURE: CPT

## 2024-12-03 PROCEDURE — 93010 ELECTROCARDIOGRAM REPORT: CPT | Performed by: INTERNAL MEDICINE

## 2024-12-03 PROCEDURE — 80048 BASIC METABOLIC PNL TOTAL CA: CPT

## 2024-12-03 PROCEDURE — 6360000002 HC RX W HCPCS: Performed by: OTOLARYNGOLOGY

## 2024-12-03 PROCEDURE — 93005 ELECTROCARDIOGRAM TRACING: CPT | Performed by: REGISTERED NURSE

## 2024-12-03 PROCEDURE — 99238 HOSP IP/OBS DSCHRG MGMT 30/<: CPT | Performed by: REGISTERED NURSE

## 2024-12-03 PROCEDURE — 6370000000 HC RX 637 (ALT 250 FOR IP): Performed by: OTOLARYNGOLOGY

## 2024-12-03 RX ORDER — HYDROCODONE BITARTRATE AND ACETAMINOPHEN 7.5; 325 MG/1; MG/1
1 TABLET ORAL EVERY 6 HOURS PRN
Qty: 12 TABLET | Refills: 0 | Status: SHIPPED | OUTPATIENT
Start: 2024-12-03 | End: 2024-12-06

## 2024-12-03 RX ADMIN — MORPHINE SULFATE 4 MG: 4 INJECTION, SOLUTION INTRAMUSCULAR; INTRAVENOUS at 10:13

## 2024-12-03 RX ADMIN — SODIUM CHLORIDE: 9 INJECTION, SOLUTION INTRAVENOUS at 14:34

## 2024-12-03 RX ADMIN — MORPHINE SULFATE 4 MG: 4 INJECTION, SOLUTION INTRAMUSCULAR; INTRAVENOUS at 07:44

## 2024-12-03 RX ADMIN — DOXYCYCLINE HYCLATE 100 MG: 100 TABLET, COATED ORAL at 08:16

## 2024-12-03 RX ADMIN — MORPHINE SULFATE 4 MG: 4 INJECTION, SOLUTION INTRAMUSCULAR; INTRAVENOUS at 00:36

## 2024-12-03 RX ADMIN — HYDROCODONE BITARTRATE AND ACETAMINOPHEN 1 TABLET: 7.5; 325 TABLET ORAL at 01:41

## 2024-12-03 RX ADMIN — SODIUM CHLORIDE: 9 INJECTION, SOLUTION INTRAVENOUS at 04:39

## 2024-12-03 RX ADMIN — SODIUM CHLORIDE, PRESERVATIVE FREE 10 ML: 5 INJECTION INTRAVENOUS at 07:44

## 2024-12-03 RX ADMIN — HYDROCODONE BITARTRATE AND ACETAMINOPHEN 1 TABLET: 7.5; 325 TABLET ORAL at 12:40

## 2024-12-03 RX ADMIN — MORPHINE SULFATE 4 MG: 4 INJECTION, SOLUTION INTRAMUSCULAR; INTRAVENOUS at 04:39

## 2024-12-03 ASSESSMENT — PAIN SCALES - GENERAL
PAINLEVEL_OUTOF10: 7
PAINLEVEL_OUTOF10: 7
PAINLEVEL_OUTOF10: 6
PAINLEVEL_OUTOF10: 7
PAINLEVEL_OUTOF10: 7
PAINLEVEL_OUTOF10: 5

## 2024-12-03 ASSESSMENT — PAIN DESCRIPTION - ORIENTATION
ORIENTATION: MID

## 2024-12-03 ASSESSMENT — PAIN DESCRIPTION - DESCRIPTORS
DESCRIPTORS: DISCOMFORT;SORE
DESCRIPTORS: ACHING;DISCOMFORT
DESCRIPTORS: ACHING;DISCOMFORT
DESCRIPTORS: DISCOMFORT;SORE
DESCRIPTORS: ACHING;DISCOMFORT
DESCRIPTORS: DISCOMFORT;SORE
DESCRIPTORS: DISCOMFORT;SORE

## 2024-12-03 ASSESSMENT — PAIN - FUNCTIONAL ASSESSMENT
PAIN_FUNCTIONAL_ASSESSMENT: ACTIVITIES ARE NOT PREVENTED

## 2024-12-03 ASSESSMENT — PAIN DESCRIPTION - ONSET: ONSET: ON-GOING

## 2024-12-03 ASSESSMENT — PAIN DESCRIPTION - LOCATION
LOCATION: THROAT

## 2024-12-03 ASSESSMENT — PAIN DESCRIPTION - FREQUENCY: FREQUENCY: INTERMITTENT

## 2024-12-03 ASSESSMENT — PAIN DESCRIPTION - PAIN TYPE
TYPE: SURGICAL PAIN
TYPE: SURGICAL PAIN

## 2024-12-03 NOTE — PLAN OF CARE
Problem: Discharge Planning  Goal: Discharge to home or other facility with appropriate resources  Outcome: Progressing  Flowsheets (Taken 12/3/2024 0459)  Discharge to home or other facility with appropriate resources:   Identify barriers to discharge with patient and caregiver   Identify discharge learning needs (meds, wound care, etc)     Problem: Pain  Goal: Verbalizes/displays adequate comfort level or baseline comfort level  Outcome: Progressing  Flowsheets (Taken 12/3/2024 0459)  Verbalizes/displays adequate comfort level or baseline comfort level:   Encourage patient to monitor pain and request assistance   Administer analgesics based on type and severity of pain and evaluate response   Implement non-pharmacological measures as appropriate and evaluate response   Assess pain using appropriate pain scale     Problem: Skin/Tissue Integrity  Goal: Absence of new skin breakdown  Description: 1.  Monitor for areas of redness and/or skin breakdown  2.  Assess vascular access sites hourly  3.  Every 4-6 hours minimum:  Change oxygen saturation probe site  4.  Every 4-6 hours:  If on nasal continuous positive airway pressure, respiratory therapy assess nares and determine need for appliance change or resting period.  Outcome: Progressing  Note: Skin assessment completed.  Patient turned every 2 hours and as needed.  No skin breakdown this shift.         Problem: Safety - Adult  Goal: Free from fall injury  Outcome: Progressing  Flowsheets (Taken 12/3/2024 0459)  Free From Fall Injury: Instruct family/caregiver on patient safety       Care plan reviewed with patient and family. Patient and family verbalize understanding of the plan of care and contribute to goal setting.

## 2024-12-03 NOTE — PROGRESS NOTES
Pt admitted to  5K20 via Bed from  Same Day Surgery .  Complaints: None.    IV normal saline infusing into the hand right, condition patent and no redness at a rate of 100 mls/ hour with about 300 mls in the bag still. IV site free of s/s of infection or infiltration. Vital signs obtained. Assessment and data collection initiated. Two nurse skin assessment performed by Ronald RN and Agustin RN. Oriented to room. Policies and procedures for 5K explained. All questions answered with no further questions at this time. Fall prevention and safety brochure discussed with patient.  Bed alarm on. Call light in reach.Oriented to room.   Ronald Beauchamp RN, RN 12/2/2024 8:00 PM     Explained patients right to have family, representative or physician notified of their admission.  Patient has Declined for physician to be notified.  Patient has Declined for family/representative to be notified.

## 2024-12-03 NOTE — PROCEDURES
PROCEDURE NOTE  Date: 12/3/2024   Name: Zana Bailey  YOB: 1995    Procedures  12 lead EKG completed. Results handed to Yaquelin Frederick CET

## 2024-12-03 NOTE — PROGRESS NOTES
Discharge education and instructions provided. Patient verbalized understanding at this time. No questions asked. IV access removed. All personal belongings, AVS and new medications present with patient. ZELALEM drain education provided with no further questions at this time. Transport to main lobby via wheelchair.  Chart contents placed in yellow bin.

## 2024-12-03 NOTE — RT PROTOCOL NOTE
RT Inhaler-Nebulizer Bronchodilator Protocol Note    There is a bronchodilator order in the chart from a provider indicating to follow the RT Bronchodilator Protocol and there is an “Initiate RT Inhaler-Nebulizer Bronchodilator Protocol” order as well (see protocol at bottom of note).    CXR Findings:  No results found.    The findings from the last RT Protocol Assessment were as follows:   History Pulmonary Disease: Chronic pulmonary disease (asthma)  Respiratory Pattern: Regular pattern and RR 12-20 bpm  Breath Sounds: Clear breath sounds  Cough: Strong, spontaneous, non-productive  Indication for Bronchodilator Therapy:    Bronchodilator Assessment Score: 2    Aerosolized bronchodilator medication orders have been revised according to the RT Inhaler-Nebulizer Bronchodilator Protocol below.    Respiratory Therapist to perform RT Therapy Protocol Assessment initially then follow the protocol.  Repeat RT Therapy Protocol Assessment PRN for score 0-3 or on second treatment, BID, and PRN for scores above 3.    No Indications - adjust the frequency to every 6 hours PRN wheezing or bronchospasm, if no treatments needed after 48 hours then discontinue using Per Protocol order mode.     If indication present, adjust the RT bronchodilator orders based on the Bronchodilator Assessment Score as indicated below.  Use Inhaler orders unless patient has one or more of the following: on home nebulizer, not able to hold breath for 10 seconds, is not alert and oriented, cannot activate and use MDI correctly, or respiratory rate 25 breaths per minute or more, then use the equivalent nebulizer order(s) with same Frequency and PRN reasons based on the score.  If a patient is on this medication at home then do not decrease Frequency below that used at home.    0-3 - enter or revise RT bronchodilator order(s) to equivalent RT Bronchodilator order with Frequency of every 4 hours PRN for wheezing or increased work of breathing using Per

## 2024-12-03 NOTE — DISCHARGE INSTRUCTIONS
Post-Operative Instructions  Thyroidectomy   Diet   Patients who have received general anesthesia may experience nausea and occasionally, vomiting. It is therefore preferable to eat a bland light meal or a liquid diet on the first day after the surgery. Regular diet may be resumed the next day. Also, pain pills may cause nausea if taken on an empty stomach. It would be better to take those pills with a piece of toast or some food.  Activity and Wound Care   Elevate the head as much as possible. Head elevation reduces bruising and swelling. Occasionally, you may notice that the bruises or swelling have migrated to lower regions of the body. Avoid straining and heavy lifting, but do not stay in bed. Walk or move your legs as much as possible, to prevent blood clots and gradually resume your normal activity. You may have a dressing or your wound may be exposed. Your wound will most probably be sealed with steristrips. They peal off by themselves in 10 - 15 days.  Medications   You may also receive a prescription for painkillers in the form of  hydrocodone. These products cause somnolence, drowsiness and constipation. DO NOT DRIVE IF YOU ARE TAKING PAIN KILLERS. Occasionally, Zofran tablets or Phenergan suppositories may be necessary for nausea or vomiting. If needed, you may also receive a prescription for thyroid replacement hormone and occasionally, for calcium tablets.  ZELALEM Drain Care   Keep dressing on the drain site intact until removed by surgeon, unless fully soiled. Get instructions for ZELALEM drain care from nursing staff before leaving hospital. Empty and record the volume every 12 hours. Keep the bulb compressed and IN PLAIN SIGHT AT ALL TIMES.  If it is not compressed, it is not working, and fluid may accumulate in the wound and cause an infection or hematoma.  Follow-up   Your post operative appointment will be made the day of surgery. Make sure the nursing staff provides you with this appointment before you

## 2024-12-03 NOTE — DISCHARGE SUMMARY
DISCHARGE SUMMARY    Pt Name: Zana Bailey  MRN: [id number]  YOB: 1995  Primary Care Physician: Sriram Shabazz APRN - CNP    Admit date:  12/2/2024  9:27 AM  Discharge date:  12/3/24  Disposition: Home in stable condition  Admitting Diagnosis:   1. Acute post-operative pain    2. Multinodular goiter    3. 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 He was admitted overnight for monitoring, pain control, and diet advancement. Patient had brief non specific complaints of chest pain and EKG and labwork obtained. Chest pain abated without any intervention and was associated with complaints of 'molar pain' at the same time. Patient had no complaints regarding numbness/tingling or uncontrolled pain. EKG noted sinus arrhythmia; no cardiac history reported. Patient will follow up with PCP further regarding this. ZELALEM drain maintaining vacuum seal and appropriate output noted.  At time of discharge, Zana was tolerating a regular diet, having bowel movements, ambulating on his own accord and had adequate analgesia on oral pain medications. Patient had no signs or symptoms of complications.    PHYSICAL EXAMINATION     Discharge Vitals:  height is 1.829 m (6') and weight is 115.2 kg (254 lb). His oral temperature is 97.9 °F (36.6 °C). His blood pressure is 130/72 and  his pulse is 77. His respiration is 18 and oxygen saturation is 95%.     General appearance - alert, well appearing, and in no distress and oriented to person, place, and time  Incision - healing well, no drainage, no seroma, no swelling, well approximated. ZELALEM drain to left lateral neck and secured with stay suture. Sanguineous drainage to bulb noted.     Neck: Trachea midline.  No palpable hematoma/seroma surrounding anterior neck incision.  Lymphatic: No cervical lymphadenopathy noted.  Eyes: JOANNE, EOM intact. Conjunctiva moist without discharge.  Lungs: Normal effort of breathing, not obviously distressed. On room air.    LABS     No results for input(s): \"WBC\", \"HGB\", \"HCT\", \"PLT\", \"NA\", \"K\", \"CL\", \"CO2\", \"BUN\", \"CREATININE\" in the last 72 hours.    DISCHARGE INSTRUCTIONS     Discharge Medications:      Medication List        START taking these medications      HYDROcodone-acetaminophen 7.5-325 MG per tablet  Commonly known as: NORCO  Take 1 tablet by mouth every 6 hours as needed for Pain for up to 3 days. Max Daily Amount: 4 tablets            CONTINUE taking these medications      albuterol sulfate  (90 Base) MCG/ACT inhaler  Commonly known as: PROVENTIL;VENTOLIN;PROAIR  Inhale 2 puffs into the lungs every 6 hours as needed for Wheezing     b complex vitamins capsule     doxycycline hyclate 100 MG tablet  Commonly known as: VIBRA-TABS  Take 1 tablet by mouth 2 times daily for 10 days     fish oil 1000 MG capsule     Joint Health Caps     Mens Multivitamin Tabs     NONFORMULARY     NONFORMULARY     NONFORMULARY     UNABLE TO FIND     Vyvanse 30 MG capsule  Generic drug: lisdexamfetamine  Take 1 capsule by mouth every morning for 30 days. Max Daily Amount: 30 mg     ZYRTEC ALLERGY PO            STOP taking these medications      azithromycin 250 MG tablet  Commonly known as: ZITHROMAX               Where to Get Your Medications        These medications were sent to [facility] -

## 2024-12-04 ENCOUNTER — HOSPITAL ENCOUNTER (OUTPATIENT)
Age: 29
Discharge: HOME | End: 2024-12-04
Payer: COMMERCIAL

## 2024-12-04 DIAGNOSIS — E89.0 S/P PARTIAL THYROIDECTOMY: ICD-10-CM

## 2024-12-04 LAB
CALCIUM SERPL-MCNC: 9.1 MG/DL (ref 8.5–10.5)
PTH-INTACT SERPL-MCNC: 15.1 PG/ML (ref 15–65)

## 2024-12-04 PROCEDURE — 36415 COLL VENOUS BLD VENIPUNCTURE: CPT

## 2024-12-04 PROCEDURE — 83970 ASSAY OF PARATHORMONE: CPT

## 2024-12-04 PROCEDURE — 82310 ASSAY OF CALCIUM: CPT

## 2024-12-05 ENCOUNTER — OFFICE VISIT (OUTPATIENT)
Dept: ENT CLINIC | Age: 29
End: 2024-12-05

## 2024-12-05 VITALS
BODY MASS INDEX: 34.72 KG/M2 | SYSTOLIC BLOOD PRESSURE: 140 MMHG | DIASTOLIC BLOOD PRESSURE: 84 MMHG | TEMPERATURE: 97.8 F | HEIGHT: 72 IN | HEART RATE: 71 BPM | WEIGHT: 256.3 LBS | OXYGEN SATURATION: 97 %

## 2024-12-05 DIAGNOSIS — C73 PRIMARY PAPILLARY CARCINOMA OF THYROID GLAND (HCC): Primary | ICD-10-CM

## 2024-12-05 DIAGNOSIS — E89.0 STATUS POST PARTIAL THYROIDECTOMY: ICD-10-CM

## 2024-12-05 DIAGNOSIS — R49.0 HOARSENESS: ICD-10-CM

## 2024-12-05 PROCEDURE — 99024 POSTOP FOLLOW-UP VISIT: CPT | Performed by: OTOLARYNGOLOGY

## 2024-12-05 NOTE — PROGRESS NOTES
[facility] [place] [facility]  [facility] EAR, NOSE AND THROAT  [address]  [address]  [facility] [address]  Dept: [phone]  Dept Fax: [phone]  Loc: [phone]    Zana Bailey is a 29 y.o. male who was referred by No ref. provider found for:  Chief Complaint   Patient presents with    Post-Op Check     12/02 r thyroid (90 day global) possible drain pull     .    HPI:     Zana Bailey is a 29 y.o. male who presents today for follow-up on his right hemithyroidectomy on December 2.  He had 30 cc in the bulb for the evening emptying the drain yesterday.  There is what appears to be much less in the bulb this morning.  He is having breathy hoarseness with no dysphagia.      History:     Allergies   Allergen Reactions    Tobacco Shortness Of Breath     Runny nose    Erythromycin Rash    Other      Pet Dander watery eyes and runny nose     Current Outpatient Medications   Medication Sig Dispense Refill    VYVANSE 30 MG capsule Take 1 capsule by mouth every morning for 30 days. Max Daily Amount: 30 mg 30 capsule 0    b complex vitamins capsule Take 1 capsule by mouth daily      NONFORMULARY P5P      albuterol sulfate HFA (PROVENTIL;VENTOLIN;PROAIR) 108 (90 Base) MCG/ACT inhaler Inhale 2 puffs into the lungs every 6 hours as needed for Wheezing 8.5 g 4    Cetirizine HCl (ZYRTEC ALLERGY PO) Take by mouth daily      Misc Natural Products (JOINT HEALTH) CAPS Take by mouth      Multiple Vitamins-Minerals (MENS MULTIVITAMIN) TABS Take by mouth      Omega-3 Fatty Acids (FISH OIL) 1000 MG CAPS Take 3 capsules by mouth daily      SYNTHROID 100 MCG tablet Take 0.5 tablets by mouth Daily for 10 days, THEN 1 tablet Daily. 60 tablet 1     No current facility-administered medications for this visit.     Past Medical History:   Diagnosis Date    ADHD (attention deficit hyperactivity disorder)     Anxiety 2012    Asthma     Chronic back pain     COVID     Headache 2024    When 1st waking up    Obesity

## 2024-12-06 ENCOUNTER — TELEPHONE (OUTPATIENT)
Dept: ENT CLINIC | Age: 29
End: 2024-12-06

## 2024-12-06 ENCOUNTER — OFFICE VISIT (OUTPATIENT)
Dept: ENT CLINIC | Age: 29
End: 2024-12-06

## 2024-12-06 VITALS
RESPIRATION RATE: 18 BRPM | DIASTOLIC BLOOD PRESSURE: 76 MMHG | BODY MASS INDEX: 34.67 KG/M2 | OXYGEN SATURATION: 96 % | HEIGHT: 72 IN | HEART RATE: 68 BPM | SYSTOLIC BLOOD PRESSURE: 138 MMHG | TEMPERATURE: 97.9 F | WEIGHT: 256 LBS

## 2024-12-06 DIAGNOSIS — R49.0 HOARSENESS OF VOICE: ICD-10-CM

## 2024-12-06 DIAGNOSIS — E89.0 S/P PARTIAL THYROIDECTOMY: Primary | ICD-10-CM

## 2024-12-06 DIAGNOSIS — C73 PAPILLARY MICROCARCINOMA OF THYROID (HCC): ICD-10-CM

## 2024-12-06 PROCEDURE — 99024 POSTOP FOLLOW-UP VISIT: CPT | Performed by: REGISTERED NURSE

## 2024-12-06 NOTE — TELEPHONE ENCOUNTER
Patient s/p right thyroid lobectomy 12/2 with Dr. Soler.     Pathology reviewed with patient today noted right thyroid papillary carcinoma; well encapsulated 0.8 cm and contained to thyroid lobe. No evidence of follicular or infiltrative nature on pathology report.     Spoke with pathology Dr. Kwon and he described this as being a classic encapsulated papillary carcinoma; considered low profile risk.     With patient history of Hashimoto's Dr. Soler requested reaching out to Dr. Dennis regarding recommendations for this patient as to need for further surgical care from endocrinology standpoint.     Planning for updated TSH/free T4 in the next two weeks to monitor post surgical function.    Last thyroid US noted no thyroid nodules or concerning findings to left thyroid lobe.    Pathology Report:  FINAL DIAGNOSIS:   A: Inferior to right lower pole of thyroid, lymph node, excision:     One lymph node with no evidence malignancy.  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    Carcinoma is limited to the thyroid and margins are free of   carcinoma.    Background diffuse lymphocytic thyroiditis (consistent with   Hashimoto's      thyroiditis in the appropriate clinical setting).       Four lymph nodes with no evidence malignancy.  (0/4)         One parathyroid gland with no significant pathologic findings.       pT1a, pN0a

## 2024-12-06 NOTE — TELEPHONE ENCOUNTER
Christine wanted me to reach out to the patient to see if his drain output was 30 or more in 24 hours if so he has to r/s for Monday. And if his drain output is 30 or less he can keep his appointment for today.   Attempted to call patient. Unable to leave voicemail, will try at a later time.

## 2024-12-06 NOTE — PROGRESS NOTES
with no significant pathologic findings.       pT1a, pN0a   Assessment/Plan:      ICD-10-CM    1. S/P partial thyroidectomy  E89.0 TSH     T4, Free      2. Papillary microcarcinoma of thyroid (HCC)  C73       3. Hoarseness of voice  R49.0            ZELALEM drain removed without complication; output appropriate. Reviewed post surgical restrictions with patient who verbalized understanding.  Pathology reviewed with patient as well as Dr. Soler and pathology after patient left clinic. Evidence of papillary microcarcinoma of right thyroid lobe that was 0.8 cm and well encapsulated. Per Dr. Soler recommend reaching out to established endocrinology regarding surveillance recommendations for this patient. Reviewed with pathologist who deems this a low profile risk.   Thyroid function testing placed to be completed in two weeks and patient to return to care in two weeks for site check and to touch base regarding labwork results and next goals of care. Reached out to endocrinology to obtain their recommendations; awaiting response.    Electronically signed by HORACIO Stiles CNP on 12/6/2024 at 2:19 PM

## 2024-12-06 NOTE — TELEPHONE ENCOUNTER
Dr. Soler is requesting patient return to care in 2 weeks for site check, thyroid lab results, and touch base on vocal response if we can please find an appointment around this time frame rather than previously scheduled one I had for patient. Thank you!

## 2024-12-09 ENCOUNTER — HOSPITAL ENCOUNTER (OUTPATIENT)
Age: 29
Discharge: HOME | End: 2024-12-09
Payer: COMMERCIAL

## 2024-12-09 DIAGNOSIS — E89.0 S/P PARTIAL THYROIDECTOMY: ICD-10-CM

## 2024-12-09 PROCEDURE — 36415 COLL VENOUS BLD VENIPUNCTURE: CPT

## 2024-12-09 PROCEDURE — 84443 ASSAY THYROID STIM HORMONE: CPT

## 2024-12-09 PROCEDURE — 84439 ASSAY OF FREE THYROXINE: CPT

## 2024-12-09 NOTE — OP NOTE
Operative Note      Patient: Zana Bailey  YOB: 1995  MRN: [id number]    Date of Procedure: 12/2/2024    Pre-Op Diagnosis Codes:      * Multinodular goiter [E04.2]     * HASHIMOTOS THYROIDITIS     Post-Op Diagnosis: Same and PATH PENDING       Procedure(s):  RIGHT THYROID LOBECTOMY, THIRD PARTY RECURRENT NERVE MONITORING.     Surgeon(s):  Zana Soler MD     Assistant:  First Assistant: Mignon Ball RN     Anesthesia: General     Estimated Blood Loss (mL): less than 50      Complications: None     Specimens:   ID Type Source Tests Collected by Time Destination   A : Lymph Node inferior to right lower pole of Thyroid Tissue Thyroid SURGICAL PATHOLOGY Zana Soler MD 12/2/2024 1427     B : right thyroid lobe (small pyramabial lobe) Tissue Thyroid SURGICAL PATHOLOGY Zana Soler MD 12/2/2024 1525           Implants:  * No implants in log *      Drains:        Closed/Suction Drain Left Neck Bulb (Active)   Site Description Clean, dry & intact 12/02/24 1744   Dressing Status Clean, dry & intact 12/02/24 1744   Drainage Appearance Bloody 12/02/24 1744   Drain Status Compressed 12/02/24 1744         Findings: suspicious nodule right thyroid lobe. Small inferior lymph node frozen section and was a reactive lymph node. Right infant. Parathyroid preserved number 15 Richard drain placed. Recurrent laryngeal nerve responded to light stimulation at the end of the procedure.    Detailed Description of Procedure:   After an adequate level of general endotracheal anesthesia was obtained with the NIM monitoring endotracheal tube, I double checked the position using a Cole laryngoscope.  I supervised the taping so it could not rotate.     Anterior neck was prepped and draped in an aseptic fashion in the usual manner for thyroidectomy.  Skin line incision was marked out 2 cm above the clavicles extending between the medial borders of each sternocleidomastoid.  Lidocaine with 1 100,000 epinephrine was

## 2024-12-10 ENCOUNTER — TELEPHONE (OUTPATIENT)
Dept: ENT CLINIC | Age: 29
End: 2024-12-10

## 2024-12-10 LAB
T4 FREE SERPL-MCNC: 0.92 NG/DL (ref 0.93–1.68)
TSH SERPL DL<=0.005 MIU/L-ACNC: 7.57 UIU/ML (ref 0.4–4.2)

## 2024-12-10 NOTE — TELEPHONE ENCOUNTER
Please call patient and inform him that his thyroid labwork indicates that his remaining thyroid lobe is not functioning as well as needed. We can discuss starting thyroid replacement at this time if he is agreeable with repeat labwork in the next 6-8 weeks.    Lab Results   Component Value Date    TSH 7.570 (H) 12/09/2024    X6LLOUJ 111 11/02/2023    T4FREE 0.92 (L) 12/09/2024

## 2024-12-11 ENCOUNTER — OFFICE VISIT (OUTPATIENT)
Dept: FAMILY MEDICINE CLINIC | Age: 29
End: 2024-12-11
Payer: COMMERCIAL

## 2024-12-11 VITALS
OXYGEN SATURATION: 97 % | RESPIRATION RATE: 18 BRPM | BODY MASS INDEX: 34.67 KG/M2 | HEART RATE: 78 BPM | WEIGHT: 256 LBS | HEIGHT: 72 IN | SYSTOLIC BLOOD PRESSURE: 118 MMHG | DIASTOLIC BLOOD PRESSURE: 76 MMHG

## 2024-12-11 DIAGNOSIS — E06.3 HYPOTHYROIDISM DUE TO HASHIMOTO THYROIDITIS: ICD-10-CM

## 2024-12-11 DIAGNOSIS — F33.0 MILD EPISODE OF RECURRENT MAJOR DEPRESSIVE DISORDER (HCC): ICD-10-CM

## 2024-12-11 DIAGNOSIS — Z09 HOSPITAL DISCHARGE FOLLOW-UP: Primary | ICD-10-CM

## 2024-12-11 DIAGNOSIS — E04.2 MULTINODULAR GOITER: ICD-10-CM

## 2024-12-11 PROCEDURE — 99214 OFFICE O/P EST MOD 30 MIN: CPT

## 2024-12-11 PROCEDURE — 1111F DSCHRG MED/CURRENT MED MERGE: CPT

## 2024-12-11 ASSESSMENT — ENCOUNTER SYMPTOMS
TROUBLE SWALLOWING: 0
APNEA: 0
STRIDOR: 0
SORE THROAT: 0
SINUS PRESSURE: 0
NAUSEA: 0
CHEST TIGHTNESS: 0
COUGH: 0
CHOKING: 0
VOMITING: 0
VOICE CHANGE: 1
RHINORRHEA: 0
SHORTNESS OF BREATH: 0
WHEEZING: 0
COLOR CHANGE: 0
ABDOMINAL PAIN: 0
FACIAL SWELLING: 0
DIARRHEA: 0

## 2024-12-11 NOTE — PROGRESS NOTES
follow up with ent to discuss starting thyroid medication.    Plans to establish with new endo next few months. Follow up on low Testocerone and hypothyroidism.       Inpatient course: Discharge summary reviewed- see chart.    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 If you have any questions, ask your nurse or doctor.                CONTINUE taking these medications      albuterol sulfate  (90 Base) MCG/ACT inhaler  Commonly known as: PROVENTIL;VENTOLIN;PROAIR  Inhale 2 puffs into the lungs every 6 hours as needed for Wheezing     b complex vitamins capsule     fish oil 1000 MG capsule     Joint Health Caps     Mens Multivitamin Tabs     NONFORMULARY     Vyvanse 30 MG capsule  Generic drug: lisdexamfetamine  Take 1 capsule by mouth every morning for 30 days. Max Daily Amount: 30 mg     ZYRTEC ALLERGY PO            STOP taking these medications      NONFORMULARY  Stopped by: HORACIO Nelson CNP     NONFORMULARY  Stopped by: HORACIO Nelson CNP     UNABLE TO FIND  Stopped by: HORACIO Nelson CNP               Medications marked \"taking\" at this time  Outpatient Medications Marked as Taking for the 12/11/24 encounter (Office Visit) with Sriram Shabazz APRN - CNP   Medication Sig Dispense Refill    VYVANSE 30 MG capsule Take 1 capsule by mouth every morning for 30 days. Max Daily Amount: 30 mg 30 capsule 0    b complex vitamins capsule Take 1 capsule by mouth daily      NONFORMULARY

## 2024-12-13 NOTE — TELEPHONE ENCOUNTER
3rd attempt:  Attempted to call patient. Got voicemail, left message to call the office.  Sending letter.

## 2024-12-17 ENCOUNTER — OFFICE VISIT (OUTPATIENT)
Dept: ENT CLINIC | Age: 29
End: 2024-12-17

## 2024-12-17 VITALS
SYSTOLIC BLOOD PRESSURE: 124 MMHG | BODY MASS INDEX: 35.28 KG/M2 | DIASTOLIC BLOOD PRESSURE: 86 MMHG | TEMPERATURE: 97.9 F | WEIGHT: 260.5 LBS | HEIGHT: 72 IN | OXYGEN SATURATION: 99 % | HEART RATE: 71 BPM

## 2024-12-17 DIAGNOSIS — C73 PAPILLARY CARCINOMA OF THYROID (HCC): ICD-10-CM

## 2024-12-17 DIAGNOSIS — E89.0 POSTOPERATIVE HYPOTHYROIDISM: ICD-10-CM

## 2024-12-17 DIAGNOSIS — E06.3 HASHIMOTO'S THYROIDITIS: Primary | ICD-10-CM

## 2024-12-17 PROCEDURE — 99024 POSTOP FOLLOW-UP VISIT: CPT | Performed by: OTOLARYNGOLOGY

## 2024-12-17 RX ORDER — LEVOTHYROXINE SODIUM 100 MCG
TABLET ORAL
Qty: 60 TABLET | Refills: 1 | Status: SHIPPED | OUTPATIENT
Start: 2024-12-17 | End: 2025-04-21

## 2024-12-17 NOTE — PROGRESS NOTES
[facility] [place] [facility]  [facility]  [address]   [address]  [facility] [address]  Dept: [phone]  Dept Fax: [phone]  Loc: [phone]    Zana Bailey is a 29 y.o. male who was referred by No ref. provider found for:  Chief Complaint   Patient presents with    Follow-up     Patient here for a two week follow up R thyroid. Patient would like to discuss thyroid medication. Discuss labs 12/9. Patient curious if there is anything he can do to get his voice back. Patient would like a work release.   .    HPI:     Zana Bailey is a 29 y.o. male who presents today for follow-up on his right hemithyroidectomy 2 weeks ago.  He is having no difficulties except for some hoarseness and he feels tired.  Believes his voice is stronger.  TSH was 7.5 perioperatively.  TSH was elevated even preop above the ideal range..    History:     Allergies   Allergen Reactions    Tobacco Shortness Of Breath     Runny nose    Erythromycin Rash    Other      Pet Dander watery eyes and runny nose     Current Outpatient Medications   Medication Sig Dispense Refill    SYNTHROID 100 MCG tablet Take 0.5 tablets by mouth Daily for 10 days, THEN 1 tablet Daily. 60 tablet 1    VYVANSE 30 MG capsule Take 1 capsule by mouth every morning for 30 days. Max Daily Amount: 30 mg 30 capsule 0    b complex vitamins capsule Take 1 capsule by mouth daily      NONFORMULARY P5P      albuterol sulfate HFA (PROVENTIL;VENTOLIN;PROAIR) 108 (90 Base) MCG/ACT inhaler Inhale 2 puffs into the lungs every 6 hours as needed for Wheezing 8.5 g 4    Cetirizine HCl (ZYRTEC ALLERGY PO) Take by mouth daily      Misc Natural Products (JOINT HEALTH) CAPS Take by mouth      Multiple Vitamins-Minerals (MENS MULTIVITAMIN) TABS Take by mouth      Omega-3 Fatty Acids (FISH OIL) 1000 MG CAPS Take 3 capsules by mouth daily       No current facility-administered medications for this visit.     Past Medical History:   Diagnosis

## 2024-12-23 ENCOUNTER — HOSPITAL ENCOUNTER (OUTPATIENT)
Age: 29
Discharge: HOME | End: 2024-12-23
Payer: COMMERCIAL

## 2024-12-23 DIAGNOSIS — F90.9 ATTENTION DEFICIT HYPERACTIVITY DISORDER (ADHD), UNSPECIFIED ADHD TYPE: ICD-10-CM

## 2024-12-23 DIAGNOSIS — E06.3 HASHIMOTO'S THYROIDITIS: ICD-10-CM

## 2024-12-23 DIAGNOSIS — E89.0 POSTOPERATIVE HYPOTHYROIDISM: ICD-10-CM

## 2024-12-23 DIAGNOSIS — C73 PAPILLARY CARCINOMA OF THYROID (HCC): ICD-10-CM

## 2024-12-23 LAB
T4 FREE SERPL-MCNC: 1.07 NG/DL (ref 0.93–1.68)
TSH SERPL DL<=0.005 MIU/L-ACNC: 13.36 UIU/ML (ref 0.4–4.2)

## 2024-12-23 PROCEDURE — 84443 ASSAY THYROID STIM HORMONE: CPT

## 2024-12-23 PROCEDURE — 36415 COLL VENOUS BLD VENIPUNCTURE: CPT

## 2024-12-23 PROCEDURE — 84439 ASSAY OF FREE THYROXINE: CPT

## 2024-12-23 PROCEDURE — 84432 ASSAY OF THYROGLOBULIN: CPT

## 2024-12-23 RX ORDER — LISDEXAMFETAMINE DIMESYLATE 30 MG/1
30 CAPSULE ORAL EVERY MORNING
Qty: 30 CAPSULE | Refills: 0 | Status: SHIPPED | OUTPATIENT
Start: 2024-12-23 | End: 2024-12-23 | Stop reason: SDUPTHER

## 2024-12-23 RX ORDER — LISDEXAMFETAMINE DIMESYLATE 30 MG/1
30 CAPSULE ORAL EVERY MORNING
Qty: 30 CAPSULE | Refills: 0 | Status: SHIPPED | OUTPATIENT
Start: 2024-12-23 | End: 2025-01-22

## 2024-12-23 NOTE — TELEPHONE ENCOUNTER
This medication refill is regarding a electronic request.  Refill requested by patient.    Requested Prescriptions     Pending Prescriptions Disp Refills    VYVANSE 30 MG capsule 30 capsule 0     Sig: Take 1 capsule by mouth every morning for 30 days. Max Daily Amount: 30 mg       Date of last visit: 12/11/2024  Date of next visit: Visit date not found  Date of last refill: 12/23/2024  Pharmacy Name: [facility] [place]

## 2024-12-23 NOTE — TELEPHONE ENCOUNTER
This medication refill is regarding a MyChart request.  Refill requested by patient.    Requested Prescriptions     Pending Prescriptions Disp Refills    VYVANSE 30 MG capsule 30 capsule 0     Sig: Take 1 capsule by mouth every morning for 30 days. Max Daily Amount: 30 mg       Date of last visit: 12/11/2024  Date of next visit: Visit date not found  Date of last refill: 11/19/2024  Pharmacy Name: [facility] in [place]

## 2025-01-24 ENCOUNTER — HOSPITAL ENCOUNTER (OUTPATIENT)
Age: 30
Discharge: HOME | End: 2025-01-24
Payer: COMMERCIAL

## 2025-01-24 DIAGNOSIS — E89.0 POSTOPERATIVE HYPOTHYROIDISM: ICD-10-CM

## 2025-01-24 DIAGNOSIS — C73 PAPILLARY CARCINOMA OF THYROID (HCC): ICD-10-CM

## 2025-01-24 DIAGNOSIS — E06.3 HASHIMOTO'S THYROIDITIS: ICD-10-CM

## 2025-01-24 LAB
T4 FREE SERPL-MCNC: 1.4 NG/DL (ref 0.93–1.68)
TSH SERPL DL<=0.005 MIU/L-ACNC: 7.32 UIU/ML (ref 0.4–4.2)

## 2025-01-24 PROCEDURE — 84439 ASSAY OF FREE THYROXINE: CPT

## 2025-01-24 PROCEDURE — 84443 ASSAY THYROID STIM HORMONE: CPT

## 2025-01-24 PROCEDURE — 36415 COLL VENOUS BLD VENIPUNCTURE: CPT

## 2025-01-27 DIAGNOSIS — F90.9 ATTENTION DEFICIT HYPERACTIVITY DISORDER (ADHD), UNSPECIFIED ADHD TYPE: ICD-10-CM

## 2025-01-27 RX ORDER — LISDEXAMFETAMINE DIMESYLATE 30 MG/1
30 CAPSULE ORAL EVERY MORNING
Qty: 30 CAPSULE | Refills: 0 | Status: SHIPPED | OUTPATIENT
Start: 2025-01-27 | End: 2025-02-26

## 2025-01-27 NOTE — TELEPHONE ENCOUNTER
Last appointment this department: 12/11/2024  Next appointment this department: Visit date not found

## 2025-02-02 PROBLEM — E03.9 ACQUIRED HYPOTHYROIDISM: Status: ACTIVE | Noted: 2025-02-02

## 2025-02-02 PROBLEM — C73 THYROID CANCER (HCC): Status: ACTIVE | Noted: 2025-02-02

## 2025-02-02 PROBLEM — E23.0 HYPOGONADOTROPIC HYPOGONADISM (HCC): Status: ACTIVE | Noted: 2025-02-02

## 2025-02-02 PROBLEM — E22.1 HYPERPROLACTINEMIA (HCC): Status: ACTIVE | Noted: 2025-02-02

## 2025-02-02 PROBLEM — Z98.890 STATUS POST THYROID SURGERY: Status: ACTIVE | Noted: 2025-02-02

## 2025-02-03 ENCOUNTER — OFFICE VISIT (OUTPATIENT)
Dept: ENDOCRINOLOGY | Age: 30
End: 2025-02-03
Payer: COMMERCIAL

## 2025-02-03 ENCOUNTER — TELEPHONE (OUTPATIENT)
Dept: ENDOCRINOLOGY | Age: 30
End: 2025-02-03

## 2025-02-03 ENCOUNTER — TELEPHONE (OUTPATIENT)
Dept: ENT CLINIC | Age: 30
End: 2025-02-03

## 2025-02-03 VITALS
HEART RATE: 95 BPM | WEIGHT: 260 LBS | DIASTOLIC BLOOD PRESSURE: 85 MMHG | SYSTOLIC BLOOD PRESSURE: 132 MMHG | HEIGHT: 72 IN | BODY MASS INDEX: 35.21 KG/M2 | RESPIRATION RATE: 14 BRPM | OXYGEN SATURATION: 96 % | TEMPERATURE: 98 F

## 2025-02-03 DIAGNOSIS — C73 THYROID CANCER (HCC): Primary | ICD-10-CM

## 2025-02-03 DIAGNOSIS — E23.0 HYPOGONADOTROPIC HYPOGONADISM (HCC): ICD-10-CM

## 2025-02-03 DIAGNOSIS — E22.1 HYPERPROLACTINEMIA (HCC): ICD-10-CM

## 2025-02-03 DIAGNOSIS — Z98.890 STATUS POST THYROID SURGERY: ICD-10-CM

## 2025-02-03 DIAGNOSIS — E03.9 ACQUIRED HYPOTHYROIDISM: ICD-10-CM

## 2025-02-03 DIAGNOSIS — E06.3 HASHIMOTO'S THYROIDITIS: ICD-10-CM

## 2025-02-03 PROBLEM — E88.819 INSULIN RESISTANCE: Status: ACTIVE | Noted: 2025-02-03

## 2025-02-03 PROCEDURE — 99205 OFFICE O/P NEW HI 60 MIN: CPT | Performed by: INTERNAL MEDICINE

## 2025-02-03 PROCEDURE — G8427 DOCREV CUR MEDS BY ELIG CLIN: HCPCS | Performed by: INTERNAL MEDICINE

## 2025-02-03 PROCEDURE — 1036F TOBACCO NON-USER: CPT | Performed by: INTERNAL MEDICINE

## 2025-02-03 PROCEDURE — G8417 CALC BMI ABV UP PARAM F/U: HCPCS | Performed by: INTERNAL MEDICINE

## 2025-02-03 RX ORDER — LEVOTHYROXINE SODIUM 100 MCG
100 TABLET ORAL DAILY
Qty: 90 TABLET | Refills: 1 | Status: CANCELLED | OUTPATIENT
Start: 2025-02-03

## 2025-02-03 RX ORDER — LEVOTHYROXINE SODIUM 137 MCG
137 TABLET ORAL
Qty: 30 TABLET | Refills: 3 | Status: SHIPPED | OUTPATIENT
Start: 2025-02-03

## 2025-02-03 NOTE — TELEPHONE ENCOUNTER
Patient was calling in wanted to know if he needs to follow up with Dr Soler tomorrow?  He said that his voice is back now unlike it was after surgery.    He is going to be seen at his new endocrinologist's office today in [place].  Patient stated they are within [facility] so everything will be in the system.    Please advise

## 2025-02-03 NOTE — PROGRESS NOTES
get his wife pregnant.  Therefore testosterone replacement therapy is not indicated.  Obtain workup.  8/1/2024 PSA 0.43  Used to work third shift, but not recently.  - Prolactin; Future  - ACTH; Future  - Cortisol AM, Total; Future  - Follicle Stimulating Hormone; Future  - Luteinizing Hormone; Future  - Estradiol; Future  - Insulin-Like Growth Factor; Future  - Testosterone, free, total; Future  - CBC; Future  - Ferritin; Future  - Iron and TIBC; Future  - Hemoglobin A1C; Future  - Insulin, total; Future  - Lipid, Fasting; Future  - MRI BRAIN W WO CONTRAST; Future    5. Hyperprolactinemia (HCC)  Obtain pituitary MRI.  - Prolactin; Future  - ACTH; Future  - Cortisol AM, Total; Future  - Follicle Stimulating Hormone; Future  - Luteinizing Hormone; Future  - Estradiol; Future  - Insulin-Like Growth Factor; Future  - Testosterone, free, total; Future  - CBC; Future  - Ferritin; Future  - Iron and TIBC; Future  - MRI BRAIN W WO CONTRAST; Future    6. Hashimoto's thyroiditis  - Anti-Thyroglobulin Antibody; Future  - Thyroid Peroxidase Antibody; Future  - T4, Free; Future  - T3, Free; Future  - TSH; Future    Reviewed and/or ordered clinical lab results Yes  Reviewed and/or ordered radiology tests Yes   Reviewed and/or ordered other diagnostic tests No  Discussed test results with performing physician No  Independently reviewed image, tracing, or specimen No  Made a decision to obtain old records No  Reviewed and summarized old records Yes  TSH 7.57-13 0.36-7.32  TPO   Thyroglobulin   LH 6.0-7.1  FSH 3.3-3.5  Testosterone [id number]  Estradiol 23-22  Prolactin 15.9-21.8  8/1/2024 PSA 0.43  Insulin 13.8.  Hemoglobin A1c 5.3.  Obtained history from other than patient No    Zana ELISE Bailey was counseled regarding symptoms of thyroid cancer, hypothyroidism, Hashimoto's thyroiditis, hypogonadism diagnosis, course and complications of disease if inadequately treated, side effects of medications, diagnosis, treatment

## 2025-02-03 NOTE — TELEPHONE ENCOUNTER
Up to patient however per chart review appears Dr. Soler was planning a post operative laryngoscopy (scope of throat) tomorrow to assess vocal cord function.  Otherwise if he wishes to defer; recommend management of thyroid hormone with endocrinology and patient will need serial US of thyroid to monitor remaining thyroid lobe; most likely within the next 6 months.

## 2025-02-03 NOTE — TELEPHONE ENCOUNTER
Called patient to inform him on what Christine said. Patient states that he would like to cancel his appointment with Ryder tomorrow and would like to see us as needed. Also states that he would get in touch with an endocrinologist. Patient verbalized understanding and thanked me.

## 2025-02-04 ENCOUNTER — PATIENT MESSAGE (OUTPATIENT)
Dept: ENDOCRINOLOGY | Age: 30
End: 2025-02-04

## 2025-02-04 NOTE — TELEPHONE ENCOUNTER
LVM to return call  Please advise patient that I was not able to locate his PSA.  He had result from 2024 in his records.  Please provide us with a written copy or let us know where he had it done so we can obtain copy of his PSA result.

## 2025-02-04 NOTE — TELEPHONE ENCOUNTER
Please advise patient that I was not able to locate his PSA.  He had result from 2024 in his records.  Please provide us with a written copy or let us know where he had it done so we can obtain copy of his PSA result.

## 2025-02-11 ENCOUNTER — HOSPITAL ENCOUNTER (OUTPATIENT)
Age: 30
Discharge: HOME | End: 2025-02-11
Payer: COMMERCIAL

## 2025-02-11 ENCOUNTER — TELEPHONE (OUTPATIENT)
Dept: ENT CLINIC | Age: 30
End: 2025-02-11

## 2025-02-11 ENCOUNTER — PATIENT MESSAGE (OUTPATIENT)
Dept: ENDOCRINOLOGY | Age: 30
End: 2025-02-11

## 2025-02-11 DIAGNOSIS — E23.0 HYPOGONADOTROPIC HYPOGONADISM: ICD-10-CM

## 2025-02-11 DIAGNOSIS — C73 THYROID CANCER (HCC): ICD-10-CM

## 2025-02-11 DIAGNOSIS — E03.9 ACQUIRED HYPOTHYROIDISM: Primary | ICD-10-CM

## 2025-02-11 DIAGNOSIS — E03.9 ACQUIRED HYPOTHYROIDISM: ICD-10-CM

## 2025-02-11 DIAGNOSIS — E06.3 HASHIMOTO'S THYROIDITIS: ICD-10-CM

## 2025-02-11 DIAGNOSIS — E22.1 HYPERPROLACTINEMIA: ICD-10-CM

## 2025-02-11 LAB
CHOLESTEROL, FASTING: 175 MG/DL (ref 100–199)
CORTIS SERPL-MCNC: 8.91 UG/DL
CORTISOL COLLECTION INFO: NORMAL
DEPRECATED MEAN GLUCOSE BLD GHB EST-ACNC: 96 MG/DL (ref 70–126)
DEPRECATED RDW RBC AUTO: 39.8 FL (ref 35–45)
ERYTHROCYTE [DISTWIDTH] IN BLOOD BY AUTOMATED COUNT: 12.4 % (ref 11.5–14.5)
FERRITIN SERPL IA-MCNC: 181 NG/ML (ref 22–322)
HBA1C MFR BLD HPLC: 5.2 % (ref 4.4–6.4)
HCT VFR BLD AUTO: 44.7 % (ref 42–52)
HDLC SERPL-MCNC: 40 MG/DL
HGB BLD-MCNC: 14.9 GM/DL (ref 14–18)
IRON SERPL-MCNC: 84 UG/DL (ref 65–195)
LDLC SERPL CALC-MCNC: 115 MG/DL
MCH RBC QN AUTO: 29.3 PG (ref 26–33)
MCHC RBC AUTO-ENTMCNC: 33.3 GM/DL (ref 32.2–35.5)
MCV RBC AUTO: 88 FL (ref 80–94)
PLATELET # BLD AUTO: 237 THOU/MM3 (ref 130–400)
PMV BLD AUTO: 10.3 FL (ref 9.4–12.4)
PROLACTIN SERPL-MCNC: 20.8 NG/ML
RBC # BLD AUTO: 5.08 MILL/MM3 (ref 4.7–6.1)
TIBC SERPL-MCNC: 309 UG/DL (ref 171–450)
TRIGLYCERIDE, FASTING: 99 MG/DL (ref 0–199)
WBC # BLD AUTO: 12.6 THOU/MM3 (ref 4.8–10.8)

## 2025-02-11 PROCEDURE — 83036 HEMOGLOBIN GLYCOSYLATED A1C: CPT

## 2025-02-11 PROCEDURE — 83540 ASSAY OF IRON: CPT

## 2025-02-11 PROCEDURE — 84403 ASSAY OF TOTAL TESTOSTERONE: CPT

## 2025-02-11 PROCEDURE — 82024 ASSAY OF ACTH: CPT

## 2025-02-11 PROCEDURE — 83525 ASSAY OF INSULIN: CPT

## 2025-02-11 PROCEDURE — 86376 MICROSOMAL ANTIBODY EACH: CPT

## 2025-02-11 PROCEDURE — 84402 ASSAY OF FREE TESTOSTERONE: CPT

## 2025-02-11 PROCEDURE — 84146 ASSAY OF PROLACTIN: CPT

## 2025-02-11 PROCEDURE — 83001 ASSAY OF GONADOTROPIN (FSH): CPT

## 2025-02-11 PROCEDURE — 84432 ASSAY OF THYROGLOBULIN: CPT

## 2025-02-11 PROCEDURE — 83550 IRON BINDING TEST: CPT

## 2025-02-11 PROCEDURE — 84305 ASSAY OF SOMATOMEDIN: CPT

## 2025-02-11 PROCEDURE — 82728 ASSAY OF FERRITIN: CPT

## 2025-02-11 PROCEDURE — 80061 LIPID PANEL: CPT

## 2025-02-11 PROCEDURE — 84270 ASSAY OF SEX HORMONE GLOBUL: CPT

## 2025-02-11 PROCEDURE — 83002 ASSAY OF GONADOTROPIN (LH): CPT

## 2025-02-11 PROCEDURE — 36415 COLL VENOUS BLD VENIPUNCTURE: CPT

## 2025-02-11 PROCEDURE — 82670 ASSAY OF TOTAL ESTRADIOL: CPT

## 2025-02-11 PROCEDURE — 85027 COMPLETE CBC AUTOMATED: CPT

## 2025-02-11 PROCEDURE — 82533 TOTAL CORTISOL: CPT

## 2025-02-11 PROCEDURE — 86800 THYROGLOBULIN ANTIBODY: CPT

## 2025-02-11 RX ORDER — LEVOTHYROXINE SODIUM 150 MCG
150 TABLET ORAL
Qty: 30 TABLET | Refills: 3 | Status: SHIPPED | OUTPATIENT
Start: 2025-02-11

## 2025-02-11 NOTE — TELEPHONE ENCOUNTER
Please advise patient that in general require patient to be present in person for our appointments.  We can do exception this time, but plan to be coming in person.

## 2025-02-11 NOTE — TELEPHONE ENCOUNTER
Patient called and left message on  voicemail. He stated he had to cancel an appointment with Dr Soler on 02/04/25 and needs to get another appointment.     I attempted to call patient back. Got his voicemail, left message to call the office back.  I was going to schedule patient on 2/25/25 at 4:45 with Dr Soler.

## 2025-02-11 NOTE — TELEPHONE ENCOUNTER
Please advise if OK  Also is there any way I can change my appt. On March 3rd to a Virtual visit? [place] Thank you[place]

## 2025-02-12 LAB
ACTH PLAS-MCNC: 22.9 PG/ML (ref 7.2–63.3)
ESTRADIOL LEVEL: 23.1 PG/ML (ref 27–52)
FOLLICLE STIMULATING HORMONE: 3.9 MIU/ML (ref 1.5–12.4)
INSULIN SERPL-ACNC: 10.1 MU/L
LUTEINIZING HORMONE: 5.6 MIU/ML (ref 1.7–8.6)
SHBG SERPL-SCNC: 10 NMOL/L (ref 10–60)
TESTOST FREE MFR SERPL: 76.6 PG/ML (ref 47–244)
TESTOST SERPL-MCNC: 240 NG/DL (ref 249–836)
THYROGLOBULIN ANTIBODY: 186 IU/ML (ref 0–40)
THYROPEROXIDASE AB SERPL IA-ACNC: 121 IU/ML (ref 0–25)

## 2025-02-13 LAB
IGF-I SERPL-MCNC: 197 NG/ML (ref 82–244)
IGF-I Z-SCORE SERPL: 0.8

## 2025-02-18 LAB — THYROGLOB SERPL-MCNC: 0.9 NG/ML (ref 1.3–31.8)

## 2025-02-19 ENCOUNTER — TELEPHONE (OUTPATIENT)
Dept: FAMILY MEDICINE CLINIC | Age: 30
End: 2025-02-19

## 2025-02-19 NOTE — TELEPHONE ENCOUNTER
Pt tried to send mychart to schedule an appt for possible URI I gave him [facility] number to see if they could see him

## 2025-02-21 ENCOUNTER — HOSPITAL ENCOUNTER (OUTPATIENT)
Dept: MRI IMAGING | Age: 30
Discharge: HOME | End: 2025-02-21
Payer: COMMERCIAL

## 2025-02-21 DIAGNOSIS — E23.0 HYPOGONADOTROPIC HYPOGONADISM: ICD-10-CM

## 2025-02-21 DIAGNOSIS — E22.1 HYPERPROLACTINEMIA: ICD-10-CM

## 2025-02-21 PROCEDURE — 70553 MRI BRAIN STEM W/O & W/DYE: CPT

## 2025-02-21 PROCEDURE — A9579 GAD-BASE MR CONTRAST NOS,1ML: HCPCS | Performed by: INTERNAL MEDICINE

## 2025-02-21 PROCEDURE — 6360000004 HC RX CONTRAST MEDICATION: Performed by: INTERNAL MEDICINE

## 2025-02-21 RX ADMIN — GADOTERIDOL 20 ML: 279.3 INJECTION, SOLUTION INTRAVENOUS at 11:46

## 2025-03-01 ENCOUNTER — HOSPITAL ENCOUNTER (OUTPATIENT)
Age: 30
Discharge: HOME | End: 2025-03-01
Payer: COMMERCIAL

## 2025-03-01 DIAGNOSIS — Z98.890 STATUS POST THYROID SURGERY: ICD-10-CM

## 2025-03-01 DIAGNOSIS — E06.3 HASHIMOTO'S THYROIDITIS: ICD-10-CM

## 2025-03-01 DIAGNOSIS — E03.9 ACQUIRED HYPOTHYROIDISM: ICD-10-CM

## 2025-03-01 PROCEDURE — 36415 COLL VENOUS BLD VENIPUNCTURE: CPT

## 2025-03-01 PROCEDURE — 84443 ASSAY THYROID STIM HORMONE: CPT

## 2025-03-01 PROCEDURE — 84439 ASSAY OF FREE THYROXINE: CPT

## 2025-03-01 PROCEDURE — 84481 FREE ASSAY (FT-3): CPT

## 2025-03-01 PROCEDURE — 80053 COMPREHEN METABOLIC PANEL: CPT

## 2025-03-02 LAB
ALBUMIN SERPL BCG-MCNC: 4.4 G/DL (ref 3.4–4.9)
ALP SERPL-CCNC: 48 U/L (ref 40–129)
ALT SERPL W/O P-5'-P-CCNC: 33 U/L (ref 10–50)
ANION GAP SERPL CALC-SCNC: 11 MEQ/L (ref 8–16)
AST SERPL-CCNC: 26 U/L (ref 10–50)
BILIRUB SERPL-MCNC: 0.4 MG/DL (ref 0.3–1.2)
BUN SERPL-MCNC: 13 MG/DL (ref 8–23)
CALCIUM SERPL-MCNC: 9.2 MG/DL (ref 8.6–10)
CHLORIDE SERPL-SCNC: 105 MEQ/L (ref 98–111)
CO2 SERPL-SCNC: 24 MEQ/L (ref 22–29)
CREAT SERPL-MCNC: 1 MG/DL (ref 0.7–1.2)
GFR SERPL CREATININE-BSD FRML MDRD: > 90 ML/MIN/1.73M2
GLUCOSE SERPL-MCNC: 105 MG/DL (ref 74–109)
POTASSIUM SERPL-SCNC: 3.9 MEQ/L (ref 3.5–5.2)
PROT SERPL-MCNC: 6.9 G/DL (ref 6.4–8.3)
SODIUM SERPL-SCNC: 140 MEQ/L (ref 135–145)
T4 FREE SERPL-MCNC: 1.8 NG/DL (ref 0.92–1.68)
TSH SERPL DL<=0.05 MIU/L-ACNC: 2.49 UIU/ML (ref 0.27–4.2)

## 2025-03-03 ENCOUNTER — TELEMEDICINE (OUTPATIENT)
Dept: ENDOCRINOLOGY | Age: 30
End: 2025-03-03
Payer: COMMERCIAL

## 2025-03-03 DIAGNOSIS — E23.0 HYPOGONADOTROPIC HYPOGONADISM: ICD-10-CM

## 2025-03-03 DIAGNOSIS — C73 THYROID CANCER (HCC): Primary | ICD-10-CM

## 2025-03-03 DIAGNOSIS — E22.1 HYPERPROLACTINEMIA: ICD-10-CM

## 2025-03-03 DIAGNOSIS — E06.3 HASHIMOTO'S THYROIDITIS: ICD-10-CM

## 2025-03-03 DIAGNOSIS — Z98.890 STATUS POST THYROID SURGERY: ICD-10-CM

## 2025-03-03 DIAGNOSIS — E03.9 ACQUIRED HYPOTHYROIDISM: ICD-10-CM

## 2025-03-03 LAB — T3FREE SERPL-MCNC: 3.48 PG/ML (ref 2–4.4)

## 2025-03-03 PROCEDURE — G8427 DOCREV CUR MEDS BY ELIG CLIN: HCPCS | Performed by: INTERNAL MEDICINE

## 2025-03-03 PROCEDURE — 99214 OFFICE O/P EST MOD 30 MIN: CPT | Performed by: INTERNAL MEDICINE

## 2025-03-03 RX ORDER — CLOMIPHENE CITRATE 50 MG/1
25 TABLET ORAL
Qty: 30 TABLET | Refills: 1 | Status: SHIPPED | OUTPATIENT
Start: 2025-03-03

## 2025-03-03 NOTE — PROGRESS NOTES
provider was licensed to provide care.    Total time spent for this encounter:  38 minutes    --Carolina Riddle MD on 3/3/2025 at 11:45 PM    An electronic signature was used to authenticate this note.    Return in about 3 months (around 6/3/2025) for hypogonadism, thyroid problems.    Electronically signed by Carolina Riddle MD on 3/3/2025 at 11:45 PM

## 2025-03-04 ENCOUNTER — TELEMEDICINE (OUTPATIENT)
Dept: FAMILY MEDICINE CLINIC | Age: 30
End: 2025-03-04
Payer: COMMERCIAL

## 2025-03-04 DIAGNOSIS — F90.9 ATTENTION DEFICIT HYPERACTIVITY DISORDER (ADHD), UNSPECIFIED ADHD TYPE: ICD-10-CM

## 2025-03-04 DIAGNOSIS — J40 BRONCHITIS: Primary | ICD-10-CM

## 2025-03-04 PROCEDURE — 1036F TOBACCO NON-USER: CPT

## 2025-03-04 PROCEDURE — G8427 DOCREV CUR MEDS BY ELIG CLIN: HCPCS

## 2025-03-04 PROCEDURE — 99214 OFFICE O/P EST MOD 30 MIN: CPT

## 2025-03-04 PROCEDURE — G8417 CALC BMI ABV UP PARAM F/U: HCPCS

## 2025-03-04 RX ORDER — AZITHROMYCIN 250 MG/1
TABLET, FILM COATED ORAL
Qty: 6 TABLET | Refills: 0 | Status: SHIPPED | OUTPATIENT
Start: 2025-03-04 | End: 2025-03-14

## 2025-03-04 ASSESSMENT — ENCOUNTER SYMPTOMS
ABDOMINAL PAIN: 0
RHINORRHEA: 0
COUGH: 1
SORE THROAT: 0
DIARRHEA: 0
NAUSEA: 0
CONSTIPATION: 0
SHORTNESS OF BREATH: 0
WHEEZING: 0

## 2025-03-04 NOTE — PROGRESS NOTES
[place] [address]  Confirm you are appropriately licensed, registered, or certified to deliver care in the state where the patient is located as indicated above. If you are not or unsure, please re-schedule the visit: Yes, I confirm.        An electronic signature was used to authenticate this note.    Sriram Shabazz, HORACIO - CNP

## 2025-03-07 LAB — THYROGLOB SERPL-MCNC: 0.7 NG/ML (ref 1.3–31.8)

## 2025-03-15 ENCOUNTER — TELEPHONE (OUTPATIENT)
Dept: ENDOCRINOLOGY | Age: 30
End: 2025-03-15

## 2025-03-15 NOTE — TELEPHONE ENCOUNTER
Thyroglobulin came back even lower compared with previous one, no suspicion for thyroid cancer recurrence based on this.

## 2025-03-18 DIAGNOSIS — F90.9 ATTENTION DEFICIT HYPERACTIVITY DISORDER (ADHD), UNSPECIFIED ADHD TYPE: ICD-10-CM

## 2025-03-18 RX ORDER — LISDEXAMFETAMINE DIMESYLATE 30 MG/1
30 CAPSULE ORAL EVERY MORNING
Qty: 30 CAPSULE | Refills: 0 | Status: SHIPPED | OUTPATIENT
Start: 2025-03-18 | End: 2025-04-17

## 2025-03-18 NOTE — TELEPHONE ENCOUNTER
Last appointment this department: 3/4/2025  Next appointment this department: Visit date not found    Last fill was 1-27-25    Script states MICHAEL-does it have to be for BRAND name?

## 2025-04-18 ENCOUNTER — HOSPITAL ENCOUNTER (OUTPATIENT)
Age: 30
Discharge: HOME | End: 2025-04-18
Payer: COMMERCIAL

## 2025-04-18 DIAGNOSIS — E03.9 ACQUIRED HYPOTHYROIDISM: ICD-10-CM

## 2025-04-18 DIAGNOSIS — F90.9 ATTENTION DEFICIT HYPERACTIVITY DISORDER (ADHD), UNSPECIFIED ADHD TYPE: ICD-10-CM

## 2025-04-18 LAB
T4 FREE SERPL-MCNC: 1.8 NG/DL (ref 0.92–1.68)
TSH SERPL DL<=0.05 MIU/L-ACNC: 2.65 UIU/ML (ref 0.27–4.2)

## 2025-04-18 PROCEDURE — 84443 ASSAY THYROID STIM HORMONE: CPT

## 2025-04-18 PROCEDURE — 84481 FREE ASSAY (FT-3): CPT

## 2025-04-18 PROCEDURE — 84439 ASSAY OF FREE THYROXINE: CPT

## 2025-04-18 PROCEDURE — 36415 COLL VENOUS BLD VENIPUNCTURE: CPT

## 2025-04-18 RX ORDER — ALBUTEROL SULFATE 90 UG/1
2 INHALANT RESPIRATORY (INHALATION) EVERY 6 HOURS PRN
Qty: 8.5 G | Refills: 4 | Status: SHIPPED | OUTPATIENT
Start: 2025-04-18

## 2025-04-18 RX ORDER — LISDEXAMFETAMINE DIMESYLATE 30 MG/1
30 CAPSULE ORAL EVERY MORNING
Qty: 30 CAPSULE | Refills: 0 | Status: SHIPPED | OUTPATIENT
Start: 2025-04-18 | End: 2025-05-18

## 2025-04-18 NOTE — TELEPHONE ENCOUNTER
Last appointment this department: 3/4/2025  Next appointment this department: no upcoming appointment.

## 2025-04-18 NOTE — TELEPHONE ENCOUNTER
Last appointment this department: 3/4/2025  Next appointment this department: Visit date not found

## 2025-04-19 LAB — T3FREE SERPL-MCNC: 3.7 PG/ML (ref 2–4.4)

## 2025-05-23 DIAGNOSIS — F90.9 ATTENTION DEFICIT HYPERACTIVITY DISORDER (ADHD), UNSPECIFIED ADHD TYPE: ICD-10-CM

## 2025-05-27 RX ORDER — LISDEXAMFETAMINE DIMESYLATE 30 MG/1
30 CAPSULE ORAL EVERY MORNING
Qty: 30 CAPSULE | Refills: 0 | Status: SHIPPED | OUTPATIENT
Start: 2025-05-27 | End: 2025-06-26

## 2025-06-09 DIAGNOSIS — E03.9 ACQUIRED HYPOTHYROIDISM: ICD-10-CM

## 2025-06-10 RX ORDER — LEVOTHYROXINE SODIUM 150 MCG
150 TABLET ORAL
Qty: 30 TABLET | Refills: 1 | Status: SHIPPED | OUTPATIENT
Start: 2025-06-10

## 2025-06-16 ENCOUNTER — HOSPITAL ENCOUNTER (EMERGENCY)
Age: 30
Discharge: HOME | End: 2025-06-16
Payer: COMMERCIAL

## 2025-06-16 VITALS
TEMPERATURE: 98.2 F | HEART RATE: 80 BPM | SYSTOLIC BLOOD PRESSURE: 138 MMHG | RESPIRATION RATE: 18 BRPM | WEIGHT: 250 LBS | OXYGEN SATURATION: 96 % | DIASTOLIC BLOOD PRESSURE: 79 MMHG | BODY MASS INDEX: 33.9 KG/M2

## 2025-06-16 DIAGNOSIS — T24.231A PARTIAL THICKNESS BURN OF RIGHT LOWER LEG, INITIAL ENCOUNTER: Primary | ICD-10-CM

## 2025-06-16 PROCEDURE — 99213 OFFICE O/P EST LOW 20 MIN: CPT

## 2025-06-16 RX ORDER — PREDNISONE 20 MG/1
20 TABLET ORAL 2 TIMES DAILY
Qty: 10 TABLET | Refills: 0 | Status: SHIPPED | OUTPATIENT
Start: 2025-06-16 | End: 2025-06-21

## 2025-06-16 RX ORDER — CEPHALEXIN 500 MG/1
500 CAPSULE ORAL 4 TIMES DAILY
Qty: 28 CAPSULE | Refills: 0 | Status: SHIPPED | OUTPATIENT
Start: 2025-06-16 | End: 2025-06-23

## 2025-06-16 ASSESSMENT — PAIN - FUNCTIONAL ASSESSMENT: PAIN_FUNCTIONAL_ASSESSMENT: 0-10

## 2025-06-16 ASSESSMENT — PAIN SCALES - GENERAL: PAINLEVEL_OUTOF10: 2

## 2025-06-16 ASSESSMENT — PAIN DESCRIPTION - PAIN TYPE: TYPE: ACUTE PAIN

## 2025-06-16 ASSESSMENT — PAIN DESCRIPTION - LOCATION: LOCATION: LEG

## 2025-06-16 ASSESSMENT — PAIN DESCRIPTION - DESCRIPTORS: DESCRIPTORS: DULL;NAGGING
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
## 2025-06-16 NOTE — DISCHARGE INSTRUCTIONS
Continue to keep site clean  Medication as prescribed.  Elevate.  Tylenol and Ibuprofen as needed.  Follow up with PCP within the week.
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
## 2025-06-18 ENCOUNTER — TELEMEDICINE (OUTPATIENT)
Dept: FAMILY MEDICINE CLINIC | Age: 30
End: 2025-06-18
Payer: COMMERCIAL

## 2025-06-18 ENCOUNTER — TELEPHONE (OUTPATIENT)
Dept: FAMILY MEDICINE CLINIC | Age: 30
End: 2025-06-18

## 2025-06-18 DIAGNOSIS — F90.9 ATTENTION DEFICIT HYPERACTIVITY DISORDER (ADHD), UNSPECIFIED ADHD TYPE: Primary | ICD-10-CM

## 2025-06-18 PROCEDURE — 1036F TOBACCO NON-USER: CPT

## 2025-06-18 PROCEDURE — G8427 DOCREV CUR MEDS BY ELIG CLIN: HCPCS

## 2025-06-18 PROCEDURE — G8417 CALC BMI ABV UP PARAM F/U: HCPCS

## 2025-06-18 PROCEDURE — 99213 OFFICE O/P EST LOW 20 MIN: CPT

## 2025-06-18 RX ORDER — LISDEXAMFETAMINE DIMESYLATE 10 MG/1
CAPSULE ORAL
Qty: 30 CAPSULE | Refills: 0 | Status: SHIPPED | OUTPATIENT
Start: 2025-06-18 | End: 2025-07-18

## 2025-06-18 RX ORDER — LISDEXAMFETAMINE DIMESYLATE 20 MG/1
20 CAPSULE ORAL DAILY
Qty: 30 CAPSULE | Refills: 0 | Status: SHIPPED | OUTPATIENT
Start: 2025-06-18 | End: 2025-07-18

## 2025-06-18 ASSESSMENT — ENCOUNTER SYMPTOMS
SHORTNESS OF BREATH: 0
WHEEZING: 0
CONSTIPATION: 0
ABDOMINAL DISTENTION: 0
CHEST TIGHTNESS: 0
BACK PAIN: 0
COUGH: 0
SINUS PRESSURE: 0
STRIDOR: 0
VOMITING: 0
DIARRHEA: 0
NAUSEA: 0
ABDOMINAL PAIN: 0
SORE THROAT: 0
COLOR CHANGE: 0

## 2025-06-18 NOTE — TELEPHONE ENCOUNTER
Already taken care of with pharmacy. They were able to fill the 20 mg, the 10 mg they needed to order.

## 2025-06-18 NOTE — PROGRESS NOTES
Zana Bailey (:  1995) is a 29 y.o. male, Established patient, here for video visit evaluation of the following chief complaint(s):  Follow-up         Assessment & Plan  1. Attention Deficit Hyperactivity Disorder (ADHD).  - The patient's current medication regimen is not providing sufficient coverage throughout the day, particularly during his 12-hour shifts. He reports that his medication wears off between noon to 3 depending day, leading to decreased attention and increased fatigue.  -Discussed how his decreased appetite could be contributing to this effect and to try to eat regular balanced meals.   -No s/e besides slight decrease in appetite noted on 30 mg dosage. No insomnia, mood changes, or heart palpations/chest pain   - The potential benefits and risks of increasing the dosage of Vyvanse were discussed.  -Did not tolerate vyvanse 40 mg due to side effects.  -Does not like how fast acting stimulants make him feel so will avoid later afternoon adderall IR.   - A prescription for Vyvanse 20 mg in the morning and an additional 10 mg dose 4 to 6 hours later will be provided.  - The patient is advised to monitor his response to this adjusted regimen and provide feedback via [employer] within the first few weeks. If the current plan proves ineffective, consideration will be given to increasing the dosage to 20 mg twice daily after a month.  -PDMP reviewed and as expected.   -Does not wish to switch to methylphenidate as ritilan caused ED in past.  -Use of non stimulants did not help previously.         Discussed use, benefit, and side effects of prescribed medications.  Barriers to medication compliance addressed.  All patient questions answered.  Pt voiced understanding.     Results    1. Attention deficit hyperactivity disorder (ADHD), unspecified ADHD type  -     Lisdexamfetamine Dimesylate (VYVANSE) 20 MG CAPS; Take 1 capsule by mouth daily for 30 days. Max Daily Amount: 20 mg, Disp-30 capsule,

## 2025-06-20 ENCOUNTER — HOSPITAL ENCOUNTER (OUTPATIENT)
Age: 30
Discharge: HOME | End: 2025-06-20
Payer: COMMERCIAL

## 2025-06-20 DIAGNOSIS — E06.3 HASHIMOTO'S THYROIDITIS: ICD-10-CM

## 2025-06-20 DIAGNOSIS — C73 THYROID CANCER (HCC): ICD-10-CM

## 2025-06-20 DIAGNOSIS — E23.0 HYPOGONADOTROPIC HYPOGONADISM: ICD-10-CM

## 2025-06-20 DIAGNOSIS — E22.1 HYPERPROLACTINEMIA: ICD-10-CM

## 2025-06-20 DIAGNOSIS — E03.9 ACQUIRED HYPOTHYROIDISM: ICD-10-CM

## 2025-06-20 DIAGNOSIS — Z98.890 STATUS POST THYROID SURGERY: ICD-10-CM

## 2025-06-20 LAB
ALBUMIN SERPL BCG-MCNC: 4.3 G/DL (ref 3.4–4.9)
ALP SERPL-CCNC: 47 U/L (ref 40–129)
ALT SERPL W/O P-5'-P-CCNC: 39 U/L (ref 10–50)
ANION GAP SERPL CALC-SCNC: 9 MEQ/L (ref 8–16)
AST SERPL-CCNC: 21 U/L (ref 10–50)
BILIRUB SERPL-MCNC: 0.5 MG/DL (ref 0.3–1.2)
BUN SERPL-MCNC: 18 MG/DL (ref 8–23)
CALCIUM SERPL-MCNC: 9 MG/DL (ref 8.6–10)
CHLORIDE SERPL-SCNC: 105 MEQ/L (ref 98–111)
CO2 SERPL-SCNC: 26 MEQ/L (ref 22–29)
CREAT SERPL-MCNC: 0.9 MG/DL (ref 0.7–1.2)
DEPRECATED RDW RBC AUTO: 41.2 FL (ref 35–45)
ERYTHROCYTE [DISTWIDTH] IN BLOOD BY AUTOMATED COUNT: 12.6 % (ref 11.5–14.5)
GFR SERPL CREATININE-BSD FRML MDRD: > 90 ML/MIN/1.73M2
GLUCOSE SERPL-MCNC: 89 MG/DL (ref 74–109)
HCT VFR BLD AUTO: 47.5 % (ref 42–52)
HGB BLD-MCNC: 15.1 GM/DL (ref 14–18)
MCH RBC QN AUTO: 28.9 PG (ref 26–33)
MCHC RBC AUTO-ENTMCNC: 31.8 GM/DL (ref 32.2–35.5)
MCV RBC AUTO: 90.8 FL (ref 80–94)
PLATELET # BLD AUTO: 271 THOU/MM3 (ref 130–400)
PMV BLD AUTO: 10.2 FL (ref 9.4–12.4)
POTASSIUM SERPL-SCNC: 4.4 MEQ/L (ref 3.5–5.2)
PROLACTIN SERPL-MCNC: 16.6 NG/ML
PROT SERPL-MCNC: 6.8 G/DL (ref 6.4–8.3)
RBC # BLD AUTO: 5.23 MILL/MM3 (ref 4.7–6.1)
SODIUM SERPL-SCNC: 140 MEQ/L (ref 135–145)
T4 FREE SERPL-MCNC: 1.5 NG/DL (ref 0.92–1.68)
TSH SERPL DL<=0.05 MIU/L-ACNC: 4.49 UIU/ML (ref 0.27–4.2)
WBC # BLD AUTO: 8.6 THOU/MM3 (ref 4.8–10.8)

## 2025-06-20 PROCEDURE — 84439 ASSAY OF FREE THYROXINE: CPT

## 2025-06-20 PROCEDURE — 85027 COMPLETE CBC AUTOMATED: CPT

## 2025-06-20 PROCEDURE — 36415 COLL VENOUS BLD VENIPUNCTURE: CPT

## 2025-06-20 PROCEDURE — 84402 ASSAY OF FREE TESTOSTERONE: CPT

## 2025-06-20 PROCEDURE — 86800 THYROGLOBULIN ANTIBODY: CPT

## 2025-06-20 PROCEDURE — 84403 ASSAY OF TOTAL TESTOSTERONE: CPT

## 2025-06-20 PROCEDURE — 84146 ASSAY OF PROLACTIN: CPT

## 2025-06-20 PROCEDURE — 80053 COMPREHEN METABOLIC PANEL: CPT

## 2025-06-20 PROCEDURE — 84270 ASSAY OF SEX HORMONE GLOBUL: CPT

## 2025-06-20 PROCEDURE — 84443 ASSAY THYROID STIM HORMONE: CPT

## 2025-06-20 PROCEDURE — 84432 ASSAY OF THYROGLOBULIN: CPT

## 2025-06-20 PROCEDURE — 84481 FREE ASSAY (FT-3): CPT

## 2025-06-20 PROCEDURE — 82670 ASSAY OF TOTAL ESTRADIOL: CPT

## 2025-06-20 SDOH — ECONOMIC STABILITY: FOOD INSECURITY: WITHIN THE PAST 12 MONTHS, THE FOOD YOU BOUGHT JUST DIDN'T LAST AND YOU DIDN'T HAVE MONEY TO GET MORE.: PATIENT DECLINED

## 2025-06-20 SDOH — ECONOMIC STABILITY: FOOD INSECURITY: WITHIN THE PAST 12 MONTHS, YOU WORRIED THAT YOUR FOOD WOULD RUN OUT BEFORE YOU GOT MONEY TO BUY MORE.: PATIENT DECLINED

## 2025-06-20 ASSESSMENT — PATIENT HEALTH QUESTIONNAIRE - PHQ9
9. THOUGHTS THAT YOU WOULD BE BETTER OFF DEAD, OR OF HURTING YOURSELF: NOT AT ALL
SUM OF ALL RESPONSES TO PHQ QUESTIONS 1-9: 0
SUM OF ALL RESPONSES TO PHQ QUESTIONS 1-9: 0
3. TROUBLE FALLING OR STAYING ASLEEP: NOT AT ALL
7. TROUBLE CONCENTRATING ON THINGS, SUCH AS READING THE NEWSPAPER OR WATCHING TELEVISION: NOT AT ALL
4. FEELING TIRED OR HAVING LITTLE ENERGY: NOT AT ALL
2. FEELING DOWN, DEPRESSED OR HOPELESS: NOT AT ALL
SUM OF ALL RESPONSES TO PHQ QUESTIONS 1-9: 0
1. LITTLE INTEREST OR PLEASURE IN DOING THINGS: NOT AT ALL
10. IF YOU CHECKED OFF ANY PROBLEMS, HOW DIFFICULT HAVE THESE PROBLEMS MADE IT FOR YOU TO DO YOUR WORK, TAKE CARE OF THINGS AT HOME, OR GET ALONG WITH OTHER PEOPLE: NOT DIFFICULT AT ALL
6. FEELING BAD ABOUT YOURSELF - OR THAT YOU ARE A FAILURE OR HAVE LET YOURSELF OR YOUR FAMILY DOWN: NOT AT ALL
8. MOVING OR SPEAKING SO SLOWLY THAT OTHER PEOPLE COULD HAVE NOTICED. OR THE OPPOSITE, BEING SO FIGETY OR RESTLESS THAT YOU HAVE BEEN MOVING AROUND A LOT MORE THAN USUAL: NOT AT ALL
SUM OF ALL RESPONSES TO PHQ QUESTIONS 1-9: 0
5. POOR APPETITE OR OVEREATING: NOT AT ALL

## 2025-06-21 LAB
ESTRADIOL LEVEL: 20 PG/ML (ref 27–52)
SHBG SERPL-SCNC: 12 NMOL/L (ref 17–56)
T3FREE SERPL-MCNC: 3.19 PG/ML (ref 2–4.4)
TESTOST FREE MFR SERPL: 74 PG/ML (ref 47–244)
TESTOST SERPL-MCNC: 244 NG/DL (ref 249–836)

## 2025-06-22 ENCOUNTER — TELEPHONE (OUTPATIENT)
Dept: ENDOCRINOLOGY | Age: 30
End: 2025-06-22

## 2025-06-23 LAB — THYROGLOBULIN ANTIBODY: 199 IU/ML (ref 0–40)

## 2025-06-23 NOTE — TELEPHONE ENCOUNTER
1st attempt to contact Richa Bailey.  He needs follow up due to abnormal lab results per Dr. Riddle.

## 2025-06-23 NOTE — TELEPHONE ENCOUNTER
Pt called back, gave pt verbatim message.     There is no sooner appt.     Pt did send a mychart to discuss increasing thyroid med     Please advise   CB# [phone]

## 2025-06-25 LAB — THYROGLOB SERPL-MCNC: < 0.5 NG/ML (ref 1.3–31.8)

## 2025-07-15 ENCOUNTER — TELEPHONE (OUTPATIENT)
Dept: ENDOCRINOLOGY | Age: 30
End: 2025-07-15

## 2025-07-15 NOTE — TELEPHONE ENCOUNTER
Patient wife called and is requesting Dr. Riddle to write a letter stating that Mr. Bailey can not work rotating shifts at work. Patient takes synthroid due to having his thyroid removed and will not be able to take at the same time everyday if working a rotating schedule. The Shift would be Work 2 day shifts, off a day then work two night shifts .  can reached at [phone] with any questions.

## 2025-07-18 DIAGNOSIS — F90.9 ATTENTION DEFICIT HYPERACTIVITY DISORDER (ADHD), UNSPECIFIED ADHD TYPE: ICD-10-CM

## 2025-07-18 RX ORDER — LISDEXAMFETAMINE DIMESYLATE 20 MG/1
20 CAPSULE ORAL DAILY
Qty: 30 CAPSULE | Refills: 0 | Status: CANCELLED | OUTPATIENT
Start: 2025-07-18 | End: 2025-08-17

## 2025-07-18 NOTE — TELEPHONE ENCOUNTER
Last appointment this department: 6/18/2025  Next appointment this department: Visit date not found

## 2025-07-22 RX ORDER — LISDEXAMFETAMINE DIMESYLATE 10 MG/1
CAPSULE ORAL
Qty: 30 CAPSULE | Refills: 0 | Status: SHIPPED | OUTPATIENT
Start: 2025-07-22 | End: 2025-08-17

## 2025-07-22 RX ORDER — LISDEXAMFETAMINE DIMESYLATE 30 MG/1
30 CAPSULE ORAL DAILY
Qty: 30 CAPSULE | Refills: 0 | Status: SHIPPED | OUTPATIENT
Start: 2025-07-22 | End: 2025-08-21

## 2025-08-01 ENCOUNTER — HOSPITAL ENCOUNTER (OUTPATIENT)
Age: 30
Discharge: HOME | End: 2025-08-01
Payer: COMMERCIAL

## 2025-08-01 DIAGNOSIS — E03.9 ACQUIRED HYPOTHYROIDISM: ICD-10-CM

## 2025-08-01 LAB
T4 FREE SERPL-MCNC: 1.6 NG/DL (ref 0.92–1.68)
TSH SERPL DL<=0.05 MIU/L-ACNC: 1.4 UIU/ML (ref 0.27–4.2)

## 2025-08-01 PROCEDURE — 84481 FREE ASSAY (FT-3): CPT

## 2025-08-01 PROCEDURE — 84439 ASSAY OF FREE THYROXINE: CPT

## 2025-08-01 PROCEDURE — 36415 COLL VENOUS BLD VENIPUNCTURE: CPT

## 2025-08-01 PROCEDURE — 84443 ASSAY THYROID STIM HORMONE: CPT

## 2025-08-02 LAB — T3FREE SERPL-MCNC: 3.18 PG/ML (ref 2–4.4)

## 2025-08-12 ENCOUNTER — OFFICE VISIT (OUTPATIENT)
Dept: ENDOCRINOLOGY | Age: 30
End: 2025-08-12

## 2025-08-12 VITALS
BODY MASS INDEX: 33.32 KG/M2 | SYSTOLIC BLOOD PRESSURE: 134 MMHG | TEMPERATURE: 98 F | HEART RATE: 96 BPM | DIASTOLIC BLOOD PRESSURE: 88 MMHG | HEIGHT: 72 IN | WEIGHT: 246 LBS

## 2025-08-12 DIAGNOSIS — E06.3 HASHIMOTO'S THYROIDITIS: ICD-10-CM

## 2025-08-12 DIAGNOSIS — E03.9 ACQUIRED HYPOTHYROIDISM: ICD-10-CM

## 2025-08-12 DIAGNOSIS — C73 THYROID CANCER (HCC): Primary | ICD-10-CM

## 2025-08-12 DIAGNOSIS — E23.0 HYPOGONADOTROPIC HYPOGONADISM: ICD-10-CM

## 2025-08-12 DIAGNOSIS — E22.1 HYPERPROLACTINEMIA: ICD-10-CM

## 2025-08-12 DIAGNOSIS — Z98.890 STATUS POST THYROID SURGERY: ICD-10-CM

## 2025-08-12 RX ORDER — LEVOTHYROXINE SODIUM 200 MCG
200 TABLET ORAL
Qty: 90 TABLET | Refills: 1 | Status: SHIPPED | OUTPATIENT
Start: 2025-08-12

## 2025-08-22 DIAGNOSIS — F90.9 ATTENTION DEFICIT HYPERACTIVITY DISORDER (ADHD), UNSPECIFIED ADHD TYPE: ICD-10-CM

## 2025-08-22 RX ORDER — LISDEXAMFETAMINE DIMESYLATE 30 MG/1
30 CAPSULE ORAL DAILY
Qty: 30 CAPSULE | Refills: 0 | Status: SHIPPED | OUTPATIENT
Start: 2025-08-22 | End: 2025-09-21

## 2025-08-22 RX ORDER — LISDEXAMFETAMINE DIMESYLATE 10 MG/1
CAPSULE ORAL
Qty: 30 CAPSULE | Refills: 0 | Status: SHIPPED | OUTPATIENT
Start: 2025-08-22 | End: 2025-09-17

## (undated) DEVICE — PACK-MAJOR

## (undated) DEVICE — SUTURE SILK 2-0 CP-1 30IN N ABSRB BRAID BLK 443H

## (undated) DEVICE — DISSECTOR ENDOSCP L21CM TIP CURVATURE 40DEG FN CRV JAW VES

## (undated) DEVICE — BLADE 1884080EM TRICUT 4MMX13CM M4 ROHS: Brand: FUSION®

## (undated) DEVICE — TUBING 1895522 5PK STRAIGHTSHOT TO XPS: Brand: STRAIGHTSHOT®

## (undated) DEVICE — SINU FOAM: Brand: SINU-FOAM

## (undated) DEVICE — PATIENT TRACKER 9734887XOM NON-INVASIVE

## (undated) DEVICE — 3M™ STERI-DRAPE™ INSTRUMENT POUCH 1018: Brand: STERI-DRAPE™

## (undated) DEVICE — LIQUIBAND RAPID ADHESIVE 36/CS 0.8ML: Brand: MEDLINE

## (undated) DEVICE — GOWN,SIRUS,NONRNF,SETINSLV,XL,20/CS: Brand: MEDLINE

## (undated) DEVICE — PROBE 8225101 5PK STD PRASS FL TIP ROHS

## (undated) DEVICE — GAUZE,SPONGE,8"X4",12PLY,XRAY,STRL,LF: Brand: MEDLINE

## (undated) DEVICE — DRAPE,EENT,SPLIT,STERILE: Brand: MEDLINE

## (undated) DEVICE — HYPODERMIC SAFETY NEEDLE: Brand: MAGELLAN

## (undated) DEVICE — AGENT HEMOSTATIC SURGIFLOW MATRIX KIT W/THROMBIN

## (undated) DEVICE — CORD,CAUTERY,BIPOLAR,STERILE: Brand: MEDLINE

## (undated) DEVICE — GLOVE ORANGE PI 7 1/2   MSG9075

## (undated) DEVICE — ENTACT SEPTAL STAPLER 3 PACK: Brand: ENT SINUS

## (undated) DEVICE — NEEDLE HYPO 27GA L1.25IN GRY POLYPR HUB S STL REG BVL STR

## (undated) DEVICE — SUTURE PLN GUT SZ 4-0 L18IN ABSRB YELLOWISH TAN L13MM SC-1 1828H

## (undated) DEVICE — EMG TUBE 8229707 NIM TRIVANTAGE 7.0MM ID: Brand: NIM TRIVANTAGE™

## (undated) DEVICE — SUTURE NONABSORBABLE 3-0 12X18 IN PRE-CUT VICRYL VIO SUTUPAK VCP104G

## (undated) DEVICE — APPLICATOR MEDICATED 10.5 CC SOLUTION CLR STRL CHLORAPREP

## (undated) DEVICE — INSTRUMENT TRACKER 9733533XOM ENT 1PK

## (undated) DEVICE — APPLIER CLP L9.375IN APER 2.1MM CLS L3.8MM 20 SM TI CLP

## (undated) DEVICE — EVACUATOR SURG 100CC SIL BLB SUCT RESVR FOR CLS WND DRNGE

## (undated) DEVICE — SUTURE VICRYL SZ 5-0 L18IN ABSRB UD L13MM P-3 3/8 CIR PRIM J493G

## (undated) DEVICE — ENT PACK: Brand: MEDLINE INDUSTRIES, INC.

## (undated) DEVICE — Device

## (undated) DEVICE — SILICONE DUAL LUMEN STOMACH TUBE,ANTI-REFLUX VALVE AND RADIOPAQUE LINE: Brand: SALEM SUMP

## (undated) DEVICE — TAPE SURG W4INXL11YD 2IN PERF LINERLESS NONWOVEN MEDFIX EZ

## (undated) DEVICE — DRAIN,WOUND,15FR,3/16,FULL-FLUTED: Brand: MEDLINE

## (undated) DEVICE — BLADE ES ELASTOMERIC COAT INSUL DURABLE BEND UPTO 90DEG

## (undated) DEVICE — SPONGE,PEANUT,XRAY,ST,SM,3/8",5/CARD: Brand: MEDLINE INDUSTRIES, INC.

## (undated) DEVICE — LUKI TUBE SPECIMEN COLLECTION DEVICE,20 ML: Brand: ARGYLE

## (undated) DEVICE — BASIC SINGLE BASIN BTC-LF: Brand: MEDLINE INDUSTRIES, INC.

## (undated) DEVICE — TURBINATOR WAND: Brand: COBLATION

## (undated) DEVICE — DRAPE,TOP,102X53,STERILE: Brand: MEDLINE

## (undated) DEVICE — PENCIL SMK EVAC ALL IN 1 DSGN ENH VISIBILITY IMPROVED AIR

## (undated) DEVICE — BREAST HERNIA: Brand: MEDLINE INDUSTRIES, INC.

## (undated) DEVICE — SUTURE VICRYL + SZ 3-0 L27IN ABSRB UD L26MM SH 1/2 CIR VCP416H

## (undated) DEVICE — DRAPE,INSTRUMENT,MAGNETIC,10X16: Brand: MEDLINE